# Patient Record
Sex: FEMALE | Race: WHITE | NOT HISPANIC OR LATINO | Employment: OTHER | URBAN - NONMETROPOLITAN AREA
[De-identification: names, ages, dates, MRNs, and addresses within clinical notes are randomized per-mention and may not be internally consistent; named-entity substitution may affect disease eponyms.]

---

## 2023-04-20 PROBLEM — M54.50 CHRONIC LOWER BACK PAIN: Status: ACTIVE | Noted: 2023-04-20

## 2023-04-20 PROBLEM — F51.04 CHRONIC INSOMNIA: Status: ACTIVE | Noted: 2023-04-20

## 2023-04-20 PROBLEM — L70.9 ACNE: Status: ACTIVE | Noted: 2023-04-20

## 2023-04-20 PROBLEM — G43.909 MIGRAINE, UNSPECIFIED, NOT INTRACTABLE, WITHOUT STATUS MIGRAINOSUS: Status: ACTIVE | Noted: 2023-04-20

## 2023-04-20 PROBLEM — H57.12 LEFT EYE PAIN: Status: ACTIVE | Noted: 2023-04-20

## 2023-04-20 PROBLEM — R10.9 ABDOMINAL PAIN: Status: ACTIVE | Noted: 2023-04-20

## 2023-04-20 PROBLEM — E66.01 MORBID OBESITY (MULTI): Status: ACTIVE | Noted: 2023-04-20

## 2023-04-20 PROBLEM — R51.9 CHRONIC DAILY HEADACHE: Status: ACTIVE | Noted: 2023-04-20

## 2023-04-20 PROBLEM — M47.817 FACET DEGENERATION OF LUMBOSACRAL REGION: Status: ACTIVE | Noted: 2023-04-20

## 2023-04-20 PROBLEM — H10.32 ACUTE CONJUNCTIVITIS OF LEFT EYE: Status: ACTIVE | Noted: 2023-04-20

## 2023-04-20 PROBLEM — M51.27 OTHER INTERVERTEBRAL DISC DISPLACEMENT, LUMBOSACRAL REGION: Status: ACTIVE | Noted: 2023-04-20

## 2023-04-20 PROBLEM — E03.9 HYPOTHYROIDISM: Status: ACTIVE | Noted: 2023-04-20

## 2023-04-20 PROBLEM — F41.9 ANXIETY: Status: ACTIVE | Noted: 2023-04-20

## 2023-04-20 PROBLEM — H66.90 ACUTE OTITIS MEDIA: Status: ACTIVE | Noted: 2023-04-20

## 2023-04-20 PROBLEM — M16.11 PRIMARY LOCALIZED OSTEOARTHRITIS OF RIGHT HIP: Status: ACTIVE | Noted: 2023-04-20

## 2023-04-20 PROBLEM — J30.9 ALLERGIC RHINITIS: Status: ACTIVE | Noted: 2023-04-20

## 2023-04-20 PROBLEM — F41.1 GENERALIZED ANXIETY DISORDER WITH PANIC ATTACKS: Status: ACTIVE | Noted: 2023-04-20

## 2023-04-20 PROBLEM — L30.9 DERMATITIS: Status: ACTIVE | Noted: 2023-04-20

## 2023-04-20 PROBLEM — K21.9 GERD (GASTROESOPHAGEAL REFLUX DISEASE): Status: ACTIVE | Noted: 2023-04-20

## 2023-04-20 PROBLEM — M47.816 SPONDYLOSIS WITHOUT MYELOPATHY OR RADICULOPATHY, LUMBAR REGION: Status: ACTIVE | Noted: 2023-04-20

## 2023-04-20 PROBLEM — I10 BENIGN ESSENTIAL HYPERTENSION: Status: ACTIVE | Noted: 2023-04-20

## 2023-04-20 PROBLEM — N93.9 ABNORMAL UTERINE BLEEDING (AUB): Status: ACTIVE | Noted: 2023-04-20

## 2023-04-20 PROBLEM — H69.91 DYSFUNCTION OF RIGHT EUSTACHIAN TUBE: Status: ACTIVE | Noted: 2023-04-20

## 2023-04-20 PROBLEM — E78.00 HYPERCHOLESTEROLEMIA: Status: ACTIVE | Noted: 2023-04-20

## 2023-04-20 PROBLEM — M51.26 HERNIATED LUMBAR INTERVERTEBRAL DISC: Status: ACTIVE | Noted: 2023-04-20

## 2023-04-20 PROBLEM — R00.0 TACHYCARDIA: Status: ACTIVE | Noted: 2023-04-20

## 2023-04-20 PROBLEM — E28.2 POLYCYSTIC OVARY SYNDROME: Status: ACTIVE | Noted: 2023-04-20

## 2023-04-20 PROBLEM — F41.0 GENERALIZED ANXIETY DISORDER WITH PANIC ATTACKS: Status: ACTIVE | Noted: 2023-04-20

## 2023-04-20 PROBLEM — M47.812 CERVICAL SPONDYLOSIS WITHOUT MYELOPATHY: Status: ACTIVE | Noted: 2023-04-20

## 2023-04-20 PROBLEM — E55.9 VITAMIN D DEFICIENCY: Status: ACTIVE | Noted: 2023-04-20

## 2023-04-20 PROBLEM — G43.719 INTRACTABLE CHRONIC MIGRAINE WITHOUT AURA AND WITHOUT STATUS MIGRAINOSUS: Status: ACTIVE | Noted: 2023-04-20

## 2023-04-20 PROBLEM — R53.83 FATIGUE: Status: ACTIVE | Noted: 2023-04-20

## 2023-04-20 PROBLEM — H92.09 CHRONIC EAR PAIN: Status: ACTIVE | Noted: 2023-04-20

## 2023-04-20 PROBLEM — M54.16 RIGHT LUMBAR RADICULOPATHY: Status: ACTIVE | Noted: 2023-04-20

## 2023-04-20 PROBLEM — M54.9 BACK PAIN: Status: ACTIVE | Noted: 2023-04-20

## 2023-04-20 PROBLEM — G43.109 OCULAR MIGRAINE: Status: ACTIVE | Noted: 2023-04-20

## 2023-04-20 PROBLEM — S46.012A TRAUMATIC TEAR OF LEFT ROTATOR CUFF: Status: ACTIVE | Noted: 2023-04-20

## 2023-04-20 PROBLEM — L03.319 CELLULITIS OF TRUNK: Status: ACTIVE | Noted: 2023-04-20

## 2023-04-20 PROBLEM — G89.29 CHRONIC EAR PAIN: Status: ACTIVE | Noted: 2023-04-20

## 2023-04-20 PROBLEM — D72.829 LEUCOCYTOSIS: Status: ACTIVE | Noted: 2023-04-20

## 2023-04-20 PROBLEM — G89.29 CHRONIC LOWER BACK PAIN: Status: ACTIVE | Noted: 2023-04-20

## 2023-04-20 PROBLEM — N92.0 MENORRHAGIA: Status: ACTIVE | Noted: 2023-04-20

## 2023-04-20 RX ORDER — LEVOTHYROXINE SODIUM 100 UG/1
1 TABLET ORAL DAILY
COMMUNITY
Start: 2019-03-29 | End: 2023-04-24 | Stop reason: SDUPTHER

## 2023-04-20 RX ORDER — EPINEPHRINE 0.3 MG/.3ML
0.3 INJECTION SUBCUTANEOUS
COMMUNITY
Start: 2014-03-04 | End: 2023-07-11

## 2023-04-20 RX ORDER — TOPIRAMATE 100 MG/1
CAPSULE, EXTENDED RELEASE ORAL
COMMUNITY
Start: 2018-04-30 | End: 2023-04-24 | Stop reason: SDUPTHER

## 2023-04-20 RX ORDER — ONDANSETRON 4 MG/1
TABLET, FILM COATED ORAL
COMMUNITY
Start: 2017-11-30 | End: 2023-07-11

## 2023-04-20 RX ORDER — LANSOPRAZOLE 30 MG/1
CAPSULE, DELAYED RELEASE ORAL
COMMUNITY
Start: 2018-04-01 | End: 2023-04-24 | Stop reason: SDUPTHER

## 2023-04-20 RX ORDER — SPIRONOLACTONE 100 MG/1
4 TABLET, FILM COATED ORAL DAILY
COMMUNITY
Start: 2016-12-12 | End: 2024-02-06 | Stop reason: ALTCHOICE

## 2023-04-20 RX ORDER — TEMAZEPAM 30 MG/1
CAPSULE ORAL
COMMUNITY
Start: 2019-03-29 | End: 2023-07-11

## 2023-04-20 RX ORDER — CELECOXIB 200 MG/1
1 CAPSULE ORAL 2 TIMES DAILY
COMMUNITY
Start: 2019-11-08 | End: 2023-04-24

## 2023-04-20 RX ORDER — LEVONORGESTREL / ETHINYL ESTRADIOL AND ETHINYL ESTRADIOL 150-30(84)
KIT ORAL
COMMUNITY
Start: 2017-10-20 | End: 2023-04-24 | Stop reason: SDUPTHER

## 2023-04-20 RX ORDER — CYCLOBENZAPRINE HCL 10 MG
1 TABLET ORAL 2 TIMES DAILY PRN
COMMUNITY
Start: 2017-02-02 | End: 2023-07-11

## 2023-04-20 RX ORDER — ALPRAZOLAM 1 MG/1
1 TABLET ORAL 3 TIMES DAILY PRN
COMMUNITY
Start: 2014-03-31 | End: 2024-01-12 | Stop reason: SDUPTHER

## 2023-04-20 RX ORDER — NEBIVOLOL 10 MG/1
TABLET ORAL
COMMUNITY
Start: 2014-06-03 | End: 2023-05-11 | Stop reason: SDUPTHER

## 2023-04-20 RX ORDER — ERGOCALCIFEROL 1.25 MG/1
1 CAPSULE ORAL
COMMUNITY
Start: 2016-12-12 | End: 2023-04-24

## 2023-04-20 RX ORDER — NALOXONE HYDROCHLORIDE 4 MG/.1ML
SPRAY NASAL
COMMUNITY
Start: 2019-05-17 | End: 2023-07-11

## 2023-04-20 RX ORDER — TOBRAMYCIN AND DEXAMETHASONE 3; 1 MG/ML; MG/ML
SUSPENSION/ DROPS OPHTHALMIC
COMMUNITY
Start: 2021-06-06 | End: 2023-07-11

## 2023-04-20 RX ORDER — NEBIVOLOL 20 MG/1
20 TABLET ORAL 2 TIMES DAILY
COMMUNITY
Start: 2014-08-13 | End: 2023-05-10 | Stop reason: SDUPTHER

## 2023-04-24 ENCOUNTER — OFFICE VISIT (OUTPATIENT)
Dept: PRIMARY CARE | Facility: CLINIC | Age: 53
End: 2023-04-24
Payer: COMMERCIAL

## 2023-04-24 ENCOUNTER — APPOINTMENT (OUTPATIENT)
Dept: LAB | Facility: LAB | Age: 53
End: 2023-04-24
Payer: COMMERCIAL

## 2023-04-24 VITALS — SYSTOLIC BLOOD PRESSURE: 120 MMHG | OXYGEN SATURATION: 100 % | DIASTOLIC BLOOD PRESSURE: 70 MMHG | HEART RATE: 107 BPM

## 2023-04-24 DIAGNOSIS — F41.9 ANXIETY: ICD-10-CM

## 2023-04-24 DIAGNOSIS — E11.69 TYPE 2 DIABETES MELLITUS WITH OTHER SPECIFIED COMPLICATION, WITHOUT LONG-TERM CURRENT USE OF INSULIN (MULTI): ICD-10-CM

## 2023-04-24 DIAGNOSIS — L03.115 CELLULITIS OF RIGHT LOWER EXTREMITY: ICD-10-CM

## 2023-04-24 DIAGNOSIS — R53.1 WEAK: Primary | ICD-10-CM

## 2023-04-24 DIAGNOSIS — G43.809 OTHER MIGRAINE WITHOUT STATUS MIGRAINOSUS, NOT INTRACTABLE: ICD-10-CM

## 2023-04-24 DIAGNOSIS — K21.9 GASTROESOPHAGEAL REFLUX DISEASE WITHOUT ESOPHAGITIS: ICD-10-CM

## 2023-04-24 DIAGNOSIS — Z30.9 ENCOUNTER FOR CONTRACEPTIVE MANAGEMENT, UNSPECIFIED TYPE: ICD-10-CM

## 2023-04-24 DIAGNOSIS — E03.9 HYPOTHYROIDISM, UNSPECIFIED TYPE: ICD-10-CM

## 2023-04-24 DIAGNOSIS — R11.2 NAUSEA AND VOMITING, UNSPECIFIED VOMITING TYPE: ICD-10-CM

## 2023-04-24 LAB
ALANINE AMINOTRANSFERASE (SGPT) (U/L) IN SER/PLAS: 10 U/L (ref 7–45)
ALBUMIN (G/DL) IN SER/PLAS: 4 G/DL (ref 3.4–5)
ALKALINE PHOSPHATASE (U/L) IN SER/PLAS: 83 U/L (ref 33–110)
AMYLASE (U/L) IN SER/PLAS: 35 U/L (ref 29–103)
ANION GAP IN SER/PLAS: 18 MMOL/L (ref 10–20)
ASPARTATE AMINOTRANSFERASE (SGOT) (U/L) IN SER/PLAS: 14 U/L (ref 9–39)
BILIRUBIN TOTAL (MG/DL) IN SER/PLAS: 0.4 MG/DL (ref 0–1.2)
CALCIUM (MG/DL) IN SER/PLAS: 9.2 MG/DL (ref 8.6–10.3)
CARBON DIOXIDE, TOTAL (MMOL/L) IN SER/PLAS: 19 MMOL/L (ref 21–32)
CHLORIDE (MMOL/L) IN SER/PLAS: 94 MMOL/L (ref 98–107)
CREATININE (MG/DL) IN SER/PLAS: 1.08 MG/DL (ref 0.5–1.05)
GFR FEMALE: 62 ML/MIN/1.73M2
GLUCOSE (MG/DL) IN SER/PLAS: 622 MG/DL (ref 74–99)
LIPASE (U/L) IN SER/PLAS: 74 U/L (ref 9–82)
POTASSIUM (MMOL/L) IN SER/PLAS: 4.6 MMOL/L (ref 3.5–5.3)
PROTEIN TOTAL: 7.8 G/DL (ref 6.4–8.2)
SODIUM (MMOL/L) IN SER/PLAS: 126 MMOL/L (ref 136–145)
UREA NITROGEN (MG/DL) IN SER/PLAS: 15 MG/DL (ref 6–23)

## 2023-04-24 PROCEDURE — 80053 COMPREHEN METABOLIC PANEL: CPT

## 2023-04-24 PROCEDURE — 3078F DIAST BP <80 MM HG: CPT | Performed by: INTERNAL MEDICINE

## 2023-04-24 PROCEDURE — 99214 OFFICE O/P EST MOD 30 MIN: CPT | Performed by: INTERNAL MEDICINE

## 2023-04-24 PROCEDURE — 36415 COLL VENOUS BLD VENIPUNCTURE: CPT

## 2023-04-24 PROCEDURE — 1036F TOBACCO NON-USER: CPT | Performed by: INTERNAL MEDICINE

## 2023-04-24 PROCEDURE — 83690 ASSAY OF LIPASE: CPT

## 2023-04-24 PROCEDURE — 82150 ASSAY OF AMYLASE: CPT

## 2023-04-24 PROCEDURE — 3074F SYST BP LT 130 MM HG: CPT | Performed by: INTERNAL MEDICINE

## 2023-04-24 RX ORDER — LEVOTHYROXINE SODIUM 100 UG/1
100 TABLET ORAL DAILY
Qty: 30 TABLET | Refills: 0 | Status: SHIPPED | OUTPATIENT
Start: 2023-04-24 | End: 2023-04-24

## 2023-04-24 RX ORDER — TOPIRAMATE 50 MG/1
50 TABLET, FILM COATED ORAL 2 TIMES DAILY
COMMUNITY
End: 2023-04-24 | Stop reason: SDUPTHER

## 2023-04-24 RX ORDER — TOPIRAMATE 50 MG/1
TABLET, FILM COATED ORAL
Qty: 90 TABLET | Refills: 0 | Status: SHIPPED | OUTPATIENT
Start: 2023-04-24 | End: 2024-01-19 | Stop reason: WASHOUT

## 2023-04-24 RX ORDER — LANSOPRAZOLE 30 MG/1
30 CAPSULE, DELAYED RELEASE ORAL 2 TIMES DAILY
Qty: 180 CAPSULE | Refills: 0 | Status: SHIPPED | OUTPATIENT
Start: 2023-04-24 | End: 2023-08-08 | Stop reason: SDUPTHER

## 2023-04-24 RX ORDER — TOPIRAMATE 100 MG/1
100 CAPSULE, EXTENDED RELEASE ORAL 3 TIMES DAILY
Qty: 270 CAPSULE | Refills: 0 | Status: SHIPPED | OUTPATIENT
Start: 2023-04-24 | End: 2023-07-18

## 2023-04-24 RX ORDER — LEVOTHYROXINE SODIUM 100 UG/1
100 TABLET ORAL
Qty: 30 TABLET | Refills: 0 | Status: SHIPPED | OUTPATIENT
Start: 2023-04-24 | End: 2023-07-03

## 2023-04-24 RX ORDER — LEVONORGESTREL / ETHINYL ESTRADIOL AND ETHINYL ESTRADIOL 150-30(84)
1 KIT ORAL DAILY
Qty: 90 TABLET | Refills: 0 | Status: SHIPPED | OUTPATIENT
Start: 2023-04-24 | End: 2023-08-08 | Stop reason: SDUPTHER

## 2023-04-24 NOTE — PROGRESS NOTES
Subjective   Patient ID: Azucena Hagan is a 52 y.o. female who presents for Follow-up (During Easter patient was vomiting, 10 times, vomtiing mucus did not have food in system, patient was too weak to get up, sweating, muscle cramps too tired to do anything)  .  HPI  Patient here for sick visit.    Spouse (Van) present for H&P    Patient states that she had about of vomiting and weakness with muscle cramps occurred 2 weeks ago.  Since then she continues to be tired needing help to ambulate and continues to have the muscle cramps.  Refused to go to ER when sx started. Dangers of dehydration rev'd.    DM not on therapy due to weight loss.  Blood sugars not being checked.  Dangers including DKA, death reviewed.    Hypothyroid patient has noticed significant hair loss even prior to illness has not been taking her medication for at least a month.  Unclear if ran out or why this was the case.    Chronic heel infection healing  slowly podiatry following.    BRENDAN on therapy per psychiatry no side effects.    Oral contraceptive therapy/PCOS.  Patient continues on therapy will need to see GYN for follow-up once improved.    GERD stable  on rx no side effects    Migraines stable on rx no side effects    Diet discussed    Review of Systems   All other systems reviewed and are negative.      Objective   Physical Exam  Vitals reviewed.   Constitutional:       Appearance: Normal appearance. She is normal weight.      Comments: Weak  Need assistance to walk unsteady   HENT:      Head: Normocephalic and atraumatic.      Mouth/Throat:      Pharynx: No posterior oropharyngeal erythema.   Eyes:      General: No scleral icterus.     Conjunctiva/sclera: Conjunctivae normal.      Pupils: Pupils are equal, round, and reactive to light.   Cardiovascular:      Rate and Rhythm: Normal rate and regular rhythm.      Heart sounds: Normal heart sounds.   Pulmonary:      Effort: Pulmonary effort is normal. No respiratory distress.      Breath  sounds: No wheezing.   Abdominal:      General: Abdomen is flat. Bowel sounds are normal. There is no distension.      Palpations: Abdomen is soft. There is no mass.      Tenderness: There is no abdominal tenderness. There is no rebound.   Musculoskeletal:         General: Normal range of motion.      Cervical back: Normal range of motion and neck supple.   Skin:     General: Skin is warm and dry.   Neurological:      General: No focal deficit present.      Mental Status: She is alert and oriented to person, place, and time. Mental status is at baseline.   Psychiatric:         Mood and Affect: Mood normal.         Behavior: Behavior normal.         Thought Content: Thought content normal.         Judgment: Judgment normal.         Assessment/Plan   Problem List Items Addressed This Visit          Digestive    GERD (gastroesophageal reflux disease)    Relevant Medications    lansoprazole (Prevacid) 30 mg DR capsule       Endocrine/Metabolic    Hypothyroidism    Relevant Medications    Synthroid 100 mcg tablet       Other    Anxiety    Migraine, unspecified, not intractable, without status migrainosus    Relevant Medications    topiramate 100 mg capsule,extended release 24hr    topiramate (Topamax) 50 mg tablet     Other Visit Diagnoses       Weak    -  Primary    Relevant Orders    Comprehensive Metabolic Panel (Completed)    Nausea and vomiting, unspecified vomiting type        Relevant Orders    Comprehensive Metabolic Panel (Completed)    Lipase (Completed)    Amylase (Completed)    Type 2 diabetes mellitus with other specified complication, without long-term current use of insulin (CMS/[facility])        Cellulitis of right lower extremity        Encounter for contraceptive management, unspecified type        Relevant Medications    L norgest/e.estradioL-e.estrad (Seasonique) 0.15 mg-30 mcg (84)/10 mcg (7) tablets,dose pack,3 month tablet            Patient states that she had about of vomiting and weakness with muscle  cramps occurred 2 weeks ago.  Since then she continues to be tired needing help to ambulate and continues to have the muscle cramps.  Refused to go to ER when sx started. Dangers of dehydration rev'd.  Check labs now    DM not on therapy due to weight loss.  Blood sugars not being checked.  Dangers including DKA, death reviewed.    Hypothyroid patient has noticed significant hair loss even prior to illness has not been taking her medication for at least a month.  Unclear if ran out or why this was the case. Will resume Synthroid 100 mcg daily and recheck in 3 weeks    Chronic heel infection healing  slowly podiatry following.    BRENDAN on therapy per psychiatry no side effects.    Oral contraceptive therapy/PCOS.  Patient continues on therapy will need to see GYN for follow-up once improved.    GERD stable  on rx no side effects    Migraines stable on rx no side effects    Diet discussed    Follow up pending

## 2023-05-03 DIAGNOSIS — R53.1 WEAKNESS: ICD-10-CM

## 2023-05-03 DIAGNOSIS — R53.83 OTHER FATIGUE: Primary | ICD-10-CM

## 2023-05-10 DIAGNOSIS — U09.9 POST-COVID-19 SYNDROME MANIFESTING AS CHRONIC FATIGUE: ICD-10-CM

## 2023-05-10 DIAGNOSIS — G93.32 POST-COVID-19 SYNDROME MANIFESTING AS CHRONIC FATIGUE: ICD-10-CM

## 2023-05-10 DIAGNOSIS — I10 BENIGN ESSENTIAL HYPERTENSION: Primary | ICD-10-CM

## 2023-05-11 DIAGNOSIS — U09.9 POST-COVID CHRONIC FATIGUE: ICD-10-CM

## 2023-05-11 DIAGNOSIS — G93.32 POST-COVID CHRONIC FATIGUE: ICD-10-CM

## 2023-05-11 DIAGNOSIS — R00.0 TACHYCARDIA: Primary | ICD-10-CM

## 2023-05-11 RX ORDER — NEBIVOLOL 20 MG/1
20 TABLET ORAL 2 TIMES DAILY
Qty: 60 TABLET | Refills: 2 | Status: SHIPPED | OUTPATIENT
Start: 2023-05-11 | End: 2023-07-11 | Stop reason: WASHOUT

## 2023-05-11 RX ORDER — NEBIVOLOL 20 MG/1
20 TABLET ORAL 2 TIMES DAILY
Qty: 60 TABLET | Refills: 2 | Status: SHIPPED | OUTPATIENT
Start: 2023-05-11 | End: 2023-10-24 | Stop reason: WASHOUT

## 2023-06-19 ENCOUNTER — APPOINTMENT (OUTPATIENT)
Dept: PRIMARY CARE | Facility: CLINIC | Age: 53
End: 2023-06-19
Payer: COMMERCIAL

## 2023-06-19 ENCOUNTER — TELEMEDICINE (OUTPATIENT)
Dept: PRIMARY CARE | Facility: CLINIC | Age: 53
End: 2023-06-19
Payer: COMMERCIAL

## 2023-06-19 DIAGNOSIS — N39.0 URINARY TRACT INFECTION WITHOUT HEMATURIA, SITE UNSPECIFIED: ICD-10-CM

## 2023-06-19 DIAGNOSIS — F41.9 ANXIETY: ICD-10-CM

## 2023-06-19 DIAGNOSIS — E03.9 HYPOTHYROIDISM, UNSPECIFIED TYPE: ICD-10-CM

## 2023-06-19 DIAGNOSIS — I10 HYPERTENSION, UNSPECIFIED TYPE: ICD-10-CM

## 2023-06-19 DIAGNOSIS — E11.10 DIABETIC KETOACIDOSIS WITHOUT COMA ASSOCIATED WITH TYPE 2 DIABETES MELLITUS (MULTI): Primary | ICD-10-CM

## 2023-06-19 PROCEDURE — 99215 OFFICE O/P EST HI 40 MIN: CPT | Performed by: INTERNAL MEDICINE

## 2023-06-20 NOTE — PROGRESS NOTES
Subjective   Patient ID: Azucena Hagan is a 52 y.o. female who presents for follow  up ER visit      HPI    Telehealth visit    Patient was in the ER few days ago complaining of generalized weakness fatigue unable to walk about her house without assistance including a walker.  She also noticed that she was having nausea vomiting and dizziness.  She was seen in the [facility] ER where she was unhappy with her care.  She was seen by Endo who recommended she continue IV hydration as well as ceftriaxone.  For DKA as well as UTI.  She left AMA.      Today she states she is still nauseous unable to take any p.o. intake.  She also states she has no insulin or antibiotic prescriptions since she left AMA.    She was seen laying in bed appearing to be weak with significantly dry lips.  She was alert and oriented and otherwise unremarkable.    Spouse / Van present for visit      Review of Systems   All other systems reviewed and are negative.      Objective   There were no vitals taken for this visit.  Lab Results   Component Value Date    WBC 8.0 06/19/2023    HGB 13.1 06/19/2023    HCT 37.4 06/19/2023     06/19/2023    ALT 9 06/19/2023    AST 8 (L) 06/19/2023     (L) 06/19/2023    K 3.3 (L) 06/19/2023    CL 97 (L) 06/19/2023    CREATININE 0.77 06/19/2023    BUN 9 06/19/2023    CO2 22 06/19/2023    TSH 4.07 (H) 06/17/2023    INR 1.1 06/17/2023    HGBA1C 13.2 03/13/2019           Physical Exam  Constitutional:       Appearance: She is ill-appearing.      Comments: Laying in bed   HENT:      Mouth/Throat:      Mouth: Mucous membranes are dry.   Pulmonary:      Effort: Pulmonary effort is normal.   Neurological:      Mental Status: She is alert.   Psychiatric:         Mood and Affect: Mood normal.         Problem List Items Addressed This Visit          Endocrine/Metabolic    Hypothyroidism       Other    Anxiety     Other Visit Diagnoses       Diabetic ketoacidosis without coma associated with type 2 diabetes  mellitus (CMS/HCC)    -  Primary    Urinary tract infection without hematuria, site unspecified        Hypertension, unspecified type              Assessment/Plan     Patient was in the ER few days ago complaining of generalized weakness fatigue unable to walk about her house without assistance including a walker.  She also noticed that she was having nausea vomiting and dizziness.  She was seen in the [facility] ER where she was unhappy with her care.  She was seen by Lluvia who recommended she continue IV hydration as well as ceftriaxone.  For DKA as well as UTI.  She left AMA.      Today she states she is still nauseous unable to take any p.o. intake.  She also states she has no insulin or antibiotic prescriptions since she left AMA.    She was seen laying in bed appearing to be weak with significantly dry lips.  She was alert and oriented and otherwise unremarkable.    Recommended patient go back to [facility] ER  D/W ER MD    UTI    Hypertension    Hypothyroidism    BRENDAN    Follow up pending

## 2023-06-22 ENCOUNTER — PATIENT OUTREACH (OUTPATIENT)
Dept: CARE COORDINATION | Facility: CLINIC | Age: 53
End: 2023-06-22
Payer: COMMERCIAL

## 2023-06-22 ENCOUNTER — TELEPHONE (OUTPATIENT)
Dept: PRIMARY CARE | Facility: CLINIC | Age: 53
End: 2023-06-22
Payer: COMMERCIAL

## 2023-06-22 NOTE — PROGRESS NOTES
Call placed and message left for Azucena to return the CM's call.  Azucena would benefit from our Milestones program as her HgbA1c is >13.  She also would benefit from the  VBID program for $0 co-pays for her diabetic meds and supplies following the Pharm D med review

## 2023-06-22 NOTE — TELEPHONE ENCOUNTER
Transition of Care    [facility]  Discharge diagnosis: DM  Discharged to: Home  Discharge date: 6/21/23  Initial Call date: 6/22/23  Spoke with patient/caregiver: Caregiver                                                                     Do you need assistance  visits prior to your PCP visit: No  Home health care ordered: No  Have you been contacted by home care and have a start of care date: No  Are you taking medications as prescribed at discharge: Yes    Referral to [facility] Pharmacist: No  Patient advised to bring all medications to PCP follow-up appointment.  Patient advised to follow discharge instructions until provider follow-up.  TCM visit date: 6/27/23  TCM provider visit with: Renee Ty MD

## 2023-06-23 ENCOUNTER — TELEMEDICINE (OUTPATIENT)
Dept: PHARMACY | Facility: HOSPITAL | Age: 53
End: 2023-06-23
Payer: COMMERCIAL

## 2023-06-23 DIAGNOSIS — Z79.4 TYPE 2 DIABETES MELLITUS WITHOUT COMPLICATION, WITH LONG-TERM CURRENT USE OF INSULIN (MULTI): Primary | ICD-10-CM

## 2023-06-23 DIAGNOSIS — E11.9 TYPE 2 DIABETES MELLITUS WITHOUT COMPLICATION, WITH LONG-TERM CURRENT USE OF INSULIN (MULTI): ICD-10-CM

## 2023-06-23 DIAGNOSIS — E11.9 TYPE 2 DIABETES MELLITUS WITHOUT COMPLICATION, WITH LONG-TERM CURRENT USE OF INSULIN (MULTI): Primary | ICD-10-CM

## 2023-06-23 DIAGNOSIS — Z79.4 TYPE 2 DIABETES MELLITUS WITHOUT COMPLICATION, WITH LONG-TERM CURRENT USE OF INSULIN (MULTI): ICD-10-CM

## 2023-06-23 RX ORDER — BLOOD-GLUCOSE SENSOR
EACH MISCELLANEOUS
Qty: 2 EACH | Refills: 11 | Status: SHIPPED | OUTPATIENT
Start: 2023-06-23 | End: 2023-06-23

## 2023-06-23 RX ORDER — INSULIN ASPART 100 [IU]/ML
2 INJECTION, SOLUTION INTRAVENOUS; SUBCUTANEOUS
COMMUNITY
Start: 2023-06-21 | End: 2023-07-11

## 2023-06-23 RX ORDER — INSULIN GLARGINE-YFGN 100 [IU]/ML
28 INJECTION, SOLUTION SUBCUTANEOUS NIGHTLY
COMMUNITY
Start: 2023-06-21 | End: 2023-10-24 | Stop reason: WASHOUT

## 2023-06-23 ASSESSMENT — ENCOUNTER SYMPTOMS: DIABETIC ASSOCIATED SYMPTOMS: 0

## 2023-06-23 NOTE — Clinical Note
Patient enrolled in employee diabetes program for $0 co-pays. Pt to establish with Dr. Diaz next month.

## 2023-06-23 NOTE — PROGRESS NOTES
"Pharmacy Clinic Note    Azucena Hagan is a 52 y.o. female was referred to Clinical Pharmacy Team for enrollment into the [facility] Reduced Copay Prgram for Diabetes (VBID).    Referring Provider: Renee Ty MD    Subjective   Allergies   Allergen Reactions    Nut - Unspecified Anaphylaxis    Bee Venom Protein (Honey Bee) Unknown    House Dust Unknown    Hydrocodone-Acetaminophen Hallucinations    Insulin Regular Unknown    Red Dye Unknown       [facility] [address]  [address] [address]  Phone: [phone] Fax: [phone]      Diabetes  She presents for her initial diabetic visit. She has type 2 diabetes mellitus. There are no hypoglycemic associated symptoms. There are no diabetic associated symptoms. There are no hypoglycemic complications. Risk factors for coronary artery disease include diabetes mellitus. Current diabetic treatment includes intensive insulin program. She is compliant with treatment all of the time.       Objective     There were no vitals taken for this visit.     LAB  Lab Results   Component Value Date    BILITOT 0.9 06/19/2023    CALCIUM 6.9 (L) 06/21/2023    CO2 21 06/21/2023     06/21/2023    CREATININE 0.60 06/21/2023    GLUCOSE 183 (H) 06/21/2023    ALKPHOS 56 06/19/2023    K 2.9 (LL) 06/21/2023    PROT 6.4 06/19/2023     06/21/2023    AST 8 (L) 06/19/2023    ALT 9 06/19/2023    BUN 2 (L) 06/21/2023    ANIONGAP 13 06/21/2023    MG 1.85 06/21/2023    PHOS 1.3 (L) 06/17/2023    PHOS CANCELED 06/17/2023    ALBUMIN 3.5 06/19/2023    AMYLASE 40 06/17/2023    LIPASE 39 06/19/2023    GFRF >90 06/21/2023    GFRMALE CANCELED 06/19/2023     No results found for: \"TRIG\", \"CHOL\", \"LDLCALC\", \"HDL\"  Lab Results   Component Value Date    HGBA1C 13.6 (A) 06/21/2023       Current Outpatient Medications on File Prior to Visit   Medication Sig Dispense Refill    ALPRAZolam (Xanax) 1 mg tablet Take 1 tablet (1 mg) by " mouth.      cyclobenzaprine (Flexeril) 10 mg tablet Take 1 tablet (10 mg) by mouth 2 times a day as needed.      EPINEPHrine 0.3 mg/0.3 mL injection syringe 0.3 mL (0.3 mg). As Directed      L norgest/e.estradioL-e.estrad (Seasonique) 0.15 mg-30 mcg (84)/10 mcg (7) tablets,dose pack,3 month tablet Take 1 tablet by mouth once daily. 90 tablet 0    lansoprazole (Prevacid) 30 mg DR capsule Take 1 capsule (30 mg) by mouth in the morning and 1 capsule (30 mg) before bedtime. 180 capsule 0    naloxone (Narcan) 4 mg/0.1 mL nasal spray Administer into affected nostril(s).      nebivolol (Bystolic) 20 mg tablet Take 1 tablet (20 mg) by mouth 2 times a day. GUIDO 60 tablet 2    nebivolol (Bystolic) 20 mg tablet Take 1 tablet (20 mg) by mouth 2 times a day. 60 tablet 2    ondansetron (Zofran) 4 mg tablet Take by mouth.      spironolactone (Aldactone) 100 mg tablet Take by mouth.      Synthroid 100 mcg tablet Take 1 tablet (100 mcg) by mouth once daily in the morning. Take before meals. 30 tablet 0    temazepam (Restoril) 30 mg capsule Take by mouth.      tobramycin-dexamethasone (Tobradex) ophthalmic suspension Administer into affected eye(s).      topiramate (Topamax) 50 mg tablet 1 a day with 300mg 90 tablet 0    topiramate 100 mg capsule,extended release 24hr Take 100 mg by mouth in the morning and 100 mg in the evening and 100 mg before bedtime. 270 capsule 0     No current facility-administered medications on file prior to visit.        DRUG INTERACTIONS  - No significant drug-drug interactions exist that require change in therapy  _______________________________________________________________________  PATIENT EDUCATION/GOALS    1. Discussed disease specific goals:   - Fasting B - 130 mg/dL  - Postprandial BG: less than 180 mg/dL  - A1c: less than 7%    2. Patient enrolled in  VBID program to assist with diabetic medication/supply co-pays. Enrollment takes 1-2 weeks and afterwards covered diabetes  medications/supplies should have no cost.   -_ Prescription for Freestyle Alvarado 3 sent to [facility] pharmacy to allow pt to set-up before appointment with Dr. Diaz  _______________________________________________________________________    Assessment/Plan   Problem List Items Addressed This Visit    None  Visit Diagnoses       Type 2 diabetes mellitus without complication, with long-term current use of insulin (CMS/[facility])                 Continue all meds under the continuation of care with the referring provider and clinical pharmacy team.    Clinical Pharmacist follow-up: 10-11 months    Felton Zhang, PharmD     Verbal consent to manage patient's drug therapy was obtained from [the patient and/or an individual authorized to act on behalf of a patient]. They were informed they may decline to participate or withdraw from participation in pharmacy services at any time.

## 2023-06-27 ENCOUNTER — APPOINTMENT (OUTPATIENT)
Dept: PRIMARY CARE | Facility: CLINIC | Age: 53
End: 2023-06-27
Payer: COMMERCIAL

## 2023-06-28 ENCOUNTER — APPOINTMENT (OUTPATIENT)
Dept: PRIMARY CARE | Facility: CLINIC | Age: 53
End: 2023-06-28
Payer: COMMERCIAL

## 2023-07-02 DIAGNOSIS — E03.9 HYPOTHYROIDISM, UNSPECIFIED TYPE: ICD-10-CM

## 2023-07-03 RX ORDER — LEVOTHYROXINE SODIUM 100 UG/1
TABLET ORAL
Qty: 30 TABLET | Refills: 0 | Status: SHIPPED | OUTPATIENT
Start: 2023-07-03 | End: 2023-07-18

## 2023-07-11 ENCOUNTER — TELEMEDICINE (OUTPATIENT)
Dept: PRIMARY CARE | Facility: CLINIC | Age: 53
End: 2023-07-11
Payer: COMMERCIAL

## 2023-07-11 DIAGNOSIS — E11.69 TYPE 2 DIABETES MELLITUS WITH OTHER SPECIFIED COMPLICATION, WITH LONG-TERM CURRENT USE OF INSULIN (MULTI): Primary | ICD-10-CM

## 2023-07-11 DIAGNOSIS — L97.529 DIABETIC ULCER OF TOE OF LEFT FOOT ASSOCIATED WITH TYPE 2 DIABETES MELLITUS, UNSPECIFIED ULCER STAGE (MULTI): ICD-10-CM

## 2023-07-11 DIAGNOSIS — R91.1 LUNG NODULE SEEN ON IMAGING STUDY: ICD-10-CM

## 2023-07-11 DIAGNOSIS — N39.0 URINARY TRACT INFECTION WITHOUT HEMATURIA, SITE UNSPECIFIED: ICD-10-CM

## 2023-07-11 DIAGNOSIS — E87.6 HYPOKALEMIA: ICD-10-CM

## 2023-07-11 DIAGNOSIS — E11.621 DIABETIC ULCER OF TOE OF LEFT FOOT ASSOCIATED WITH TYPE 2 DIABETES MELLITUS, UNSPECIFIED ULCER STAGE (MULTI): ICD-10-CM

## 2023-07-11 DIAGNOSIS — Z79.4 TYPE 2 DIABETES MELLITUS WITH OTHER SPECIFIED COMPLICATION, WITH LONG-TERM CURRENT USE OF INSULIN (MULTI): Primary | ICD-10-CM

## 2023-07-11 PROCEDURE — 99215 OFFICE O/P EST HI 40 MIN: CPT | Performed by: INTERNAL MEDICINE

## 2023-07-11 RX ORDER — BLOOD-GLUCOSE METER
EACH MISCELLANEOUS
COMMUNITY
Start: 2022-11-16 | End: 2023-07-11

## 2023-07-11 RX ORDER — ONDANSETRON 8 MG/1
TABLET, ORALLY DISINTEGRATING ORAL
COMMUNITY
Start: 2023-06-21 | End: 2023-10-24 | Stop reason: WASHOUT

## 2023-07-11 RX ORDER — CLINDAMYCIN PHOSPHATE 11.9 MG/ML
SOLUTION TOPICAL
COMMUNITY
Start: 2023-01-10 | End: 2024-05-24 | Stop reason: HOSPADM

## 2023-07-11 RX ORDER — PEN NEEDLE, DIABETIC 31 GX5/16"
NEEDLE, DISPOSABLE MISCELLANEOUS 4 TIMES DAILY
COMMUNITY
Start: 2023-06-21

## 2023-07-11 RX ORDER — FAMOTIDINE 10 MG/1
TABLET ORAL 2 TIMES DAILY PRN
COMMUNITY

## 2023-07-11 RX ORDER — LANCETS 30 GAUGE
EACH MISCELLANEOUS
COMMUNITY
Start: 2022-11-16 | End: 2023-07-11

## 2023-07-11 RX ORDER — POTASSIUM CHLORIDE 1.5 G/1.58G
POWDER, FOR SOLUTION ORAL
COMMUNITY
Start: 2023-06-21 | End: 2023-11-28 | Stop reason: WASHOUT

## 2023-07-12 ENCOUNTER — LAB (OUTPATIENT)
Dept: LAB | Facility: LAB | Age: 53
End: 2023-07-12
Payer: COMMERCIAL

## 2023-07-12 ENCOUNTER — OFFICE VISIT (OUTPATIENT)
Dept: PRIMARY CARE | Facility: CLINIC | Age: 53
End: 2023-07-12
Payer: COMMERCIAL

## 2023-07-12 DIAGNOSIS — R39.9 UTI SYMPTOMS: Primary | ICD-10-CM

## 2023-07-12 DIAGNOSIS — N39.0 URINARY TRACT INFECTION WITHOUT HEMATURIA, SITE UNSPECIFIED: ICD-10-CM

## 2023-07-12 LAB
POC APPEARANCE, URINE: CLEAR
POC BILIRUBIN, URINE: ABNORMAL
POC BLOOD, URINE: NEGATIVE
POC COLOR, URINE: YELLOW
POC GLUCOSE, URINE: NEGATIVE MG/DL
POC KETONES, URINE: NEGATIVE MG/DL
POC LEUKOCYTES, URINE: ABNORMAL
POC NITRITE,URINE: NEGATIVE
POC PH, URINE: 6.5 PH
POC PROTEIN, URINE: NEGATIVE MG/DL
POC SPECIFIC GRAVITY, URINE: <=1.005
POC UROBILINOGEN, URINE: 0.2 EU/DL

## 2023-07-12 PROCEDURE — 99212 OFFICE O/P EST SF 10 MIN: CPT | Performed by: INTERNAL MEDICINE

## 2023-07-12 PROCEDURE — 87086 URINE CULTURE/COLONY COUNT: CPT

## 2023-07-12 PROCEDURE — 81002 URINALYSIS NONAUTO W/O SCOPE: CPT | Performed by: INTERNAL MEDICINE

## 2023-07-12 PROCEDURE — 1036F TOBACCO NON-USER: CPT | Performed by: INTERNAL MEDICINE

## 2023-07-12 NOTE — PROGRESS NOTES
Subjective   Patient ID: Azucena Hagan is a 52 y.o. female who presents for TCM     HPI    Tele visit   Spouse / Van present H&P    Dx DKA, UTI, abdominal pain, left middle toe diabetic ulcer    DM 2 uncontrolled better on insulin  Random   Endo following    Hypokalemia on supplementation  follow    UTI slow to start urination, no dysuria    Diabetic ulcer better per pt  Podiatry following    Lung nodule  CT chest ordered      Review of Systems   All other systems reviewed and are negative.      Objective   There were no vitals taken for this visit.  Lab Results   Component Value Date    WBC 6.0 06/21/2023    HGB 10.8 (L) 06/21/2023    HCT 31.4 (L) 06/21/2023     06/21/2023    ALT 9 06/19/2023    AST 8 (L) 06/19/2023     06/21/2023    K 2.9 (LL) 06/21/2023     06/21/2023    CREATININE 0.60 06/21/2023    BUN 2 (L) 06/21/2023    CO2 21 06/21/2023    TSH 4.07 (H) 06/17/2023    INR 1.1 06/17/2023    HGBA1C 13.6 (A) 06/21/2023           Physical Exam  Constitutional:       Appearance: Normal appearance.      Comments: Resting in bed, NAD   Pulmonary:      Effort: Pulmonary effort is normal.   Neurological:      Mental Status: She is alert.   Psychiatric:         Mood and Affect: Mood normal.         Problem List Items Addressed This Visit    None  Visit Diagnoses       Type 2 diabetes mellitus with other specified complication, with long-term current use of insulin (CMS/[facility])    -  Primary    Diabetic ulcer of toe of left foot associated with type 2 diabetes mellitus, unspecified ulcer stage (CMS/[facility])        Urinary tract infection without hematuria, site unspecified        Hypokalemia        Lung nodule seen on imaging study              Assessment/Plan   TCM    Dx DKA, UTI, abdominal pain, left middle toe diabetic ulcer    DM 2 uncontrolled better on insulin  Random   Endo following    Hypokalemia on supplementation  Follow BMP    UTI slow to start urination, no dysuria  Check u/a,  culture    Diabetic ulcer better per pt  Podiatry following    Lung nodule  CT chest ordered    Follow up pending / 1 month

## 2023-07-12 NOTE — PROGRESS NOTES
Subjective   Patient ID: Azucena Hagan is a 52 y.o. female who presents to drop of urine specimen  HPI    Review of Systems    Objective   There were no vitals taken for this visit.  Lab Results   Component Value Date    WBC 6.0 06/21/2023    HGB 10.8 (L) 06/21/2023    HCT 31.4 (L) 06/21/2023     06/21/2023    ALT 9 06/19/2023    AST 8 (L) 06/19/2023     06/21/2023    K 2.9 (LL) 06/21/2023     06/21/2023    CREATININE 0.60 06/21/2023    BUN 2 (L) 06/21/2023    CO2 21 06/21/2023    TSH 4.07 (H) 06/17/2023    INR 1.1 06/17/2023    HGBA1C 13.6 (A) 06/21/2023           Physical Exam    Problem List Items Addressed This Visit    None  Visit Diagnoses       UTI symptoms    -  Primary          Assessment/Plan     Check urinalysis +  Check urine culture  Rest increase fluids

## 2023-07-13 LAB — URINE CULTURE: NORMAL

## 2023-07-14 ENCOUNTER — TELEPHONE (OUTPATIENT)
Dept: PRIMARY CARE | Facility: CLINIC | Age: 53
End: 2023-07-14
Payer: COMMERCIAL

## 2023-07-17 DIAGNOSIS — E03.9 HYPOTHYROIDISM, UNSPECIFIED TYPE: ICD-10-CM

## 2023-07-18 DIAGNOSIS — G43.809 OTHER MIGRAINE WITHOUT STATUS MIGRAINOSUS, NOT INTRACTABLE: ICD-10-CM

## 2023-07-18 DIAGNOSIS — E03.9 HYPOTHYROIDISM, UNSPECIFIED TYPE: Primary | ICD-10-CM

## 2023-07-18 RX ORDER — TOPIRAMATE 100 MG/1
CAPSULE, EXTENDED RELEASE ORAL
Qty: 270 CAPSULE | Refills: 0 | Status: SHIPPED | OUTPATIENT
Start: 2023-07-18 | End: 2023-07-18

## 2023-07-18 RX ORDER — LEVOTHYROXINE SODIUM 100 UG/1
TABLET ORAL
Qty: 90 TABLET | Refills: 1 | Status: SHIPPED | OUTPATIENT
Start: 2023-07-18 | End: 2023-10-24 | Stop reason: SDUPTHER

## 2023-07-19 DIAGNOSIS — G43.809 OTHER MIGRAINE WITHOUT STATUS MIGRAINOSUS, NOT INTRACTABLE: ICD-10-CM

## 2023-07-22 RX ORDER — TOPIRAMATE 50 MG/1
TABLET, FILM COATED ORAL
Qty: 90 TABLET | Refills: 0 | OUTPATIENT
Start: 2023-07-22

## 2023-08-08 DIAGNOSIS — K21.9 GASTROESOPHAGEAL REFLUX DISEASE WITHOUT ESOPHAGITIS: ICD-10-CM

## 2023-08-08 DIAGNOSIS — Z30.9 ENCOUNTER FOR CONTRACEPTIVE MANAGEMENT, UNSPECIFIED TYPE: ICD-10-CM

## 2023-08-09 RX ORDER — LANSOPRAZOLE 30 MG/1
30 CAPSULE, DELAYED RELEASE ORAL 2 TIMES DAILY
Qty: 180 CAPSULE | Refills: 0 | Status: SHIPPED | OUTPATIENT
Start: 2023-08-09 | End: 2023-08-09

## 2023-08-09 RX ORDER — LEVONORGESTREL / ETHINYL ESTRADIOL AND ETHINYL ESTRADIOL 150-30(84)
1 KIT ORAL DAILY
Qty: 30 TABLET | Refills: 0 | Status: SHIPPED | OUTPATIENT
Start: 2023-08-09 | End: 2023-08-09

## 2023-08-10 ENCOUNTER — PATIENT OUTREACH (OUTPATIENT)
Dept: CARE COORDINATION | Facility: CLINIC | Age: 53
End: 2023-08-10
Payer: COMMERCIAL

## 2023-08-10 NOTE — PROGRESS NOTES
Again called Azucena to discuss Milestones.  She did have her Pharm D med review and thus will qualify for the VBID program with $0 co-pays on her diabetes supplies and meds.  This CM has requested that Azucena return this call

## 2023-08-25 PROBLEM — N39.0 UTI (URINARY TRACT INFECTION): Status: ACTIVE | Noted: 2023-08-25

## 2023-08-25 PROBLEM — S91.309A WOUND OF FOOT: Status: ACTIVE | Noted: 2023-08-25

## 2023-08-25 PROBLEM — R07.9 CHEST PAIN: Status: ACTIVE | Noted: 2023-08-25

## 2023-08-25 PROBLEM — Z78.9 USES BIRTH CONTROL: Status: ACTIVE | Noted: 2023-08-25

## 2023-08-25 PROBLEM — R42 DIZZINESS: Status: ACTIVE | Noted: 2023-08-25

## 2023-08-25 PROBLEM — I10 PRIMARY HYPERTENSION: Status: ACTIVE | Noted: 2023-08-25

## 2023-08-25 PROBLEM — R63.4 WEIGHT LOSS: Status: ACTIVE | Noted: 2023-08-25

## 2023-08-25 RX ORDER — EPINEPHRINE 0.3 MG/.3ML
0.3 INJECTION INTRAMUSCULAR
COMMUNITY
Start: 2014-03-04 | End: 2023-11-22 | Stop reason: SDUPTHER

## 2023-08-25 RX ORDER — UBIDECARENONE 75 MG
1 CAPSULE ORAL DAILY
COMMUNITY
Start: 2023-07-19 | End: 2023-11-28 | Stop reason: SINTOL

## 2023-08-25 RX ORDER — LANCETS 33 GAUGE
EACH MISCELLANEOUS
COMMUNITY
Start: 2022-11-16

## 2023-08-25 RX ORDER — INSULIN ASPART 100 [IU]/ML
4-10 INJECTION, SOLUTION INTRAVENOUS; SUBCUTANEOUS
COMMUNITY
Start: 2023-07-19 | End: 2023-10-24

## 2023-08-30 ENCOUNTER — APPOINTMENT (OUTPATIENT)
Dept: PRIMARY CARE | Facility: CLINIC | Age: 53
End: 2023-08-30
Payer: COMMERCIAL

## 2023-08-31 PROBLEM — E11.621 DIABETIC ULCER OF LEFT FOOT (MULTI): Status: ACTIVE | Noted: 2023-08-31

## 2023-08-31 PROBLEM — L97.412: Status: ACTIVE | Noted: 2023-08-31

## 2023-08-31 PROBLEM — L97.416: Status: ACTIVE | Noted: 2023-08-31

## 2023-08-31 PROBLEM — E11.621: Status: ACTIVE | Noted: 2023-08-31

## 2023-08-31 PROBLEM — I96 GANGRENE OF TOE OF LEFT FOOT (MULTI): Status: ACTIVE | Noted: 2023-08-31

## 2023-08-31 PROBLEM — E11.65 POORLY CONTROLLED TYPE 2 DIABETES MELLITUS (MULTI): Status: ACTIVE | Noted: 2023-08-31

## 2023-08-31 PROBLEM — Z86.39 HISTORY OF HYPOKALEMIA: Status: ACTIVE | Noted: 2023-08-31

## 2023-08-31 PROBLEM — E11.10 DKA (DIABETIC KETOACIDOSIS) (MULTI): Status: ACTIVE | Noted: 2023-08-31

## 2023-08-31 PROBLEM — E11.65 UNCONTROLLED DIABETES MELLITUS WITH HYPERGLYCEMIA (MULTI): Status: ACTIVE | Noted: 2023-08-31

## 2023-08-31 PROBLEM — E87.6 HYPOKALEMIA: Status: ACTIVE | Noted: 2023-08-31

## 2023-08-31 PROBLEM — E86.0 DEHYDRATION: Status: ACTIVE | Noted: 2023-08-31

## 2023-08-31 PROBLEM — L97.529 DIABETIC ULCER OF LEFT FOOT (MULTI): Status: ACTIVE | Noted: 2023-08-31

## 2023-08-31 RX ORDER — TEMAZEPAM 30 MG/1
30 CAPSULE ORAL NIGHTLY PRN
COMMUNITY
Start: 2023-08-28 | End: 2024-01-12 | Stop reason: SDUPTHER

## 2023-09-06 ENCOUNTER — APPOINTMENT (OUTPATIENT)
Dept: PRIMARY CARE | Facility: CLINIC | Age: 53
End: 2023-09-06
Payer: COMMERCIAL

## 2023-09-11 ENCOUNTER — PATIENT OUTREACH (OUTPATIENT)
Dept: CARE COORDINATION | Facility: CLINIC | Age: 53
End: 2023-09-11
Payer: COMMERCIAL

## 2023-09-23 PROBLEM — E11.9 DIABETES (MULTI): Status: ACTIVE | Noted: 2023-09-23

## 2023-09-23 PROBLEM — Z79.891 LONG TERM (CURRENT) USE OF OPIATE ANALGESIC: Status: ACTIVE | Noted: 2023-09-23

## 2023-09-23 PROBLEM — M54.9 DORSODYNIA: Status: ACTIVE | Noted: 2023-09-23

## 2023-09-23 RX ORDER — NALOXONE HYDROCHLORIDE 4 MG/.1ML
4 SPRAY NASAL
COMMUNITY

## 2023-09-23 RX ORDER — DEXTROSE 4 G
TABLET,CHEWABLE ORAL 3 TIMES DAILY
COMMUNITY
End: 2023-11-28 | Stop reason: WASHOUT

## 2023-09-23 RX ORDER — CYCLOBENZAPRINE HCL 10 MG
10 TABLET ORAL 2 TIMES DAILY PRN
COMMUNITY
End: 2024-04-18 | Stop reason: ALTCHOICE

## 2023-09-23 RX ORDER — BLOOD-GLUCOSE METER
EACH MISCELLANEOUS 3 TIMES DAILY
COMMUNITY
End: 2023-11-28 | Stop reason: WASHOUT

## 2023-10-12 ENCOUNTER — PATIENT OUTREACH (OUTPATIENT)
Dept: CARE COORDINATION | Facility: CLINIC | Age: 53
End: 2023-10-12
Payer: COMMERCIAL

## 2023-10-12 NOTE — PROGRESS NOTES
Several attempts have been made to contact Azucena to offer her Milestones or a dietitian.  She has had the Pharm D med review for VBID and should be receiving her diabetes meds and supplies at $0 co-pays now.  The [facility] program will now be closed, however Azucena can always call back to re-open.    [phone]

## 2023-10-16 ENCOUNTER — PHARMACY VISIT (OUTPATIENT)
Dept: PHARMACY | Facility: CLINIC | Age: 53
End: 2023-10-16
Payer: COMMERCIAL

## 2023-10-16 DIAGNOSIS — G43.809 OTHER MIGRAINE WITHOUT STATUS MIGRAINOSUS, NOT INTRACTABLE: ICD-10-CM

## 2023-10-17 DIAGNOSIS — G43.809 OTHER MIGRAINE WITHOUT STATUS MIGRAINOSUS, NOT INTRACTABLE: ICD-10-CM

## 2023-10-17 RX ORDER — TOPIRAMATE 100 MG/1
CAPSULE, EXTENDED RELEASE ORAL
Qty: 90 CAPSULE | Refills: 0 | Status: SHIPPED | OUTPATIENT
Start: 2023-10-17 | End: 2024-03-04 | Stop reason: SDUPTHER

## 2023-10-17 RX ORDER — TOPIRAMATE 50 MG/1
CAPSULE, EXTENDED RELEASE ORAL
Qty: 90 CAPSULE | Refills: 0 | OUTPATIENT
Start: 2023-10-17 | End: 2024-10-16

## 2023-10-17 RX ORDER — TOPIRAMATE 100 MG/1
CAPSULE, EXTENDED RELEASE ORAL
Qty: 90 CAPSULE | Refills: 0 | OUTPATIENT
Start: 2023-10-17 | End: 2024-10-16

## 2023-10-18 ENCOUNTER — PHARMACY VISIT (OUTPATIENT)
Dept: PHARMACY | Facility: CLINIC | Age: 53
End: 2023-10-18
Payer: COMMERCIAL

## 2023-10-18 ENCOUNTER — APPOINTMENT (OUTPATIENT)
Dept: PRIMARY CARE | Facility: CLINIC | Age: 53
End: 2023-10-18
Payer: COMMERCIAL

## 2023-10-18 PROCEDURE — RXMED WILLOW AMBULATORY MEDICATION CHARGE

## 2023-10-23 ENCOUNTER — PHARMACY VISIT (OUTPATIENT)
Dept: PHARMACY | Facility: CLINIC | Age: 53
End: 2023-10-23
Payer: COMMERCIAL

## 2023-10-23 RX ORDER — TOPIRAMATE 50 MG/1
CAPSULE, EXTENDED RELEASE ORAL
Qty: 90 CAPSULE | Refills: 0 | OUTPATIENT
Start: 2023-10-23 | End: 2024-10-22

## 2023-10-24 ENCOUNTER — PHARMACY VISIT (OUTPATIENT)
Dept: PHARMACY | Facility: CLINIC | Age: 53
End: 2023-10-24
Payer: COMMERCIAL

## 2023-10-24 ENCOUNTER — OFFICE VISIT (OUTPATIENT)
Dept: ENDOCRINOLOGY | Facility: CLINIC | Age: 53
End: 2023-10-24
Payer: COMMERCIAL

## 2023-10-24 DIAGNOSIS — E11.65 POORLY CONTROLLED TYPE 2 DIABETES MELLITUS (MULTI): Primary | ICD-10-CM

## 2023-10-24 DIAGNOSIS — E03.9 HYPOTHYROIDISM, UNSPECIFIED TYPE: ICD-10-CM

## 2023-10-24 DIAGNOSIS — Z79.4 LONG TERM CURRENT USE OF INSULIN (MULTI): ICD-10-CM

## 2023-10-24 LAB — POC HEMOGLOBIN A1C: 9.5 % (ref 4.2–6.5)

## 2023-10-24 PROCEDURE — 1036F TOBACCO NON-USER: CPT | Performed by: INTERNAL MEDICINE

## 2023-10-24 PROCEDURE — 3046F HEMOGLOBIN A1C LEVEL >9.0%: CPT | Performed by: INTERNAL MEDICINE

## 2023-10-24 PROCEDURE — RXMED WILLOW AMBULATORY MEDICATION CHARGE

## 2023-10-24 PROCEDURE — 3008F BODY MASS INDEX DOCD: CPT | Performed by: INTERNAL MEDICINE

## 2023-10-24 PROCEDURE — 99214 OFFICE O/P EST MOD 30 MIN: CPT | Performed by: INTERNAL MEDICINE

## 2023-10-24 PROCEDURE — 83036 HEMOGLOBIN GLYCOSYLATED A1C: CPT | Performed by: INTERNAL MEDICINE

## 2023-10-24 RX ORDER — LEVOTHYROXINE SODIUM 100 UG/1
100 TABLET ORAL
Qty: 90 TABLET | Refills: 3 | Status: SHIPPED | OUTPATIENT
Start: 2023-10-24 | End: 2024-10-23

## 2023-10-24 RX ORDER — FLASH GLUCOSE SCANNING READER
EACH MISCELLANEOUS
Qty: 1 EACH | Refills: 0 | Status: SHIPPED | OUTPATIENT
Start: 2023-10-24

## 2023-10-24 RX ORDER — FLASH GLUCOSE SENSOR
KIT MISCELLANEOUS
Qty: 6 EACH | Refills: 3 | Status: SHIPPED | OUTPATIENT
Start: 2023-10-24

## 2023-10-24 RX ORDER — INSULIN GLARGINE-YFGN 100 [IU]/ML
35 INJECTION, SOLUTION SUBCUTANEOUS NIGHTLY
Qty: 45 ML | Refills: 3 | Status: ON HOLD | OUTPATIENT
Start: 2023-10-24 | End: 2024-05-22

## 2023-10-24 ASSESSMENT — ENCOUNTER SYMPTOMS
UNEXPECTED WEIGHT CHANGE: 1
WEAKNESS: 1
VOMITING: 1
DIARRHEA: 1
SHORTNESS OF BREATH: 0
NAUSEA: 1
BACK PAIN: 1

## 2023-10-24 NOTE — LETTER
October 24, 2023     Renee Ty MD  [facility], [address] [address]    Patient: Azucena Hagan   YOB: 1970   Date of Visit: 10/24/2023       Dear Dr. Renee Ty MD:    Thank you for referring Azucena Hagan to me for evaluation. Below are my notes for this consultation.  If you have questions, please do not hesitate to call me. I look forward to following your patient along with you.       Sincerely,     Ramesh Diaz MD      CC: No Recipients  ______________________________________________________________________________________    History Of Present Illness  Azucena Hagan is a 53 y.o. female     Duration of type 2 diabetes mellitus:  27 years  Complications:  none    Recent cough, chills, aches  COVID-19 test negative  History of Long COVID     Back pain the past week, using a wheelchair right now.     Insulin Glargine 28 units at bedtime.     Allergic symptoms pruritus on insulin aspart, resolved off aspart  History of fluid retention on Novolog Flexpen    FreeStyle Alvarado 3--sensors failing and takes too much iPhone battery  She wants to change to Alvarado 2  Patient is testing glucose 288 times daily  Records reviewed, on file    Last eye exam:  2021  Missed August 2023 appointment    Hypothyroidism  Synthroid (GUIDO)    Past Medical History  She has a past medical history of Candidiasis, unspecified (12/27/2017), Dorsalgia, unspecified (04/23/2015), Gastro-esophageal reflux disease without esophagitis (04/21/2021), Generalized anxiety disorder (10/05/2022), Hypothyroidism, unspecified (11/17/2022), Ocular pain, left eye (06/06/2021), Other conditions influencing health status (11/16/2022), Otitis media, unspecified, unspecified ear (03/19/2018), Personal history of other diseases of the circulatory system, Personal history of other diseases of the respiratory system (01/29/2018), Personal history of other infectious and parasitic  diseases (08/13/2018), Personal history of other infectious and parasitic diseases (11/17/2015), Personal history of other specified conditions, Personal history of other specified conditions (03/12/2019), Personal history of other specified conditions (07/10/2014), Personal history of other specified conditions (08/08/2018), Pure hypercholesterolemia, unspecified (02/16/2022), Radiculopathy, lumbar region (05/20/2016), and Tachycardia, unspecified (02/16/2022).    Surgical History  She has a past surgical history that includes Tonsillectomy (03/04/2014); Tubal ligation (08/26/2014); and Mouth surgery (08/26/2014).     Social History  She reports that she has never smoked. She has never been exposed to tobacco smoke. She has never used smokeless tobacco. She reports that she does not drink alcohol and does not use drugs.    Family History  Family History   Problem Relation Name Age of Onset   • Anxiety disorder Mother     • Other (back injury) Mother     • Depression Mother          recurrent   • Other (cardiac disorder) Father     • Heart attack Father     • Anxiety disorder Brother     • Hypertension Brother     • Other (king disease) Brother         Allergies  Bee venom protein (honey bee), Nut - unspecified, Pecan nut, House dust, Humulin r regular u-100 insuln [insulin regular human], Hydrocodone-acetaminophen, Insulin regular, Nickel, Red dye, and Tree and shrub pollen    Review of Systems   Constitutional:  Positive for unexpected weight change.   Eyes:  Negative for visual disturbance.   Respiratory:  Negative for shortness of breath.    Cardiovascular:  Negative for chest pain.   Gastrointestinal:  Positive for diarrhea, nausea and vomiting.   Endocrine: Positive for cold intolerance.   Musculoskeletal:  Positive for back pain.   Neurological:  Positive for weakness.         Last Recorded Vitals  There were no vitals taken for this visit.    Physical Exam  Constitutional:       General: She is not in acute  distress.     Comments: Examined in wheelchair   Eyes:      Extraocular Movements: Extraocular movements intact.   Cardiovascular:      Pulses:           Radial pulses are 2+ on the right side and 2+ on the left side.   Musculoskeletal:      Right lower leg: No edema.      Left lower leg: No edema.   Neurological:      Mental Status: She is alert.      Motor: No tremor.          Relevant Results  Glucose   Date Value   06/21/2023 183 mg/dL (H)   06/20/2023 181 mg/dL (H)   06/19/2023 CANCELED     Hemoglobin A1C   Date Value   06/21/2023 13.6 % (A)   06/20/2023 13.8 % (A)   06/19/2023 CANCELED     Bicarbonate   Date Value   06/21/2023 21 mmol/L   06/20/2023 20 mmol/L (L)   06/19/2023 CANCELED     Urea Nitrogen   Date Value   06/21/2023 2 mg/dL (L)   06/20/2023 5 mg/dL (L)   06/19/2023 CANCELED     Creatinine   Date Value   06/21/2023 0.60 mg/dL   06/20/2023 0.50 mg/dL   06/19/2023 CANCELED     Lab Results   Component Value Date    TSH 4.07 (H) 06/17/2023       IMPRESSION  TYPE 2 DIABETES MELLITUS WITH HYPERGLYCEMIA  LONG TERM CURRENT INSULIN USE  Rapid A1c 9.5%  A1c high but improved  Unable to use FreeStyle Alvarado 3  New allergic reaction to insulin aspart  Known side effects to regular insulin      RECOMMENDATIONS  Insulin glargine increase to 35 units at bedtime  If fasting glucose is over 180 for 3 days, increase by 3 units.     Follow up 3 months  A1c at next appointment if not already done.    FreeStyle Alvarado 2  Bring reader to all appointments.

## 2023-10-24 NOTE — PATIENT INSTRUCTIONS
Rapid A1c 9.5%    RECOMMENDATIONS  Insulin glargine increase to 35 units at bedtime  If fasting glucose is over 180 for 3 days, increase by 3 units.     Follow up 3 months  A1c at next appointment if not already done.    FreeStyle Alvarado 2  Bring reader to all appointments.

## 2023-10-24 NOTE — PROGRESS NOTES
History Of Present Illness  Azucena Hagan is a 53 y.o. female     Duration of type 2 diabetes mellitus:  27 years  Complications:  none    Recent cough, chills, aches  COVID-19 test negative  History of Long COVID     Back pain the past week, using a wheelchair right now.     Insulin Glargine 28 units at bedtime.     Allergic symptoms pruritus on insulin aspart, resolved off aspart  History of fluid retention on Novolog Flexpen    FreeStyle Alvarado 3--sensors failing and takes too much iPhone battery  She wants to change to Alvarado 2  Patient is testing glucose 288 times daily  Records reviewed, on file    Last eye exam:  2021  Missed August 2023 appointment    Hypothyroidism  Synthroid (GUIDO)    Past Medical History  She has a past medical history of Candidiasis, unspecified (12/27/2017), Dorsalgia, unspecified (04/23/2015), Gastro-esophageal reflux disease without esophagitis (04/21/2021), Generalized anxiety disorder (10/05/2022), Hypothyroidism, unspecified (11/17/2022), Ocular pain, left eye (06/06/2021), Other conditions influencing health status (11/16/2022), Otitis media, unspecified, unspecified ear (03/19/2018), Personal history of other diseases of the circulatory system, Personal history of other diseases of the respiratory system (01/29/2018), Personal history of other infectious and parasitic diseases (08/13/2018), Personal history of other infectious and parasitic diseases (11/17/2015), Personal history of other specified conditions, Personal history of other specified conditions (03/12/2019), Personal history of other specified conditions (07/10/2014), Personal history of other specified conditions (08/08/2018), Pure hypercholesterolemia, unspecified (02/16/2022), Radiculopathy, lumbar region (05/20/2016), and Tachycardia, unspecified (02/16/2022).    Surgical History  She has a past surgical history that includes Tonsillectomy (03/04/2014); Tubal ligation (08/26/2014); and Mouth surgery  (08/26/2014).     Social History  She reports that she has never smoked. She has never been exposed to tobacco smoke. She has never used smokeless tobacco. She reports that she does not drink alcohol and does not use drugs.    Family History  Family History   Problem Relation Name Age of Onset    Anxiety disorder Mother      Other (back injury) Mother      Depression Mother          recurrent    Other (cardiac disorder) Father      Heart attack Father      Anxiety disorder Brother      Hypertension Brother      Other (king disease) Brother         Allergies  Bee venom protein (honey bee), Nut - unspecified, Pecan nut, House dust, Humulin r regular u-100 insuln [insulin regular human], Hydrocodone-acetaminophen, Insulin regular, Nickel, Red dye, and Tree and shrub pollen    Review of Systems   Constitutional:  Positive for unexpected weight change.   Eyes:  Negative for visual disturbance.   Respiratory:  Negative for shortness of breath.    Cardiovascular:  Negative for chest pain.   Gastrointestinal:  Positive for diarrhea, nausea and vomiting.   Endocrine: Positive for cold intolerance.   Musculoskeletal:  Positive for back pain.   Neurological:  Positive for weakness.         Last Recorded Vitals  There were no vitals taken for this visit.    Physical Exam  Constitutional:       General: She is not in acute distress.     Comments: Examined in wheelchair   Eyes:      Extraocular Movements: Extraocular movements intact.   Cardiovascular:      Pulses:           Radial pulses are 2+ on the right side and 2+ on the left side.   Musculoskeletal:      Right lower leg: No edema.      Left lower leg: No edema.   Neurological:      Mental Status: She is alert.      Motor: No tremor.          Relevant Results  Glucose   Date Value   06/21/2023 183 mg/dL (H)   06/20/2023 181 mg/dL (H)   06/19/2023 CANCELED     Hemoglobin A1C   Date Value   06/21/2023 13.6 % (A)   06/20/2023 13.8 % (A)   06/19/2023 CANCELED     Bicarbonate    Date Value   06/21/2023 21 mmol/L   06/20/2023 20 mmol/L (L)   06/19/2023 CANCELED     Urea Nitrogen   Date Value   06/21/2023 2 mg/dL (L)   06/20/2023 5 mg/dL (L)   06/19/2023 CANCELED     Creatinine   Date Value   06/21/2023 0.60 mg/dL   06/20/2023 0.50 mg/dL   06/19/2023 CANCELED     Lab Results   Component Value Date    TSH 4.07 (H) 06/17/2023       IMPRESSION  TYPE 2 DIABETES MELLITUS WITH HYPERGLYCEMIA  LONG TERM CURRENT INSULIN USE  Rapid A1c 9.5%  A1c high but improved  Unable to use FreeStyle Alvarado 3  New allergic reaction to insulin aspart  Known side effects to regular insulin      RECOMMENDATIONS  Insulin glargine increase to 35 units at bedtime  If fasting glucose is over 180 for 3 days, increase by 3 units.     Follow up 3 months  A1c at next appointment if not already done.    FreeStyle Alvarado 2  Bring reader to all appointments.

## 2023-10-25 ENCOUNTER — PHARMACY VISIT (OUTPATIENT)
Dept: PHARMACY | Facility: CLINIC | Age: 53
End: 2023-10-25
Payer: COMMERCIAL

## 2023-11-16 ENCOUNTER — APPOINTMENT (OUTPATIENT)
Dept: BEHAVIORAL HEALTH | Facility: CLINIC | Age: 53
End: 2023-11-16
Payer: COMMERCIAL

## 2023-11-20 ENCOUNTER — HOSPITAL ENCOUNTER (EMERGENCY)
Facility: HOSPITAL | Age: 53
Discharge: LEFT BEFORE DISCHARGE | End: 2023-11-21
Attending: FAMILY MEDICINE
Payer: COMMERCIAL

## 2023-11-20 ENCOUNTER — HOSPITAL ENCOUNTER (INPATIENT)
Facility: HOSPITAL | Age: 53
End: 2023-11-20
Attending: INTERNAL MEDICINE | Admitting: INTERNAL MEDICINE
Payer: COMMERCIAL

## 2023-11-20 ENCOUNTER — APPOINTMENT (OUTPATIENT)
Dept: RADIOLOGY | Facility: HOSPITAL | Age: 53
End: 2023-11-20
Payer: COMMERCIAL

## 2023-11-20 DIAGNOSIS — Z53.29 LEFT AGAINST MEDICAL ADVICE: ICD-10-CM

## 2023-11-20 DIAGNOSIS — N39.0 URINARY TRACT INFECTION WITHOUT HEMATURIA, SITE UNSPECIFIED: ICD-10-CM

## 2023-11-20 DIAGNOSIS — I21.4 NSTEMI (NON-ST ELEVATED MYOCARDIAL INFARCTION) (MULTI): Primary | ICD-10-CM

## 2023-11-20 LAB
ALBUMIN SERPL BCP-MCNC: 3.9 G/DL (ref 3.4–5)
ALP SERPL-CCNC: 48 U/L (ref 33–110)
ALT SERPL W P-5'-P-CCNC: 11 U/L (ref 7–45)
ANION GAP SERPL CALC-SCNC: 14 MMOL/L (ref 10–20)
APPEARANCE UR: ABNORMAL
AST SERPL W P-5'-P-CCNC: 12 U/L (ref 9–39)
BASOPHILS # BLD AUTO: 0.05 X10*3/UL (ref 0–0.1)
BASOPHILS NFR BLD AUTO: 0.5 %
BILIRUB SERPL-MCNC: 1 MG/DL (ref 0–1.2)
BILIRUB UR STRIP.AUTO-MCNC: NEGATIVE MG/DL
BUN SERPL-MCNC: 24 MG/DL (ref 6–23)
CALCIUM SERPL-MCNC: 9.2 MG/DL (ref 8.6–10.3)
CARDIAC TROPONIN I PNL SERPL HS: 150 NG/L (ref 0–13)
CARDIAC TROPONIN I PNL SERPL HS: 166 NG/L (ref 0–13)
CARDIAC TROPONIN I PNL SERPL HS: 232 NG/L (ref 0–13)
CHLORIDE SERPL-SCNC: 94 MMOL/L (ref 98–107)
CO2 SERPL-SCNC: 25 MMOL/L (ref 21–32)
COLOR UR: YELLOW
CREAT SERPL-MCNC: 1.24 MG/DL (ref 0.5–1.05)
EOSINOPHIL # BLD AUTO: 0.1 X10*3/UL (ref 0–0.7)
EOSINOPHIL NFR BLD AUTO: 1 %
ERYTHROCYTE [DISTWIDTH] IN BLOOD BY AUTOMATED COUNT: 12 % (ref 11.5–14.5)
FLUAV RNA RESP QL NAA+PROBE: NOT DETECTED
FLUBV RNA RESP QL NAA+PROBE: NOT DETECTED
GFR SERPL CREATININE-BSD FRML MDRD: 53 ML/MIN/1.73M*2
GLUCOSE SERPL-MCNC: 236 MG/DL (ref 74–99)
GLUCOSE UR STRIP.AUTO-MCNC: ABNORMAL MG/DL
HCT VFR BLD AUTO: 41.3 % (ref 36–46)
HGB BLD-MCNC: 14.2 G/DL (ref 12–16)
HYALINE CASTS #/AREA URNS AUTO: ABNORMAL /LPF
IMM GRANULOCYTES # BLD AUTO: 0.08 X10*3/UL (ref 0–0.7)
IMM GRANULOCYTES NFR BLD AUTO: 0.8 % (ref 0–0.9)
INR PPP: 1.1 (ref 0.9–1.1)
KETONES UR STRIP.AUTO-MCNC: ABNORMAL MG/DL
LACTATE SERPL-SCNC: 1.2 MMOL/L (ref 0.4–2)
LEUKOCYTE ESTERASE UR QL STRIP.AUTO: ABNORMAL
LYMPHOCYTES # BLD AUTO: 3.74 X10*3/UL (ref 1.2–4.8)
LYMPHOCYTES NFR BLD AUTO: 39.2 %
MAGNESIUM SERPL-MCNC: 1.9 MG/DL (ref 1.6–2.4)
MCH RBC QN AUTO: 29.7 PG (ref 26–34)
MCHC RBC AUTO-ENTMCNC: 34.4 G/DL (ref 32–36)
MCV RBC AUTO: 86 FL (ref 80–100)
MONOCYTES # BLD AUTO: 0.56 X10*3/UL (ref 0.1–1)
MONOCYTES NFR BLD AUTO: 5.9 %
NEUTROPHILS # BLD AUTO: 5.01 X10*3/UL (ref 1.2–7.7)
NEUTROPHILS NFR BLD AUTO: 52.6 %
NITRITE UR QL STRIP.AUTO: NEGATIVE
NRBC BLD-RTO: 0 /100 WBCS (ref 0–0)
PH UR STRIP.AUTO: 5 [PH]
PLATELET # BLD AUTO: 425 X10*3/UL (ref 150–450)
POTASSIUM SERPL-SCNC: 3 MMOL/L (ref 3.5–5.3)
PROT SERPL-MCNC: 7.1 G/DL (ref 6.4–8.2)
PROT UR STRIP.AUTO-MCNC: ABNORMAL MG/DL
PROTHROMBIN TIME: 12.8 SECONDS (ref 9.8–12.8)
RBC # BLD AUTO: 4.78 X10*6/UL (ref 4–5.2)
RBC # UR STRIP.AUTO: ABNORMAL /UL
RBC #/AREA URNS AUTO: ABNORMAL /HPF
SARS-COV-2 RNA RESP QL NAA+PROBE: NOT DETECTED
SODIUM SERPL-SCNC: 130 MMOL/L (ref 136–145)
SP GR UR STRIP.AUTO: 1.05
SQUAMOUS #/AREA URNS AUTO: ABNORMAL /HPF
UROBILINOGEN UR STRIP.AUTO-MCNC: <2 MG/DL
WBC # BLD AUTO: 9.5 X10*3/UL (ref 4.4–11.3)
WBC #/AREA URNS AUTO: ABNORMAL /HPF

## 2023-11-20 PROCEDURE — 85025 COMPLETE CBC W/AUTO DIFF WBC: CPT | Performed by: PHYSICIAN ASSISTANT

## 2023-11-20 PROCEDURE — 2500000004 HC RX 250 GENERAL PHARMACY W/ HCPCS (ALT 636 FOR OP/ED): Performed by: PHYSICIAN ASSISTANT

## 2023-11-20 PROCEDURE — 96361 HYDRATE IV INFUSION ADD-ON: CPT

## 2023-11-20 PROCEDURE — 36415 COLL VENOUS BLD VENIPUNCTURE: CPT | Performed by: PHYSICIAN ASSISTANT

## 2023-11-20 PROCEDURE — 85025 COMPLETE CBC W/AUTO DIFF WBC: CPT | Performed by: FAMILY MEDICINE

## 2023-11-20 PROCEDURE — 81001 URINALYSIS AUTO W/SCOPE: CPT | Performed by: PHYSICIAN ASSISTANT

## 2023-11-20 PROCEDURE — 2500000001 HC RX 250 WO HCPCS SELF ADMINISTERED DRUGS (ALT 637 FOR MEDICARE OP): Performed by: PHYSICIAN ASSISTANT

## 2023-11-20 PROCEDURE — 71046 X-RAY EXAM CHEST 2 VIEWS: CPT

## 2023-11-20 PROCEDURE — 87636 SARSCOV2 & INF A&B AMP PRB: CPT | Performed by: PHYSICIAN ASSISTANT

## 2023-11-20 PROCEDURE — 2550000001 HC RX 255 CONTRASTS: Performed by: PHYSICIAN ASSISTANT

## 2023-11-20 PROCEDURE — 96365 THER/PROPH/DIAG IV INF INIT: CPT | Mod: 59

## 2023-11-20 PROCEDURE — 74177 CT ABD & PELVIS W/CONTRAST: CPT

## 2023-11-20 PROCEDURE — 80053 COMPREHEN METABOLIC PANEL: CPT | Performed by: PHYSICIAN ASSISTANT

## 2023-11-20 PROCEDURE — 74177 CT ABD & PELVIS W/CONTRAST: CPT | Performed by: RADIOLOGY

## 2023-11-20 PROCEDURE — 83605 ASSAY OF LACTIC ACID: CPT | Performed by: PHYSICIAN ASSISTANT

## 2023-11-20 PROCEDURE — 83605 ASSAY OF LACTIC ACID: CPT | Performed by: FAMILY MEDICINE

## 2023-11-20 PROCEDURE — 80053 COMPREHEN METABOLIC PANEL: CPT | Performed by: FAMILY MEDICINE

## 2023-11-20 PROCEDURE — 84484 ASSAY OF TROPONIN QUANT: CPT | Performed by: PHYSICIAN ASSISTANT

## 2023-11-20 PROCEDURE — 85610 PROTHROMBIN TIME: CPT | Performed by: FAMILY MEDICINE

## 2023-11-20 PROCEDURE — 85610 PROTHROMBIN TIME: CPT | Performed by: PHYSICIAN ASSISTANT

## 2023-11-20 PROCEDURE — 96366 THER/PROPH/DIAG IV INF ADDON: CPT

## 2023-11-20 PROCEDURE — 81001 URINALYSIS AUTO W/SCOPE: CPT | Performed by: FAMILY MEDICINE

## 2023-11-20 PROCEDURE — 84484 ASSAY OF TROPONIN QUANT: CPT | Performed by: FAMILY MEDICINE

## 2023-11-20 PROCEDURE — 99285 EMERGENCY DEPT VISIT HI MDM: CPT | Mod: 25 | Performed by: FAMILY MEDICINE

## 2023-11-20 PROCEDURE — 71046 X-RAY EXAM CHEST 2 VIEWS: CPT | Performed by: RADIOLOGY

## 2023-11-20 PROCEDURE — 83735 ASSAY OF MAGNESIUM: CPT | Performed by: PHYSICIAN ASSISTANT

## 2023-11-20 PROCEDURE — 94761 N-INVAS EAR/PLS OXIMETRY MLT: CPT

## 2023-11-20 RX ORDER — NAPROXEN SODIUM 220 MG/1
324 TABLET, FILM COATED ORAL ONCE
Status: COMPLETED | OUTPATIENT
Start: 2023-11-20 | End: 2023-11-20

## 2023-11-20 RX ORDER — ALPRAZOLAM 0.25 MG/1
1 TABLET ORAL ONCE
Status: COMPLETED | OUTPATIENT
Start: 2023-11-20 | End: 2023-11-20

## 2023-11-20 RX ORDER — CEFTRIAXONE 2 G/50ML
2 INJECTION, SOLUTION INTRAVENOUS ONCE
Status: COMPLETED | OUTPATIENT
Start: 2023-11-20 | End: 2023-11-20

## 2023-11-20 RX ADMIN — SODIUM CHLORIDE 1000 ML: 9 INJECTION, SOLUTION INTRAVENOUS at 15:35

## 2023-11-20 RX ADMIN — CEFTRIAXONE 2 G: 2 INJECTION, SOLUTION INTRAVENOUS at 21:55

## 2023-11-20 RX ADMIN — IOHEXOL 75 ML: 350 INJECTION, SOLUTION INTRAVENOUS at 16:58

## 2023-11-20 RX ADMIN — SODIUM CHLORIDE 1000 ML: 9 INJECTION, SOLUTION INTRAVENOUS at 17:30

## 2023-11-20 RX ADMIN — ASPIRIN 81 MG CHEWABLE TABLET 324 MG: 81 TABLET CHEWABLE at 21:55

## 2023-11-20 RX ADMIN — ALPRAZOLAM 1 MG: 0.25 TABLET ORAL at 19:24

## 2023-11-20 ASSESSMENT — COLUMBIA-SUICIDE SEVERITY RATING SCALE - C-SSRS
6. HAVE YOU EVER DONE ANYTHING, STARTED TO DO ANYTHING, OR PREPARED TO DO ANYTHING TO END YOUR LIFE?: NO
2. HAVE YOU ACTUALLY HAD ANY THOUGHTS OF KILLING YOURSELF?: NO
1. IN THE PAST MONTH, HAVE YOU WISHED YOU WERE DEAD OR WISHED YOU COULD GO TO SLEEP AND NOT WAKE UP?: NO

## 2023-11-20 ASSESSMENT — PAIN DESCRIPTION - LOCATION: LOCATION: BACK

## 2023-11-20 ASSESSMENT — PAIN - FUNCTIONAL ASSESSMENT: PAIN_FUNCTIONAL_ASSESSMENT: 0-10

## 2023-11-20 ASSESSMENT — PAIN SCALES - GENERAL: PAINLEVEL_OUTOF10: 6

## 2023-11-20 NOTE — Clinical Note
You are leaving against medical was advised your blood test is suspicious for drug you have a UTI and there is a also risk of sepsis cardiopulmonary arrest are even death.  Please please if you change your mind let us know before you leave the premis es or if you change your mind and report to her living facility report there but you are leaving AGAINST MEDICAL ADVICE because of very high risk of heart attack cardiopulmonary arrest, permanent disability or even death and or sepsis.

## 2023-11-20 NOTE — ED TRIAGE NOTES
Pt came to ED today with complaints of syncope and vomiting and back pain pt stated she has been sick for a long time and was recently in [facility] pt stated she started vomiting 2 days ago and hasn't been able to keep anything down pt stated she passed out 2 times today pt denied hitting her head pt  stated she was lowered to the ground pt stated she has chronic back pain but It has been worse lately

## 2023-11-21 ENCOUNTER — PHARMACY VISIT (OUTPATIENT)
Dept: PHARMACY | Facility: CLINIC | Age: 53
End: 2023-11-21
Payer: COMMERCIAL

## 2023-11-21 VITALS
HEART RATE: 113 BPM | OXYGEN SATURATION: 100 % | BODY MASS INDEX: 20.98 KG/M2 | HEIGHT: 62 IN | WEIGHT: 114 LBS | SYSTOLIC BLOOD PRESSURE: 110 MMHG | RESPIRATION RATE: 14 BRPM | DIASTOLIC BLOOD PRESSURE: 75 MMHG

## 2023-11-21 PROBLEM — Z53.29 LEFT AGAINST MEDICAL ADVICE: Status: ACTIVE | Noted: 2023-11-21

## 2023-11-21 PROBLEM — I21.4 NSTEMI (NON-ST ELEVATED MYOCARDIAL INFARCTION) (MULTI): Status: ACTIVE | Noted: 2023-11-21

## 2023-11-21 PROCEDURE — RXMED WILLOW AMBULATORY MEDICATION CHARGE

## 2023-11-21 RX ORDER — SULFAMETHOXAZOLE AND TRIMETHOPRIM 800; 160 MG/1; MG/1
1 TABLET ORAL 2 TIMES DAILY
Qty: 14 TABLET | Refills: 0 | Status: SHIPPED | OUTPATIENT
Start: 2023-11-21 | End: 2023-11-28

## 2023-11-21 NOTE — ED PROVIDER NOTES
HPI   Chief Complaint   Patient presents with    Syncope    Vomiting    Back Pain       HPI                    [place] Coma Scale Score: 15                  Patient History   Past Medical History:   Diagnosis Date    Candidiasis, unspecified 12/27/2017    Yeast infection    Dorsalgia, unspecified 04/23/2015    Dorsodynia    Gastro-esophageal reflux disease without esophagitis 04/21/2021    GERD (gastroesophageal reflux disease)    Generalized anxiety disorder 10/05/2022    Generalized anxiety disorder with panic attacks    Hypothyroidism, unspecified 11/17/2022    Hypothyroidism    Ocular pain, left eye 06/06/2021    Left eye pain    Other conditions influencing health status 11/16/2022    Diabetes mellitus type 2, uncontrolled    Otitis media, unspecified, unspecified ear 03/19/2018    Recurrent otitis media    Personal history of other diseases of the circulatory system     History of hypertension    Personal history of other diseases of the respiratory system 01/29/2018    History of acute sinusitis    Personal history of other infectious and parasitic diseases 08/13/2018    History of candidiasis    Personal history of other infectious and parasitic diseases 11/17/2015    History of viral infection    Personal history of other specified conditions     History of nausea    Personal history of other specified conditions 03/12/2019    History of vomiting    Personal history of other specified conditions 07/10/2014    History of diarrhea    Personal history of other specified conditions 08/08/2018    History of headache    Pure hypercholesterolemia, unspecified 02/16/2022    Hypercholesterolemia    Radiculopathy, lumbar region 05/20/2016    Right lumbar radiculopathy    Tachycardia, unspecified 02/16/2022    Tachycardia     Past Surgical History:   Procedure Laterality Date    MOUTH SURGERY  08/26/2014    Oral Surgery Tooth Extraction    TONSILLECTOMY  03/04/2014    Tonsillectomy With Adenoidectomy    TUBAL LIGATION   08/26/2014    Tubal Ligation     Family History   Problem Relation Name Age of Onset    Anxiety disorder Mother      Other (back injury) Mother      Depression Mother          recurrent    Other (cardiac disorder) Father      Heart attack Father      Anxiety disorder Brother      Hypertension Brother      Other (king disease) Brother       Social History     Tobacco Use    Smoking status: Never     Passive exposure: Never    Smokeless tobacco: Never   Vaping Use    Vaping Use: Never used   Substance Use Topics    Alcohol use: Never    Drug use: Never       Physical Exam   ED Triage Vitals   Temp Heart Rate Resp BP   -- 11/20/23 1526 11/20/23 1526 11/20/23 1515    (!) 124 20 90/78      SpO2 Temp src Heart Rate Source Patient Position   11/20/23 1515 -- 11/20/23 2202 11/20/23 1526   (!) 87 %  Monitor Sitting      BP Location FiO2 (%)     11/20/23 1526 --     Right arm        Physical Exam    ED Course & MDM   ED Course as of 11/21/23 0153   Mon Nov 20, 2023   2252 EKG taken at 1857 on 11/20/2023 shows sinus tachycardia 119 prolonged QT at 590, no ST elevation depression present no T wave inversion no STEMI [JF]   2253 Normal sinus rhythm at 85 left axis deviation, incomplete right bundle branch block EKG taken at 1306 on November 20, 2023 no STEMI [JF]   2259 EKG taken at 1522 November 20, 2023 shows sinus tachycardia 122 left axis deviation no ST elevation depression present no T wave inversion [JF]   2259 EKG taken at 1700 and November 20, 2023 shows sinus tach rate 123 no ST elevation depression present no T wave inversion [JF]      ED Course User Index  [JF] Ian Baxter PA-C         Diagnoses as of 11/21/23 0153   NSTEMI (non-ST elevated myocardial infarction) (CMS/HCC)   Urinary tract infection without hematuria, site unspecified   Left against medical advice       Medical Decision Making  This patient seen evaluated and treated by midlevel hours before my arrival patient has elevated troponin and also  UTI.  She is waiting to be transferred to [facility] however patient decided to leave AGAINST MEDICAL ADVICE I spent quite a bit time in talking to her risks and benefit explained the patient her  works for over [employer] system also at bedside they both understood risk and benefit well and she does not want to be transferred to [facility] where she previously waited for 36 hours and did not get a bed she want to be transferred to any other facility.  She understood risk and benefit well and signing AGAINST MEDICAL ADVICE.  She is fully aware that she is high risk for cardiopulmonary arrest pulmonary disability or death and/or sepsis.  Patient be discharged home with antibiotic however she is leaving AGAINST MEDICAL ADVICE.  Please refer to midlevel complete note history and physical examination I only got involved in patient leaving AGAINST MEDICAL ADVICE and of her care in the ER.    Ivis Mckeon M.D  You are leaving against medical was advised your blood test is suspicious for drug you have a UTI and there is a also risk of sepsis cardiopulmonary arrest are even death.  Please please if you change your mind let us know before you leave the premises or if you change your mind and report to her living facility report there but you are leaving AGAINST MEDICAL ADVICE because of very high risk of heart attack cardiopulmonary arrest, permanent disability or even death and or sepsis.  Follow-up with your cardiologist and primary care physician but the best option is to return to ER for appropriate evaluation and transfer to appropriate facility.      Procedure  Procedures     Ivis Torres MD  11/21/23 0155       Ivis Torres MD  11/21/23 0156

## 2023-11-21 NOTE — DISCHARGE INSTRUCTIONS
You are leaving against medical was advised your blood test is suspicious for drug you have a UTI and there is a also risk of sepsis cardiopulmonary arrest are even death.  Please please if you change your mind let us know before you leave the premises or if you change your mind and report to her living facility report there but you are leaving AGAINST MEDICAL ADVICE because of very high risk of heart attack cardiopulmonary arrest, permanent disability or even death and or sepsis.  Follow-up with your cardiologist and primary care physician but the best option is to return to ER for appropriate evaluation and transfer to appropriate facility.

## 2023-11-22 ENCOUNTER — OFFICE VISIT (OUTPATIENT)
Dept: PRIMARY CARE | Facility: CLINIC | Age: 53
End: 2023-11-22
Payer: COMMERCIAL

## 2023-11-22 ENCOUNTER — PHARMACY VISIT (OUTPATIENT)
Dept: PHARMACY | Facility: CLINIC | Age: 53
End: 2023-11-22
Payer: COMMERCIAL

## 2023-11-22 ENCOUNTER — LAB (OUTPATIENT)
Dept: LAB | Facility: LAB | Age: 53
End: 2023-11-22
Payer: COMMERCIAL

## 2023-11-22 VITALS
HEART RATE: 99 BPM | RESPIRATION RATE: 18 BRPM | SYSTOLIC BLOOD PRESSURE: 114 MMHG | OXYGEN SATURATION: 100 % | DIASTOLIC BLOOD PRESSURE: 76 MMHG

## 2023-11-22 DIAGNOSIS — R11.2 NAUSEA AND VOMITING, UNSPECIFIED VOMITING TYPE: ICD-10-CM

## 2023-11-22 DIAGNOSIS — Z91.030 BEE ALLERGY STATUS: ICD-10-CM

## 2023-11-22 DIAGNOSIS — Z91.018 NUT ALLERGY: ICD-10-CM

## 2023-11-22 DIAGNOSIS — I10 BENIGN ESSENTIAL HYPERTENSION: Primary | ICD-10-CM

## 2023-11-22 DIAGNOSIS — M54.16 RIGHT LUMBAR RADICULOPATHY: ICD-10-CM

## 2023-11-22 DIAGNOSIS — I21.4 NSTEMI (NON-ST ELEVATED MYOCARDIAL INFARCTION) (MULTI): ICD-10-CM

## 2023-11-22 DIAGNOSIS — E87.8 ELECTROLYTE ABNORMALITY: ICD-10-CM

## 2023-11-22 DIAGNOSIS — R20.0 LEG NUMBNESS: ICD-10-CM

## 2023-11-22 DIAGNOSIS — N30.00 ACUTE CYSTITIS WITHOUT HEMATURIA: ICD-10-CM

## 2023-11-22 DIAGNOSIS — E28.2 POLYCYSTIC OVARY SYNDROME: ICD-10-CM

## 2023-11-22 PROBLEM — H10.32 ACUTE CONJUNCTIVITIS OF LEFT EYE: Status: RESOLVED | Noted: 2023-04-20 | Resolved: 2023-11-22

## 2023-11-22 PROBLEM — H57.12 LEFT EYE PAIN: Status: RESOLVED | Noted: 2023-04-20 | Resolved: 2023-11-22

## 2023-11-22 PROBLEM — H66.90 ACUTE OTITIS MEDIA: Status: RESOLVED | Noted: 2023-04-20 | Resolved: 2023-11-22

## 2023-11-22 PROBLEM — Z78.9 USES BIRTH CONTROL: Status: RESOLVED | Noted: 2023-08-25 | Resolved: 2023-11-22

## 2023-11-22 PROBLEM — E11.10 DKA (DIABETIC KETOACIDOSIS) (MULTI): Status: RESOLVED | Noted: 2023-08-31 | Resolved: 2023-11-22

## 2023-11-22 LAB
ALBUMIN SERPL BCP-MCNC: 3.7 G/DL (ref 3.4–5)
ALP SERPL-CCNC: 41 U/L (ref 33–110)
ALT SERPL W P-5'-P-CCNC: 17 U/L (ref 7–45)
ANION GAP SERPL CALC-SCNC: 15 MMOL/L (ref 10–20)
AST SERPL W P-5'-P-CCNC: 19 U/L (ref 9–39)
BASOPHILS # BLD AUTO: 0.03 X10*3/UL (ref 0–0.1)
BASOPHILS NFR BLD AUTO: 0.4 %
BILIRUB SERPL-MCNC: 0.4 MG/DL (ref 0–1.2)
BUN SERPL-MCNC: 9 MG/DL (ref 6–23)
CALCIUM SERPL-MCNC: 8.7 MG/DL (ref 8.6–10.3)
CHLORIDE SERPL-SCNC: 104 MMOL/L (ref 98–107)
CO2 SERPL-SCNC: 22 MMOL/L (ref 21–32)
CREAT SERPL-MCNC: 1.1 MG/DL (ref 0.5–1.05)
CRP SERPL-MCNC: 0.3 MG/DL
EOSINOPHIL # BLD AUTO: 0.11 X10*3/UL (ref 0–0.7)
EOSINOPHIL NFR BLD AUTO: 1.4 %
ERYTHROCYTE [DISTWIDTH] IN BLOOD BY AUTOMATED COUNT: 12.9 % (ref 11.5–14.5)
GFR SERPL CREATININE-BSD FRML MDRD: 61 ML/MIN/1.73M*2
GLUCOSE SERPL-MCNC: 136 MG/DL (ref 74–99)
HCT VFR BLD AUTO: 40.8 % (ref 36–46)
HGB BLD-MCNC: 13.1 G/DL (ref 12–16)
IMM GRANULOCYTES # BLD AUTO: 0.03 X10*3/UL (ref 0–0.7)
IMM GRANULOCYTES NFR BLD AUTO: 0.4 % (ref 0–0.9)
LYMPHOCYTES # BLD AUTO: 4.8 X10*3/UL (ref 1.2–4.8)
LYMPHOCYTES NFR BLD AUTO: 59.5 %
MCH RBC QN AUTO: 29.8 PG (ref 26–34)
MCHC RBC AUTO-ENTMCNC: 32.1 G/DL (ref 32–36)
MCV RBC AUTO: 93 FL (ref 80–100)
MONOCYTES # BLD AUTO: 0.46 X10*3/UL (ref 0.1–1)
MONOCYTES NFR BLD AUTO: 5.7 %
NEUTROPHILS # BLD AUTO: 2.64 X10*3/UL (ref 1.2–7.7)
NEUTROPHILS NFR BLD AUTO: 32.6 %
NRBC BLD-RTO: 0 /100 WBCS (ref 0–0)
PLATELET # BLD AUTO: 433 X10*3/UL (ref 150–450)
POTASSIUM SERPL-SCNC: 2.7 MMOL/L (ref 3.5–5.3)
PROT SERPL-MCNC: 6.5 G/DL (ref 6.4–8.2)
RBC # BLD AUTO: 4.39 X10*6/UL (ref 4–5.2)
SODIUM SERPL-SCNC: 138 MMOL/L (ref 136–145)
WBC # BLD AUTO: 8.1 X10*3/UL (ref 4.4–11.3)

## 2023-11-22 PROCEDURE — 85025 COMPLETE CBC W/AUTO DIFF WBC: CPT

## 2023-11-22 PROCEDURE — 80053 COMPREHEN METABOLIC PANEL: CPT

## 2023-11-22 PROCEDURE — 3074F SYST BP LT 130 MM HG: CPT | Performed by: STUDENT IN AN ORGANIZED HEALTH CARE EDUCATION/TRAINING PROGRAM

## 2023-11-22 PROCEDURE — 1036F TOBACCO NON-USER: CPT | Performed by: STUDENT IN AN ORGANIZED HEALTH CARE EDUCATION/TRAINING PROGRAM

## 2023-11-22 PROCEDURE — 36415 COLL VENOUS BLD VENIPUNCTURE: CPT

## 2023-11-22 PROCEDURE — 3046F HEMOGLOBIN A1C LEVEL >9.0%: CPT | Performed by: STUDENT IN AN ORGANIZED HEALTH CARE EDUCATION/TRAINING PROGRAM

## 2023-11-22 PROCEDURE — 3078F DIAST BP <80 MM HG: CPT | Performed by: STUDENT IN AN ORGANIZED HEALTH CARE EDUCATION/TRAINING PROGRAM

## 2023-11-22 PROCEDURE — 3008F BODY MASS INDEX DOCD: CPT | Performed by: STUDENT IN AN ORGANIZED HEALTH CARE EDUCATION/TRAINING PROGRAM

## 2023-11-22 PROCEDURE — 86431 RHEUMATOID FACTOR QUANT: CPT

## 2023-11-22 PROCEDURE — 86038 ANTINUCLEAR ANTIBODIES: CPT

## 2023-11-22 PROCEDURE — 99214 OFFICE O/P EST MOD 30 MIN: CPT | Performed by: STUDENT IN AN ORGANIZED HEALTH CARE EDUCATION/TRAINING PROGRAM

## 2023-11-22 PROCEDURE — 86140 C-REACTIVE PROTEIN: CPT

## 2023-11-22 PROCEDURE — RXMED WILLOW AMBULATORY MEDICATION CHARGE

## 2023-11-22 RX ORDER — EPINEPHRINE 0.3 MG/.3ML
0.3 INJECTION INTRAMUSCULAR ONCE AS NEEDED
Qty: 2 EACH | Refills: 1 | Status: SHIPPED | OUTPATIENT
Start: 2023-11-22

## 2023-11-22 RX ORDER — ONDANSETRON 8 MG/1
8 TABLET, ORALLY DISINTEGRATING ORAL EVERY 8 HOURS PRN
Qty: 42 TABLET | Refills: 0 | Status: SHIPPED | OUTPATIENT
Start: 2023-11-22 | End: 2024-11-21

## 2023-11-22 RX ORDER — LEVONORGESTREL / ETHINYL ESTRADIOL AND ETHINYL ESTRADIOL 150-30(84)
1 KIT ORAL DAILY
Qty: 91 TABLET | Refills: 3 | Status: SHIPPED | OUTPATIENT
Start: 2023-11-22 | End: 2024-05-24 | Stop reason: HOSPADM

## 2023-11-22 ASSESSMENT — PATIENT HEALTH QUESTIONNAIRE - PHQ9
1. LITTLE INTEREST OR PLEASURE IN DOING THINGS: NOT AT ALL
SUM OF ALL RESPONSES TO PHQ9 QUESTIONS 1 AND 2: 0
2. FEELING DOWN, DEPRESSED OR HOPELESS: NOT AT ALL

## 2023-11-22 ASSESSMENT — ENCOUNTER SYMPTOMS
DEPRESSION: 1
OCCASIONAL FEELINGS OF UNSTEADINESS: 1
LOSS OF SENSATION IN FEET: 1

## 2023-11-22 NOTE — PROGRESS NOTES
History Of Present Illness  Azucena Hagan is a 53 y.o. female presents to the office for establishment of care.      11/20/23-ED visit due to NSTEMI, UTI   COVID-19, FLU A/B negative, CXR neg for acute process  Troponin extremely elevated 150 downtrended from 232  CMP: multiple electrolyte abnormality: CR 1.24   CT ABD and Pelvis: Apparent circumferential wall thickening of the entire stomach,   likely due to incomplete distention. Acute gastritis can not be excluded.  Wall thickening of multiple colonic loops and rectum. Differential diagnosis includes incomplete distention and acute proctocolitis.     Has some GI concerns for about a week  Diarrhea, vomiting and nausea     Has been having illnesses since [place], ate out: e.coli dx, severe dehydration     PMHx: HTN, DSL (statin causes cramping), DJD (numbness of the right leg, was following with Dr. Stokes last visit 10/22, previous steroid injections), DM (A1C 9.5%), Lost significant about of weight using keto (>100) and recent illness, Hypothyroid ( TSH 4.7), Long COVID- SOB (improving over time), PCOS (managing with OCPs),     Past Medical History  She has a past medical history of Anxiety, Candidiasis, unspecified (12/27/2017), Disease of thyroid gland, Dorsalgia, unspecified (04/23/2015), Gastro-esophageal reflux disease without esophagitis (04/21/2021), Generalized anxiety disorder (10/05/2022), Hypothyroidism, unspecified (11/17/2022), Ocular pain, left eye (06/06/2021), Other conditions influencing health status (11/16/2022), Otitis media, unspecified, unspecified ear (03/19/2018), Personal history of other diseases of the circulatory system, Personal history of other diseases of the respiratory system (01/29/2018), Personal history of other infectious and parasitic diseases (08/13/2018), Personal history of other infectious and parasitic diseases (11/17/2015), Personal history of other specified conditions, Personal history of other  specified conditions (03/12/2019), Personal history of other specified conditions (07/10/2014), Personal history of other specified conditions (08/08/2018), Pure hypercholesterolemia, unspecified (02/16/2022), Radiculopathy, lumbar region (05/20/2016), and Tachycardia, unspecified (02/16/2022).    Surgical History  She has a past surgical history that includes Tonsillectomy (03/04/2014); Tubal ligation (08/26/2014); and Mouth surgery (08/26/2014).     Social History  She reports that she has never smoked. She has never been exposed to tobacco smoke. She has never used smokeless tobacco. She reports that she does not drink alcohol and does not use drugs.    Family History  Family History   Problem Relation Name Age of Onset    Anxiety disorder Mother Karolyn Terrell     Other (back injury) Mother Karolyn Terrell     Depression Mother Karolyn Terrell         recurrent    Other (cardiac disorder) Father      Heart attack Father      Anxiety disorder Brother Wild     Hypertension Brother Wild     Other (king disease) Brother Wild         Allergies  Bee venom protein (honey bee), Nut - unspecified, Pecan nut, Cephalexin, House dust, Hydrocodone-acetaminophen, Insulin aspart, Insulin regular, Nickel, Red dye, and Tree and shrub pollen    Review of Systems     Physical Exam  Vitals reviewed.   Constitutional:       Comments: Wheelchair present   Cardiovascular:      Rate and Rhythm: Normal rate and regular rhythm.   Pulmonary:      Effort: Pulmonary effort is normal. No respiratory distress.      Breath sounds: Normal breath sounds. No wheezing.   Abdominal:      General: Abdomen is flat. Bowel sounds are normal.      Palpations: Abdomen is soft.   Neurological:      Mental Status: She is alert.          Last Recorded Vitals  /76   Pulse 99   Resp 18   SpO2 100%     Relevant Results    Current Outpatient Medications:     ALPRAZolam (Xanax) 1 mg tablet, Take 1 tablet (1 mg) by mouth 3 times a day as needed for  "anxiety., Disp: , Rfl:     BD Ultra-Fine Patience Pen Needle 32 gauge x 5/32\" needle, 4 times a day., Disp: , Rfl:     blood-glucose sensor device, REPLACE SENSOR EVERY 14 DAYS IN ORDER TO TEST BLOOD SUGARS, Disp: 2 each, Rfl: 11    clindamycin (Cleocin T) 1 % external solution, Apply topically. 1-2 times daily, Disp: , Rfl:     cyclobenzaprine (Flexeril) 10 mg tablet, Take 1 tablet (10 mg) by mouth 2 times a day as needed., Disp: , Rfl:     famotidine (Pepcid) 10 mg tablet, Take by mouth 2 times a day as needed., Disp: , Rfl:     FreeStyle Alvarado reader (FreeStyle Alvarado 2 [place]) misc, Use as instructed. Scan sensor for glucose readings as directed, Disp: 1 each, Rfl: 0    FreeStyle Alvarado sensor system (FreeStyle Alvarado 2 Sensor) kit, For continuous glucose monitoring, change sensor every 14 days, Disp: 6 each, Rfl: 3    insulin glargine-yfgn 100 unit/mL (3 mL) Pen, Inject 35 Units under the skin once daily at bedtime., Disp: 45 mL, Rfl: 3    L norgest/e.estradioL-e.estrad (Seasonique) 0.15 mg-30 mcg (84)/10 mcg (7) tablets,dose pack,3 month tablet, TAKE 1 TABLET BY MOUTH ONCE DAILY. *NEED AUGUST APPT*, Disp: 91 tablet, Rfl: 0    lancets (ONETOUCH DELICA SAFETY LANCET MISC), 3 times a day., Disp: , Rfl:     lansoprazole (Prevacid) 30 mg DR capsule, TAKE 1 CAPSULE (30 MG) BY MOUTH 2 TIMES A DAY., Disp: 180 capsule, Rfl: 0    naloxone (Narcan) 4 mg/0.1 mL nasal spray, Administer 1 spray (4 mg) into affected nostril(s). While pt lay on there back May repeat every 2-3 minutes if needed, alternating nostrils, until medical assistance becomes available., Disp: , Rfl:     OneTouch Delica Plus Lancet 33 gauge misc, , Disp: , Rfl:     pen needle, diabetic 32 gauge x 5/32\" needle, USE FOR INSULIN INJECTIONS FOUR TIMES DAILY AS DIRECTED, Disp: 400 each, Rfl: 3    sulfamethoxazole-trimethoprim (Bactrim DS) 800-160 mg tablet, Take 1 tablet by mouth 2 times a day for 7 days., Disp: 14 tablet, Rfl: 0    Synthroid 100 mcg tablet, Take 1 " tablet (100 mcg) by mouth once daily in the morning. Take before meals., Disp: 90 tablet, Rfl: 3    temazepam (Restoril) 30 mg capsule, Take 1 capsule (30 mg) by mouth as needed at bedtime (insomnia)., Disp: , Rfl:     topiramate (Topamax) 50 mg tablet, 1 a day with 300mg (Patient taking differently: Take 1 tablet (50 mg) by mouth once daily at bedtime. With  3(100mg) caps), Disp: 90 tablet, Rfl: 0    Trokendi  mg capsule,extended release 24hr, TAKE 1 CAPSULE (100 MG) BY MOUTH IN THE MORNING  MG IN THE EVENING  MG BEFORE BEDTIME., Disp: 90 capsule, Rfl: 0    Trokendi XR 50 mg capsule,extended release 24hr, TAKE 1 CAPSULE(50MG) BY MOUTH ONCE DAILY WITH 300MG TROKENDI FOR TOTAL DAILY DOSE OF 350MG, Disp: 90 capsule, Rfl: 0    EPINEPHrine (EpiPen 2-Td) 0.3 mg/0.3 mL injection syringe, Inject 0.3 mL (0.3 mg) into the muscle 1 time if needed for anaphylaxis for up to 2 doses., Disp: 2 each, Rfl: 1    L norgest/e.estradioL-e.estrad (Camrese) 0.15 mg-30 mcg (84)/10 mcg (7) tablets,dose pack,3 month tablet, Take 1 tablet by mouth once daily., Disp: 91 tablet, Rfl: 3    L-norgest/e.estradiol-e.estrad (CAMRESE ORAL), Take 1 tablet by mouth once daily., Disp: , Rfl:     ondansetron ODT (Zofran-ODT) 8 mg disintegrating tablet, DISSOLVE 1 TABLET IN MOUTH EVERY 8 HOURS AS NEEDED, Disp: 42 tablet, Rfl: 0    potassium chloride (Klor-Con) 20 mEq packet, Take 40 mEq by mouth 2 times a day for 7 days., Disp: 28 packet, Rfl: 0    spironolactone (Aldactone) 100 mg tablet, Take 4 tablets (400 mg) by mouth once daily., Disp: , Rfl:           Assessment/Plan   Problem List Items Addressed This Visit             ICD-10-CM    Benign essential hypertension - Primary I10      Discussed maintaining diets such as DASH to help maintain normal Blood pressure. Encouraged regular exercise for a total of 120 min weekly. We will fu labs in 1-3 days. For now there will be no change in medical management. All questions and concerns  were addressed. Pt will follow up in 4-6 months or sooner if needed.            NSTEMI (non-ST elevated myocardial infarction) (CMS/HCC) I21.4     Likely type II MI secondary to illness and dehydration  Patient declined admission and transfer on 11/20/23  We will have her follow-up with cardiology  EKG reviewed         Relevant Medications    EPINEPHrine (EpiPen 2-Td) 0.3 mg/0.3 mL injection syringe    Polycystic ovary syndrome E28.2     BTL  Managing with OCPs           Relevant Medications    L norgest/e.estradioL-e.estrad (Camrese) 0.15 mg-30 mcg (84)/10 mcg (7) tablets,dose pack,3 month tablet    Right lumbar radiculopathy M54.16    Relevant Orders    MR lumbar spine wo IV contrast    Rheumatoid factor (Completed)    JOE with Reflex to JONATHAN (Completed)    C-reactive protein (Completed)    UTI (urinary tract infection) N39.0     Other Visit Diagnoses         Codes    Nausea and vomiting, unspecified vomiting type     R11.2    Likely 2/2 to proctocolitis seen in the ED's CT abdomen and pelvis   continued nausea   refilling zofran  continue prevacid     Relevant Medications    ondansetron ODT (Zofran-ODT) 8 mg disintegrating tablet    Other Relevant Orders    CBC and Auto Differential (Completed)    Comprehensive metabolic panel (Completed)    Electrolyte abnormality     E87.8    noted to have AGATHA with several electrolyte abnormalities   will repeat labs today     Relevant Orders    CBC and Auto Differential (Completed)    Comprehensive metabolic panel (Completed)    Nut allergy     Z91.018    Relevant Medications    EPINEPHrine (EpiPen 2-Td) 0.3 mg/0.3 mL injection syringe    Bee allergy status     Z91.030    Relevant Medications    EPINEPHrine (EpiPen 2-Td) 0.3 mg/0.3 mL injection syringe    Leg numbness     R20.0                   Kaci Saha, DO

## 2023-11-23 DIAGNOSIS — E87.6 HYPOKALEMIA: Primary | ICD-10-CM

## 2023-11-23 LAB — RHEUMATOID FACT SER NEPH-ACNC: <10 IU/ML (ref 0–15)

## 2023-11-23 RX ORDER — POTASSIUM CHLORIDE 1.5 G/1.58G
40 POWDER, FOR SOLUTION ORAL 2 TIMES DAILY
Qty: 28 PACKET | Refills: 0 | Status: SHIPPED | OUTPATIENT
Start: 2023-11-23 | End: 2023-11-30

## 2023-11-23 NOTE — PROGRESS NOTES
Took call from lab due to critical low potassium, left voice mail for patient that I am sending in supplementation and that she needs to have repeat labs done to ensure this level is being re-pleated.

## 2023-11-24 ENCOUNTER — PHARMACY VISIT (OUTPATIENT)
Dept: PHARMACY | Facility: CLINIC | Age: 53
End: 2023-11-24
Payer: COMMERCIAL

## 2023-11-24 LAB — ANA SER QL HEP2 SUBST: NEGATIVE

## 2023-11-24 PROCEDURE — RXMED WILLOW AMBULATORY MEDICATION CHARGE

## 2023-11-28 PROBLEM — L97.529 DIABETIC ULCER OF LEFT FOOT (MULTI): Status: RESOLVED | Noted: 2023-08-31 | Resolved: 2023-11-28

## 2023-11-28 PROBLEM — L97.412: Status: RESOLVED | Noted: 2023-08-31 | Resolved: 2023-11-28

## 2023-11-28 PROBLEM — E11.621 DIABETIC ULCER OF LEFT FOOT (MULTI): Status: RESOLVED | Noted: 2023-08-31 | Resolved: 2023-11-28

## 2023-11-28 PROBLEM — E11.621: Status: RESOLVED | Noted: 2023-08-31 | Resolved: 2023-11-28

## 2023-11-28 PROBLEM — I96 GANGRENE OF TOE OF LEFT FOOT (MULTI): Status: RESOLVED | Noted: 2023-08-31 | Resolved: 2023-11-28

## 2023-11-28 NOTE — ASSESSMENT & PLAN NOTE
Discussed maintaining diets such as DASH to help maintain normal Blood pressure. Encouraged regular exercise for a total of 120 min weekly. We will fu labs in 1-3 days. For now there will be no change in medical management. All questions and concerns were addressed. Pt will follow up in 4-6 months or sooner if needed.

## 2023-11-28 NOTE — ASSESSMENT & PLAN NOTE
Likely type II MI secondary to illness and dehydration  Patient declined admission and transfer on 11/20/23  We will have her follow-up with cardiology  EKG reviewed

## 2023-12-04 ENCOUNTER — HOSPITAL ENCOUNTER (OUTPATIENT)
Dept: CARDIOLOGY | Facility: HOSPITAL | Age: 53
Discharge: HOME | End: 2023-12-04
Payer: COMMERCIAL

## 2023-12-04 PROCEDURE — 93005 ELECTROCARDIOGRAM TRACING: CPT

## 2023-12-05 ENCOUNTER — HOSPITAL ENCOUNTER (OUTPATIENT)
Dept: CARDIOLOGY | Facility: HOSPITAL | Age: 53
Discharge: HOME | End: 2023-12-05
Payer: COMMERCIAL

## 2023-12-05 LAB
ATRIAL RATE: 122 BPM
P AXIS: 78 DEGREES
P OFFSET: 218 MS
P ONSET: 162 MS
PR INTERVAL: 130 MS
Q ONSET: 227 MS
QRS COUNT: 20 BEATS
QRS DURATION: 74 MS
QT INTERVAL: 338 MS
QTC CALCULATION(BAZETT): 481 MS
QTC FREDERICIA: 428 MS
R AXIS: -43 DEGREES
T AXIS: 79 DEGREES
T OFFSET: 396 MS
VENTRICULAR RATE: 122 BPM

## 2023-12-05 PROCEDURE — 93005 ELECTROCARDIOGRAM TRACING: CPT

## 2023-12-06 ENCOUNTER — APPOINTMENT (OUTPATIENT)
Dept: RADIOLOGY | Facility: CLINIC | Age: 53
End: 2023-12-06
Payer: COMMERCIAL

## 2023-12-06 LAB
ATRIAL RATE: 119 BPM
ATRIAL RATE: 123 BPM
P AXIS: 52 DEGREES
P AXIS: 66 DEGREES
P OFFSET: 209 MS
P ONSET: 143 MS
PR INTERVAL: 120 MS
PR INTERVAL: 166 MS
Q ONSET: 226 MS
Q ONSET: 230 MS
QRS COUNT: 20 BEATS
QRS COUNT: 21 BEATS
QRS DURATION: 66 MS
QRS DURATION: 74 MS
QT INTERVAL: 260 MS
QT INTERVAL: 420 MS
QTC CALCULATION(BAZETT): 372 MS
QTC CALCULATION(BAZETT): 590 MS
QTC FREDERICIA: 330 MS
QTC FREDERICIA: 527 MS
R AXIS: 15 DEGREES
R AXIS: 39 DEGREES
T AXIS: 44 DEGREES
T AXIS: 82 DEGREES
T OFFSET: 356 MS
T OFFSET: 440 MS
VENTRICULAR RATE: 119 BPM
VENTRICULAR RATE: 123 BPM

## 2023-12-12 ENCOUNTER — ANCILLARY PROCEDURE (OUTPATIENT)
Dept: RADIOLOGY | Facility: CLINIC | Age: 53
End: 2023-12-12
Payer: COMMERCIAL

## 2023-12-12 DIAGNOSIS — M54.16 RIGHT LUMBAR RADICULOPATHY: ICD-10-CM

## 2023-12-12 PROCEDURE — 72148 MRI LUMBAR SPINE W/O DYE: CPT

## 2023-12-12 PROCEDURE — 72148 MRI LUMBAR SPINE W/O DYE: CPT | Performed by: STUDENT IN AN ORGANIZED HEALTH CARE EDUCATION/TRAINING PROGRAM

## 2023-12-13 DIAGNOSIS — M51.36 BULGING LUMBAR DISC: ICD-10-CM

## 2023-12-13 DIAGNOSIS — M54.16 RIGHT LUMBAR RADICULOPATHY: Primary | ICD-10-CM

## 2023-12-18 ENCOUNTER — APPOINTMENT (OUTPATIENT)
Dept: PRIMARY CARE | Facility: CLINIC | Age: 53
End: 2023-12-18
Payer: COMMERCIAL

## 2023-12-19 ENCOUNTER — APPOINTMENT (OUTPATIENT)
Dept: CARDIOLOGY | Facility: CLINIC | Age: 53
End: 2023-12-19
Payer: COMMERCIAL

## 2023-12-26 PROCEDURE — RXMED WILLOW AMBULATORY MEDICATION CHARGE

## 2024-01-04 ENCOUNTER — TELEMEDICINE (OUTPATIENT)
Dept: PRIMARY CARE | Facility: CLINIC | Age: 54
End: 2024-01-04
Payer: COMMERCIAL

## 2024-01-04 DIAGNOSIS — M51.36 BULGING LUMBAR DISC: ICD-10-CM

## 2024-01-04 DIAGNOSIS — R20.0 LEG NUMBNESS: Primary | ICD-10-CM

## 2024-01-04 DIAGNOSIS — E87.6 HYPOKALEMIA: ICD-10-CM

## 2024-01-04 DIAGNOSIS — M15.9 PRIMARY OSTEOARTHRITIS INVOLVING MULTIPLE JOINTS: ICD-10-CM

## 2024-01-04 DIAGNOSIS — K21.9 GASTROESOPHAGEAL REFLUX DISEASE WITHOUT ESOPHAGITIS: ICD-10-CM

## 2024-01-04 PROCEDURE — 99214 OFFICE O/P EST MOD 30 MIN: CPT | Performed by: STUDENT IN AN ORGANIZED HEALTH CARE EDUCATION/TRAINING PROGRAM

## 2024-01-04 PROCEDURE — RXMED WILLOW AMBULATORY MEDICATION CHARGE

## 2024-01-04 RX ORDER — LANSOPRAZOLE 30 MG/1
30 CAPSULE, DELAYED RELEASE ORAL 2 TIMES DAILY
Qty: 180 CAPSULE | Refills: 1 | Status: SHIPPED | OUTPATIENT
Start: 2024-01-04 | End: 2025-01-03

## 2024-01-04 NOTE — PROGRESS NOTES
Subjective   Patient ID: Azucena Hagan is a 53 y.o. female who presents for No chief complaint on file..  Patient presents to the office today virtually for a 1 month follow-up.  Patient obtained an MRI of the lumbar spine which showed severe degenerative changes especially in the L5-S1 region.  Patient states that since her MRI she has been very limited with motion.  She states that she cannot feel her right leg since the manipulation to be placed on the table.  She continues to have the inability to go up and down steps and is currently ambulating with a wheelchair.  Patient has completed physical therapy previously and did not have a good reaction to opioid therapy.  Patient would like to be evaluated for surgical options at this time.    Nausea/vomiting  Continued symptoms of intermittent nausea which normally occurs when she is in severe pain.  She is able to manage this with peppermint and ti    Diabetes  Blood sugar levels have been elevated ranging from 135-100 in the 80s  Patient states she is eating small meals with small amounts  She is using the jerry 2 at this time due to complications of the jerry 3 being an accurate  She is following with endocrinology in February    NSTEMI  Was likely secondary to dehydration  She will have her follow-up with cardiology on January 19    PMHx: HTN, DSL (statin causes cramping), DJD (numbness of the right leg, was following with Dr. Stokes last visit 10/22, previous steroid injections), DM (A1C 9.5%), Lost significant about of weight using keto (>100) and recent illness, Hypothyroid ( TSH 4.7), Long COVID- SOB (improving over time), PCOS (managing with OCPs),         Assessment/Plan   Lumbar radiculopathy  MRI-disc bulging L4/S1 with moderate to severe diffuse arthritis  Patient has completed physical therapy and has follow-up with pain management  Referral to orthopedic spine due to nature of loss of sensation to the right leg    Electrolyte  abnormality  Hypokalemia to be followed up with a BMP order was placed    Patient to return to care in 1 to 2 months or sooner if needed           Kaci Saha DO 01/04/24 2:08 PM

## 2024-01-08 ENCOUNTER — PHARMACY VISIT (OUTPATIENT)
Dept: PHARMACY | Facility: CLINIC | Age: 54
End: 2024-01-08
Payer: COMMERCIAL

## 2024-01-12 ENCOUNTER — TELEPHONE (OUTPATIENT)
Dept: BEHAVIORAL HEALTH | Facility: CLINIC | Age: 54
End: 2024-01-12
Payer: COMMERCIAL

## 2024-01-12 DIAGNOSIS — F41.9 ANXIETY: ICD-10-CM

## 2024-01-12 RX ORDER — TEMAZEPAM 30 MG/1
30 CAPSULE ORAL NIGHTLY PRN
Qty: 30 CAPSULE | Refills: 0 | Status: SHIPPED | OUTPATIENT
Start: 2024-01-12 | End: 2024-02-06 | Stop reason: SDUPTHER

## 2024-01-12 RX ORDER — ALPRAZOLAM 1 MG/1
1 TABLET ORAL 3 TIMES DAILY PRN
Qty: 90 TABLET | Refills: 0 | Status: SHIPPED | OUTPATIENT
Start: 2024-01-12 | End: 2024-02-06 | Stop reason: SDUPTHER

## 2024-01-17 ENCOUNTER — TELEMEDICINE (OUTPATIENT)
Dept: PAIN MEDICINE | Facility: CLINIC | Age: 54
End: 2024-01-17
Payer: COMMERCIAL

## 2024-01-17 DIAGNOSIS — M54.16 RIGHT LUMBAR RADICULOPATHY: ICD-10-CM

## 2024-01-17 DIAGNOSIS — M51.27 OTHER INTERVERTEBRAL DISC DISPLACEMENT, LUMBOSACRAL REGION: ICD-10-CM

## 2024-01-17 DIAGNOSIS — M51.26 HERNIATED LUMBAR INTERVERTEBRAL DISC: Primary | ICD-10-CM

## 2024-01-17 PROCEDURE — 99214 OFFICE O/P EST MOD 30 MIN: CPT | Performed by: PHYSICAL MEDICINE & REHABILITATION

## 2024-01-17 NOTE — PROGRESS NOTES
Chief complaint  Increasing pain in the back and down the R leg    History  Ms Hagan is back for visit  She is having agg of the pain int eh back and right leg. .adrian   She had MRI and went over that with her  The pain in the back is deep achy worse in the mid back area.  This is associated with tight muscle bands.  This limits the range of motion of the lumbar spine mainly in forward flexion.  The pain in the back is radiating around the side on the lateral aspect of the right thigh going down toward the lateral aspect of the right leg into the lateral malleolus toward the lateral aspect of the foot towards the big toe.    This pain down to the right lower limb is more of a burning tingling sensation.  The pain in the right lower limb worsens with bending forward or with any lifting, it improves with laying on the side and resting.  This is similar to the associated pain in the middle to lower back.  Denied any bowel or bladder incontinence.  With the worst pain there is tendency to catch the toe especially on carpeted area.  This occurs mostly when tired and toward the end of the day.    The skin is intact with no breakdown.  No vesicles.      Pain level without medication is 8/10 , with the medication pain level 7/10. OTC     The pain meds are helping control the pain and improving Activities of Daily living and quality of life and quality of sleep.       Denied any fever or chills. No weight loss and no night sweats. No cough or sputum production. No diarrhea   The constipation has been responding to fibers and over the counter medications.     No bladder and bowel incontinence and no other changes in bladder and bowel. No skin changes.  Reports tiredness and fatigability only if the pain is not controlled.   Denied opioids diversion and abuse and denies alcoholism. Denies overuse of the pain medications.           Physical examination  Awake, alert and oriented for time place and persons   This is a AV  visit    Diagnosis  Problem List Items Addressed This Visit       Herniated lumbar intervertebral disc - Primary    Relevant Orders    Transforaminal    Other intervertebral disc displacement, lumbosacral region    Relevant Orders    Transforaminal    Right lumbar radiculopathy    Relevant Orders    Transforaminal        Plan  Reviewed the pain generators.  Went over the types of pain with neuropathic and nociceptive and different pathologies and therapeutic modalities. Discussed the mechanism of action of interventions from acupuncture, physical therapy , regular exercises, injections, botox, spinal cord stimulation, and role of surgery     Went over pathology of the intervertebral disc displacement and the anatomical relation to the Nerve roots and relation to the radicular symptoms. Went over treatment modalities with conservative treatment including acupuncture   and epidural steroid injection with fluoroscopy guidance and last resort of surgery      DW her about findings on Mri and her clinical presentation. She is in pain and using a wheelchair  PA for R L5 Transforaminal epidural steroid injection      Risks not limited to infection, sepsis, abscess, bleeding , nerve injury, paralysis, and death...   In the past she did well after the ALY and that is why we are repeating     Discussed about NSAIDS and I explained about the opioids sparing effect to allow keeping the opioids dose at minimal effective dose.   I went over the potential side effects of the NSAIDS on the gastrointestinal, renal and cardiovascular systems.      I detailed the side effects from the acetaminophen in the medication and made aware of those. I also explained about the cumulative effects on the organs and mainly the liver.      Follow-up after above or earlier if needed     The level of clinical decision making in this office visit,  is high, given the high risks of complications with the morbidity and mortality due to the fact that  acute and chronic pain may pose a threat to life and bodily function, if under treated, poorly treated, or with failure to maintain adequate treatment and timely medical follow up. Additionally over treatment has its own set of complications including overdosing on the pain medications and also the habit forming potentials with the use of the medications used to treat chronic painful conditions including therapeutic classes classified as dangerous medications. Given the serious and fluctuating nature of pain level and instensity with extensive consideration for whenever pain changes, there is always the risk of prolonged functional impairment requiring close patient monitoring with regular assessments and reassessments and high level medical decision making at every office visit. The amount and complexity of data reviewed is high given the patient clinical presentation, labs,  data, radiology reports, and other tests as discussed during office visits. Pertinent data whether positive or negative were taken in consideration in the process of making this high level medical decision.

## 2024-01-18 PROBLEM — R91.1 LUNG NODULE: Status: ACTIVE | Noted: 2023-06-21

## 2024-01-18 PROBLEM — R63.4 WEIGHT LOSS: Status: RESOLVED | Noted: 2023-08-25 | Resolved: 2024-01-18

## 2024-01-18 PROBLEM — L97.416: Status: RESOLVED | Noted: 2023-08-31 | Resolved: 2024-01-18

## 2024-01-18 PROBLEM — L97.412: Status: RESOLVED | Noted: 2023-08-31 | Resolved: 2024-01-18

## 2024-01-18 PROBLEM — N93.9 ABNORMAL UTERINE BLEEDING (AUB): Status: RESOLVED | Noted: 2023-04-20 | Resolved: 2024-01-18

## 2024-01-19 ENCOUNTER — TELEMEDICINE (OUTPATIENT)
Dept: CARDIOLOGY | Facility: CLINIC | Age: 54
End: 2024-01-19
Payer: COMMERCIAL

## 2024-01-19 DIAGNOSIS — R79.89 TROPONIN LEVEL ELEVATED: ICD-10-CM

## 2024-01-19 DIAGNOSIS — E78.00 HYPERCHOLESTEROLEMIA: ICD-10-CM

## 2024-01-19 DIAGNOSIS — R55 SYNCOPE AND COLLAPSE: Primary | ICD-10-CM

## 2024-01-19 DIAGNOSIS — I10 BENIGN ESSENTIAL HYPERTENSION: ICD-10-CM

## 2024-01-19 DIAGNOSIS — R00.0 TACHYCARDIA: ICD-10-CM

## 2024-01-19 DIAGNOSIS — R06.09 DOE (DYSPNEA ON EXERTION): ICD-10-CM

## 2024-01-19 PROBLEM — I21.4 NSTEMI (NON-ST ELEVATED MYOCARDIAL INFARCTION) (MULTI): Status: RESOLVED | Noted: 2023-11-21 | Resolved: 2024-01-19

## 2024-01-19 PROBLEM — R07.9 CHEST PAIN: Status: RESOLVED | Noted: 2023-08-25 | Resolved: 2024-01-19

## 2024-01-19 PROCEDURE — 99204 OFFICE O/P NEW MOD 45 MIN: CPT | Performed by: INTERNAL MEDICINE

## 2024-01-19 RX ORDER — METOPROLOL SUCCINATE 25 MG/1
25 TABLET, EXTENDED RELEASE ORAL DAILY
Qty: 90 TABLET | Refills: 3 | Status: SHIPPED | OUTPATIENT
Start: 2024-01-19 | End: 2024-02-06 | Stop reason: SINTOL

## 2024-01-19 ASSESSMENT — PAIN SCALES - GENERAL: PAINLEVEL: 7

## 2024-01-19 ASSESSMENT — ENCOUNTER SYMPTOMS
MEMORY LOSS: 0
ALTERED MENTAL STATUS: 0
CHILLS: 0
BLOATING: 0
MYALGIAS: 0
HEMATURIA: 0
HEADACHES: 0
FEVER: 0
HEMOPTYSIS: 0
NAUSEA: 0
VOMITING: 0
WHEEZING: 0
FALLS: 0
DIARRHEA: 0
DYSURIA: 0
DEPRESSION: 0
COUGH: 0
CONSTIPATION: 0
ABDOMINAL PAIN: 0

## 2024-01-19 NOTE — PROGRESS NOTES
Chief complaint:  Syncope/TPN elevation  (Consult requested by C Ray)     Virtual or Telephone Consent    An interactive audio and video telecommunication system which permits real time communications between the patient (at the originating site) and provider (at the distant site) was utilized to provide this telehealth service.   Verbal consent was requested and obtained from Azucena LEXUS Bentley on this date, 01/19/24 for a telehealth visit.      HPI  52 yo WF w/ h/o DM, hypothyroid, long COVID, anxiety now on video for cardiology consult. 11/23 hosp for N/V followed by syncope felt to be due to dehydration (K 2.7,, HSTPN 232). No further episodes.  No chest pain. No dyspnea at rest. +occ ANDERSON. No orthopnea/PND. +occ palps x few minutes (prev improved on Bystolic). No edema. No claudication. No cough.   BP at home: ~110/75, HR ~110  ECG 6/23: ST (122)  ECG 11/23: ST (123), nonsp ST_T changes  ECG 11/23: ST (119), low volt  CXR 11/23: no acute abnl  CT ab 6/23: nl heart size, no peric eff  CT ab 11/23: no AAA    Review of Systems   Constitutional: Negative for chills, fever and malaise/fatigue.   HENT:  Negative for hearing loss.    Eyes:  Negative for visual disturbance.   Respiratory:  Negative for cough, hemoptysis and wheezing.    Skin:  Negative for rash.   Musculoskeletal:  Negative for falls and myalgias.   Gastrointestinal:  Negative for bloating, abdominal pain, constipation, diarrhea, dysphagia, nausea and vomiting.   Genitourinary:  Negative for dysuria and hematuria.   Neurological:  Negative for headaches.   Psychiatric/Behavioral:  Negative for altered mental status, depression and memory loss.         Social History     Tobacco Use    Smoking status: Never     Passive exposure: Never    Smokeless tobacco: Never   Substance Use Topics    Alcohol use: Never        Family History   Problem Relation Name Age of Onset    Anxiety disorder Mother Karolyn Terrell     Other (back injury) Mother Karolyn Terrell      Depression Mother Karolyn Terrell         recurrent    Other (cardiac disorder) Father      Heart attack Father      Anxiety disorder Brother Wild     Hypertension Brother Wild     Other (king disease) Brother Wild         Allergies   Allergen Reactions    Nut - Unspecified Anaphylaxis     Pecans only    Pecan Nut Shortness of breath    Cephalexin Other    House Dust Unknown    Hydrocodone-Acetaminophen Hallucinations and Other     Hallucinations    Insulin Aspart Itching    Insulin Regular Unknown    Nickel Other     Nickel-skin irritation    Red Dye Unknown    Tree And Shrub Pollen Swelling    Venom-Wasp Other      Physical Exam  Constitutional:       Appearance: Normal appearance.   Musculoskeletal:      Right lower leg: No edema.      Left lower leg: No edema.   Neurological:      Mental Status: She is alert.        Lab Results   Component Value Date    CR 1.10      HGB 13.1      K 2.7      MG 1.9     HSTPN 232           LDL      TSH       Assessment/Plan   52 yo WF w/ h/o DM, hypothyroid, long COVID, anxiety now s/p syncope likely due to dehydration from N/V (w/ likely Type 2 demand mild elevation in TPN). Check echo and CT cardiac score (also for ANDERSON and long COVID). Try adding Metoprolol Succinate 25 every day (prev BB helped with tach/palps/anxiety); if intolerant, can try Verapamil. Try to keep euthyroid. JULIUS Griffin MD

## 2024-01-26 ENCOUNTER — PHARMACY VISIT (OUTPATIENT)
Dept: PHARMACY | Facility: CLINIC | Age: 54
End: 2024-01-26
Payer: COMMERCIAL

## 2024-01-26 PROCEDURE — RXMED WILLOW AMBULATORY MEDICATION CHARGE

## 2024-01-31 ENCOUNTER — OFFICE VISIT (OUTPATIENT)
Dept: ORTHOPEDIC SURGERY | Facility: CLINIC | Age: 54
End: 2024-01-31
Payer: COMMERCIAL

## 2024-01-31 ENCOUNTER — HOSPITAL ENCOUNTER (OUTPATIENT)
Dept: RADIOLOGY | Facility: CLINIC | Age: 54
Discharge: HOME | End: 2024-01-31
Payer: COMMERCIAL

## 2024-01-31 VITALS — WEIGHT: 112 LBS | HEIGHT: 62 IN | BODY MASS INDEX: 20.61 KG/M2

## 2024-01-31 DIAGNOSIS — M43.10 ISTHMIC SPONDYLOLISTHESIS: ICD-10-CM

## 2024-01-31 DIAGNOSIS — R20.0 LEG NUMBNESS: ICD-10-CM

## 2024-01-31 DIAGNOSIS — M54.50 LUMBAR PAIN: ICD-10-CM

## 2024-01-31 DIAGNOSIS — M54.16 LUMBAR RADICULOPATHY: Primary | ICD-10-CM

## 2024-01-31 PROCEDURE — 1036F TOBACCO NON-USER: CPT | Performed by: ORTHOPAEDIC SURGERY

## 2024-01-31 PROCEDURE — 99214 OFFICE O/P EST MOD 30 MIN: CPT | Performed by: ORTHOPAEDIC SURGERY

## 2024-01-31 PROCEDURE — 3008F BODY MASS INDEX DOCD: CPT | Performed by: ORTHOPAEDIC SURGERY

## 2024-01-31 PROCEDURE — 72120 X-RAY BEND ONLY L-S SPINE: CPT

## 2024-01-31 ASSESSMENT — PAIN - FUNCTIONAL ASSESSMENT: PAIN_FUNCTIONAL_ASSESSMENT: 0-10

## 2024-01-31 ASSESSMENT — PAIN SCALES - GENERAL: PAINLEVEL_OUTOF10: 7

## 2024-01-31 NOTE — PROGRESS NOTES
Chief Complaint: Low back pain and right leg pain    HPI: Azucena Hagan is a 53 y.o. year old female patient with PMH significant for poorly controlled DM2 (Last HgbA1c 13.8 on 6/20/23) as well as long-term COVID symptoms who was seen in clinic today for chronic low back pain and right radicular leg pain.  She is a patient of Dr. Stokes from pain management.  She has had history of epidural steroid injection which had previously provided some relief.  She is here with her  today.  She states that she has a longstanding history of chronic low back pain for more than 10 years.  She has a strong family history of degenerative changes in the lumbar spine requiring surgery.  She states that her back pain has progressively gotten worse with worsening right leg pain complains of pain that radiates into the right buttock and thigh calf foot.  Otherwise asymptomatic on the contralateral left side.  Denies any bowel or bladder disturbances or saddle anesthesia.  No history of spinal surgery.  She has been on ibuprofen as well as Tylenol as needed for pain.    Patient states that it started to worsen after she was hospitalized for E. coli infection back around December 2023.  She was hospitalized for back and ever since then she has not really ambulated.  She is been sitting in a wheelchair for transportation) and otherwise when she is at home she is either lying in bed or sitting in bed on Sunday she is able to ambulate short distances to the bathroom.  She has not had any outpatient intense physical therapy.      No anticoagulations  No  Tobacco use    Review of Systems    All other systems have been reviewed and are negative for complaint. All pertinent positive and negative as listed in history of present illness.    Past Medical History:   Diagnosis Date    Anxiety     Candidiasis, unspecified 12/27/2017    Yeast infection    Disease of thyroid gland     Dorsalgia, unspecified 04/23/2015    Dorsodynia     Gastro-esophageal reflux disease without esophagitis 04/21/2021    GERD (gastroesophageal reflux disease)    Generalized anxiety disorder 10/05/2022    Generalized anxiety disorder with panic attacks    Hypothyroidism, unspecified 11/17/2022    Hypothyroidism    Ocular pain, left eye 06/06/2021    Left eye pain    Other conditions influencing health status 11/16/2022    Diabetes mellitus type 2, uncontrolled    Otitis media, unspecified, unspecified ear 03/19/2018    Recurrent otitis media    Personal history of other diseases of the circulatory system     History of hypertension    Personal history of other diseases of the respiratory system 01/29/2018    History of acute sinusitis    Personal history of other infectious and parasitic diseases 08/13/2018    History of candidiasis    Personal history of other infectious and parasitic diseases 11/17/2015    History of viral infection    Personal history of other specified conditions     History of nausea    Personal history of other specified conditions 03/12/2019    History of vomiting    Personal history of other specified conditions 07/10/2014    History of diarrhea    Personal history of other specified conditions 08/08/2018    History of headache    Pure hypercholesterolemia, unspecified 02/16/2022    Hypercholesterolemia    Radiculopathy, lumbar region 05/20/2016    Right lumbar radiculopathy    Tachycardia, unspecified 02/16/2022    Tachycardia    Troponin level elevated 01/19/2024        Past Surgical History:   Procedure Laterality Date    MOUTH SURGERY  08/26/2014    Oral Surgery Tooth Extraction    TONSILLECTOMY  03/04/2014    Tonsillectomy With Adenoidectomy    TUBAL LIGATION  08/26/2014    Tubal Ligation        Allergies   Allergen Reactions    Nut - Unspecified Anaphylaxis     Pecans only    Pecan Nut Shortness of breath    Cephalexin Other    House Dust Unknown    Hydrocodone-Acetaminophen Hallucinations and Other     Hallucinations    Insulin Aspart  "Itching    Insulin Regular Unknown    Nickel Other     Nickel-skin irritation    Red Dye Unknown    Tree And Shrub Pollen Swelling    Venom-Wasp Other        Current Outpatient Medications on File Prior to Visit   Medication Sig Dispense Refill    ALPRAZolam (Xanax) 1 mg tablet Take 1 tablet (1 mg) by mouth 3 times a day as needed for anxiety. 90 tablet 0    BD Ultra-Fine Patience Pen Needle 32 gauge x 5/32\" needle 4 times a day.      blood-glucose sensor device REPLACE SENSOR EVERY 14 DAYS IN ORDER TO TEST BLOOD SUGARS 2 each 11    clindamycin (Cleocin T) 1 % external solution Apply topically. 1-2 times daily      cyclobenzaprine (Flexeril) 10 mg tablet Take 1 tablet (10 mg) by mouth 2 times a day as needed.      EPINEPHrine (EpiPen 2-Td) 0.3 mg/0.3 mL injection syringe Inject 0.3 mL (0.3 mg) into the muscle 1 time if needed for anaphylaxis for up to 2 doses. 2 each 1    famotidine (Pepcid) 10 mg tablet Take by mouth 2 times a day as needed.      FreeStyle Alvarado reader (FreeStyle Alvarado 2 [place]) misc Use as instructed. Scan sensor for glucose readings as directed 1 each 0    FreeStyle Alvarado sensor system (FreeStyle Alvarado 2 Sensor) kit For continuous glucose monitoring, change sensor every 14 days 6 each 3    insulin glargine-yfgn 100 unit/mL (3 mL) Pen Inject 35 Units under the skin once daily at bedtime. 45 mL 3    L norgest/e.estradioL-e.estrad (Camrese) 0.15 mg-30 mcg (84)/10 mcg (7) tablets,dose pack,3 month tablet Take 1 tablet by mouth once daily. 91 tablet 3    lancets (ONETOUCH DELICA SAFETY LANCET MISC) 3 times a day.      lansoprazole (Prevacid) 30 mg DR capsule TAKE 1 CAPSULE (30 MG) BY MOUTH 2 TIMES A DAY. 180 capsule 1    metoprolol succinate XL (Toprol-XL) 25 mg 24 hr tablet Take 1 tablet (25 mg) by mouth once daily. Do not crush or chew. 90 tablet 3    naloxone (Narcan) 4 mg/0.1 mL nasal spray Administer 1 spray (4 mg) into affected nostril(s). While pt lay on there back May repeat every 2-3 minutes if " "needed, alternating nostrils, until medical assistance becomes available.      ondansetron ODT (Zofran-ODT) 8 mg disintegrating tablet DISSOLVE 1 TABLET IN MOUTH EVERY 8 HOURS AS NEEDED 42 tablet 0    OneTouch Delica Plus Lancet 33 gauge misc       pen needle, diabetic 32 gauge x 5/32\" needle USE FOR INSULIN INJECTIONS FOUR TIMES DAILY AS DIRECTED 400 each 3    spironolactone (Aldactone) 100 mg tablet Take 4 tablets (400 mg) by mouth once daily.      Synthroid 100 mcg tablet Take 1 tablet (100 mcg) by mouth once daily in the morning. Take before meals. 90 tablet 3    temazepam (Restoril) 30 mg capsule Take 1 capsule (30 mg) by mouth as needed at bedtime (insomnia). 30 capsule 0    Trokendi  mg capsule,extended release 24hr TAKE 1 CAPSULE (100 MG) BY MOUTH IN THE MORNING  MG IN THE EVENING  MG BEFORE BEDTIME. 90 capsule 0    Trokendi XR 50 mg capsule,extended release 24hr TAKE 1 CAPSULE(50MG) BY MOUTH ONCE DAILY WITH 300MG TROKENDI FOR TOTAL DAILY DOSE OF 350MG 90 capsule 0    L norgest/e.estradioL-e.estrad (Seasonique) 0.15 mg-30 mcg (84)/10 mcg (7) tablets,dose pack,3 month tablet TAKE 1 TABLET BY MOUTH ONCE DAILY. *NEED AUGUST APPT* (Patient not taking: Reported on 1/31/2024) 91 tablet 0    L-norgest/e.estradiol-e.estrad (CAMRESE ORAL) Take 1 tablet by mouth once daily.       No current facility-administered medications on file prior to visit.          PE:   General: Patient appears well-nourished and well-developed in no acute distress, Alert and Oriented x3,   Psych: Pleasant mood and affect  HEENT: Extraocular muscles intact, pupils equal and round. Sclerae anicteric   Cardio: extremities warm and well perfused  Resp: unlabored symmetric breathing  Skin: no open wounds or rash  Musculoskeletal/Neuro Exam: Sitting in a wheelchair.  Diffuse tenderness to palpation along the midline lumbar and paraspinal more so on the right side.  Unable to get out of the wheelchair and onto the examining " table.  She does have significant tightness particularly on the right lower extremity with her calf and hamstring.    Lower extremity    Motor: Right leg with 5 out of 5 motor strength with hip flexion, knee extension, 4 out of 5 ankle dorsiflexion secondary to tightness, 5 out of 5 plantarflexion and EHL against resistance.  Left leg with 5 out of 5 motor strength with hip flexion, knee extension, ankle dorsiflexion plantarflexion EHL against resistance  Sensation to light touch intact along L2 to S1 distribution bilaterally          Imaging:    I reviewed the MRI of the lumbar spine from December 12, 2023 which showed degenerative disc disease at L4-L5 L5-S1 with mild disc protrusion without any significant central stenosis.  He has a little bit of mild to moderate foraminal stenosis at L4-L5 L5 and S1 respectively.    I personally reviewed xray of the lumbar spine obtained in clinic today. AP, Lateral, flexion and extension xrays were reviewed.  She does have evidence of chronic L5 bilateral pars fracture.  Mild L5-S1 isthmic spondylolisthesis.  This is chronic because she has x-rays back from 2009 which shows the L5 pars fracture is the spondylolisthesis      A/P: Azucena Hagan is a 53 y.o. year old female patient with complaints of chronic low back pain with right radicular leg pain secondary to L5-S1 isthmic spondylolisthesis without any significant central or foraminal stenosis and also she has been pretty deconditioned after her E. coli infection back in December 2023 and has been pretty sedentary either using a wheelchair or lying in bed or sitting in bed most of the time.  I think this is contributing to her back pain as well as her leg symptoms.  On exam she has a lot of rigidity and tightness in her right lower extremity.  I strongly encouraged her to participate in outpatient physical therapy to work on stretching strengthening of her legs and core and lower back.  I advised her that it will be rough  for the first few sessions however when she is able to get her legs moving and stronger it would feel better.  MRI shows some mild narrowing at L5-S1 foramen nothing significant that would require surgery.  I think that is important now to work on therapy and stretching and strengthening to get her mobilizing again.  She is scheduled to see Dr. Stokes for epidural injection which I think is appropriate.  Patient can follow-up as needed.            Mariya Mcfarlane MD    Department of Orthopaedic Surgery  [facility]  [email or web address]    Initial (On Arrival)

## 2024-02-05 ENCOUNTER — APPOINTMENT (OUTPATIENT)
Dept: ENDOCRINOLOGY | Facility: CLINIC | Age: 54
End: 2024-02-05
Payer: COMMERCIAL

## 2024-02-06 ENCOUNTER — TELEMEDICINE (OUTPATIENT)
Dept: BEHAVIORAL HEALTH | Facility: CLINIC | Age: 54
End: 2024-02-06
Payer: COMMERCIAL

## 2024-02-06 DIAGNOSIS — F41.9 ANXIETY: ICD-10-CM

## 2024-02-06 DIAGNOSIS — F41.0 GENERALIZED ANXIETY DISORDER WITH PANIC ATTACKS: ICD-10-CM

## 2024-02-06 DIAGNOSIS — F41.1 GENERALIZED ANXIETY DISORDER WITH PANIC ATTACKS: ICD-10-CM

## 2024-02-06 PROCEDURE — 99214 OFFICE O/P EST MOD 30 MIN: CPT | Performed by: PSYCHIATRY & NEUROLOGY

## 2024-02-06 PROCEDURE — 3008F BODY MASS INDEX DOCD: CPT | Performed by: PSYCHIATRY & NEUROLOGY

## 2024-02-06 PROCEDURE — 1036F TOBACCO NON-USER: CPT | Performed by: PSYCHIATRY & NEUROLOGY

## 2024-02-06 RX ORDER — TEMAZEPAM 30 MG/1
30 CAPSULE ORAL NIGHTLY PRN
Qty: 30 CAPSULE | Refills: 3 | Status: SHIPPED | OUTPATIENT
Start: 2024-02-06 | End: 2024-06-13

## 2024-02-06 RX ORDER — ALPRAZOLAM 1 MG/1
1 TABLET ORAL 3 TIMES DAILY PRN
Qty: 90 TABLET | Refills: 3 | Status: SHIPPED | OUTPATIENT
Start: 2024-02-06 | End: 2024-06-13

## 2024-02-06 ASSESSMENT — ENCOUNTER SYMPTOMS: PSYCHIATRIC NEGATIVE: 1

## 2024-02-06 NOTE — ASSESSMENT & PLAN NOTE
Continue temazepam 30 mg at night for chronic insomnia.  Continue Xanax 1 mg 3 times daily as needed panic.  Follow-up 3 months

## 2024-02-06 NOTE — PROGRESS NOTES
Subjective   Patient ID: Azucena Hagan is a 53 y.o. female who presents for medication management..  HPI patient seen interval psych history reviewed.  Patient described a litany of medical issues including vomiting, pain, inability to walk recently due to an MRI.  Unable to get the brand of temazepam that works for her therefore wants it called into the [facility] pharmacy.  Not taking Xanax 3 times every day but rather more like 1 or 2 times a day for panic anxiety.    Review of Systems   Psychiatric/Behavioral: Negative.          Life seems defined by frequent medical symptoms and issues.       Objective   Physical Exam  Psychiatric:         Attention and Perception: Attention and perception normal.         Mood and Affect: Mood and affect normal.         Speech: Speech normal.         Behavior: Behavior normal. Behavior is cooperative.         Thought Content: Thought content normal.         Cognition and Memory: Cognition and memory normal.         Judgment: Judgment normal.      Comments: Lying in bed says she cannot walk         Assessment/Plan   Problem List Items Addressed This Visit             ICD-10-CM    Anxiety F41.9    Relevant Medications    ALPRAZolam (Xanax) 1 mg tablet    temazepam (Restoril) 30 mg capsule    Generalized anxiety disorder with panic attacks F41.1, F41.0     Continue temazepam 30 mg at night for chronic insomnia.  Continue Xanax 1 mg 3 times daily as needed panic.  Follow-up 3 months                 James Tavarez MD 02/06/24 6:28 PM

## 2024-02-08 ENCOUNTER — TELEPHONE (OUTPATIENT)
Dept: CARDIOLOGY | Facility: CLINIC | Age: 54
End: 2024-02-08
Payer: COMMERCIAL

## 2024-02-08 DIAGNOSIS — R06.09 DOE (DYSPNEA ON EXERTION): Primary | ICD-10-CM

## 2024-02-08 PROCEDURE — RXMED WILLOW AMBULATORY MEDICATION CHARGE

## 2024-02-09 ENCOUNTER — PHARMACY VISIT (OUTPATIENT)
Dept: PHARMACY | Facility: CLINIC | Age: 54
End: 2024-02-09
Payer: COMMERCIAL

## 2024-02-09 PROCEDURE — RXMED WILLOW AMBULATORY MEDICATION CHARGE

## 2024-02-12 ENCOUNTER — PHARMACY VISIT (OUTPATIENT)
Dept: PHARMACY | Facility: CLINIC | Age: 54
End: 2024-02-12
Payer: COMMERCIAL

## 2024-02-13 ENCOUNTER — TELEPHONE (OUTPATIENT)
Dept: PREOP | Facility: CLINIC | Age: 54
End: 2024-02-13

## 2024-02-14 ENCOUNTER — HOSPITAL ENCOUNTER (OUTPATIENT)
Dept: OPERATING ROOM | Facility: CLINIC | Age: 54
Setting detail: OUTPATIENT SURGERY
Discharge: HOME | End: 2024-02-14
Payer: COMMERCIAL

## 2024-02-14 ENCOUNTER — HOSPITAL ENCOUNTER (OUTPATIENT)
Dept: RADIOLOGY | Facility: CLINIC | Age: 54
Setting detail: OUTPATIENT SURGERY
Discharge: HOME | End: 2024-02-14
Payer: COMMERCIAL

## 2024-02-14 VITALS
OXYGEN SATURATION: 97 % | HEIGHT: 62 IN | WEIGHT: 114 LBS | HEART RATE: 119 BPM | BODY MASS INDEX: 20.98 KG/M2 | TEMPERATURE: 97.9 F | DIASTOLIC BLOOD PRESSURE: 58 MMHG | RESPIRATION RATE: 16 BRPM | SYSTOLIC BLOOD PRESSURE: 118 MMHG

## 2024-02-14 DIAGNOSIS — M54.16 RIGHT LUMBAR RADICULOPATHY: ICD-10-CM

## 2024-02-14 DIAGNOSIS — G89.29 CHRONIC BILATERAL LOW BACK PAIN WITH RIGHT-SIDED SCIATICA: Primary | ICD-10-CM

## 2024-02-14 DIAGNOSIS — M51.27 OTHER INTERVERTEBRAL DISC DISPLACEMENT, LUMBOSACRAL REGION: ICD-10-CM

## 2024-02-14 DIAGNOSIS — M51.26 HERNIATED LUMBAR INTERVERTEBRAL DISC: ICD-10-CM

## 2024-02-14 DIAGNOSIS — M54.41 CHRONIC BILATERAL LOW BACK PAIN WITH RIGHT-SIDED SCIATICA: Primary | ICD-10-CM

## 2024-02-14 PROCEDURE — 64483 NJX AA&/STRD TFRM EPI L/S 1: CPT | Performed by: PHYSICAL MEDICINE & REHABILITATION

## 2024-02-14 PROCEDURE — 2550000001 HC RX 255 CONTRASTS: Performed by: PHYSICAL MEDICINE & REHABILITATION

## 2024-02-14 PROCEDURE — 64483 NJX AA&/STRD TFRM EPI L/S 1: CPT

## 2024-02-14 PROCEDURE — RXMED WILLOW AMBULATORY MEDICATION CHARGE

## 2024-02-14 PROCEDURE — 2500000004 HC RX 250 GENERAL PHARMACY W/ HCPCS (ALT 636 FOR OP/ED): Performed by: PHYSICAL MEDICINE & REHABILITATION

## 2024-02-14 PROCEDURE — 2500000005 HC RX 250 GENERAL PHARMACY W/O HCPCS: Performed by: PHYSICAL MEDICINE & REHABILITATION

## 2024-02-14 RX ORDER — CYCLOBENZAPRINE HCL 10 MG
10 TABLET ORAL 3 TIMES DAILY PRN
Qty: 30 TABLET | Refills: 0 | Status: SHIPPED | OUTPATIENT
Start: 2024-02-14 | End: 2024-04-18 | Stop reason: SDUPTHER

## 2024-02-14 RX ORDER — TRIAMCINOLONE ACETONIDE 40 MG/ML
INJECTION, SUSPENSION INTRA-ARTICULAR; INTRAMUSCULAR AS NEEDED
Status: COMPLETED | OUTPATIENT
Start: 2024-02-14 | End: 2024-02-14

## 2024-02-14 RX ORDER — LIDOCAINE HYDROCHLORIDE 5 MG/ML
INJECTION, SOLUTION INFILTRATION; PERINEURAL AS NEEDED
Status: COMPLETED | OUTPATIENT
Start: 2024-02-14 | End: 2024-02-14

## 2024-02-14 RX ADMIN — LIDOCAINE HYDROCHLORIDE 10 ML: 5 INJECTION, SOLUTION INFILTRATION; PERINEURAL at 08:39

## 2024-02-14 RX ADMIN — TRIAMCINOLONE ACETONIDE 40 MG: 40 INJECTION, SUSPENSION INTRA-ARTICULAR; INTRAMUSCULAR at 08:39

## 2024-02-14 RX ADMIN — IOHEXOL 1 ML: 240 INJECTION, SOLUTION INTRATHECAL; INTRAVASCULAR; INTRAVENOUS; ORAL at 08:39

## 2024-02-14 ASSESSMENT — PAIN - FUNCTIONAL ASSESSMENT
PAIN_FUNCTIONAL_ASSESSMENT: 0-10
PAIN_FUNCTIONAL_ASSESSMENT: 0-10

## 2024-02-14 ASSESSMENT — PAIN SCALES - GENERAL
PAINLEVEL_OUTOF10: 0 - NO PAIN
PAINLEVEL_OUTOF10: 6

## 2024-02-14 ASSESSMENT — COLUMBIA-SUICIDE SEVERITY RATING SCALE - C-SSRS
1. IN THE PAST MONTH, HAVE YOU WISHED YOU WERE DEAD OR WISHED YOU COULD GO TO SLEEP AND NOT WAKE UP?: NO
6. HAVE YOU EVER DONE ANYTHING, STARTED TO DO ANYTHING, OR PREPARED TO DO ANYTHING TO END YOUR LIFE?: NO
2. HAVE YOU ACTUALLY HAD ANY THOUGHTS OF KILLING YOURSELF?: NO

## 2024-02-14 NOTE — INTERVAL H&P NOTE
H&P reviewed. The patient was examined and there are no changes to the H&P.    Right L5 tfe   Denied any fever or chills. No weight loss and no night sweats. No cough or sputum production. No diarrhea   The constipation has been responding to fibers and over the counter medications.     No bladder and bowel incontinence and no other changes in bladder and bowel. No skin changes.  Reports tiredness and fatigability only if the pain is not controlled.   Denied opioids diversion and abuse and denies alcoholism. Denies overuse of the pain medications.

## 2024-02-14 NOTE — OP NOTE
* No procedures listed * Operative Note     Date: 2024  OR Location: [facility] [place] [id number] ENDOSC1 OR    Name: Azucena Hagan, : 1970, Age: 53 y.o., MRN: [id number], Sex: female    Diagnosis  Right L5 radiculitis  Right L5 radiculitis      Procedures  Right L5 Transforaminal epidural steroid injection      Surgeons   Martir Stokes MD     Resident/Fellow/Other Assistant:  * No surgeons found in log *    Procedure Summary  Anesthesia: * No anesthesia type entered *  ASA: ASA status not filed in the log.  Anesthesia Staff: No anesthesia staff entered.  Estimated Blood Loss: 0 mL  Intra-op Medications: * Intraprocedure medication information is unavailable because the case start and end events have not been set *      Intraprocedure I/O Totals       None           Specimen: No specimens collected     Staff:   Circulator: Orin Wright RN; Lacie Conley RN         Drains and/or Catheters: * None in log *    Tourniquet Times:         Implants:     Findings: End plates changes     Indications: Azucena Hagan is an 53 y.o. female who is having Right L5 Transforaminal epidural steroid injection        The patient was seen in the preoperative area. The risks, benefits, complications, treatment options, non-operative alternatives, expected recovery and outcomes were discussed with the patient. The possibilities of reaction to medication, pulmonary aspiration, injury to surrounding structures, bleeding, recurrent infection, the need for additional procedures, failure to diagnose a condition, and creating a complication requiring transfusion or operation were discussed with the patient. The patient concurred with the proposed plan, giving informed consent.  The site of surgery was properly noted/marked if necessary per policy. The patient has been actively warmed in preoperative area. Preoperative antibiotics are not indicated. Venous thrombosis prophylaxis are not indicated.    Procedure Details:   Time-in:  825 am   Time-out:  843 am   Sedation:  none   Indications: Failure to respond to conservative treatment with therapy and medications.    PROCEDURE:    The risks, benefits and alternatives of the procedure were discussed with the patient and agreed to proceed. The risks included but not limited to: infection, bleeding, paralysis, nerve injury sepsis and remotely death were discussed with the patient during the office and again in the pre procedure area.  The patient signed informed consent in the pre procedure area.     The patient was brought to procedure room and time out for the procedure was performed with the procedure room staff present. Patient placed in prone position on the procedure table and draped and covered appropriately.      Fluoroscopy machine was used to identify the right  neuroforamen    Skin prepped and draped in sterile fashion over the lower lumbar spine area.  Skin was infiltrated with local anesthetic with 0.5% lidocaine.  Spinal needle was introduced to the neuroforamen, verified with fluoroscopy.  Adequate flow of contrast dye around the nerve root was verified with fluoroscopy.  At that point, 40 mg Kenalog mixed with 5 mL of normal saline were injected.      felt the tingling down the lower limb during the injection     Procedure tolerated very well.  No complications encountered.  Post procedure care discussed with the patient, agreed to proceed.   Patient instructed on keeping track of the pain level post discharge.   Patient discharged home, from the recovery room, in stable condition.   Complications:  None; patient tolerated the procedure well.    Disposition: PACU - hemodynamically stable.  Condition: stable         Additional Details: End plates changes     Attending Attestation: I performed the procedure.    Martir Stokes MD

## 2024-02-15 ENCOUNTER — PHARMACY VISIT (OUTPATIENT)
Dept: PHARMACY | Facility: CLINIC | Age: 54
End: 2024-02-15
Payer: COMMERCIAL

## 2024-02-29 DIAGNOSIS — G43.909 MIGRAINE WITHOUT STATUS MIGRAINOSUS, NOT INTRACTABLE, UNSPECIFIED MIGRAINE TYPE: ICD-10-CM

## 2024-02-29 DIAGNOSIS — Z30.9 ENCOUNTER FOR CONTRACEPTIVE MANAGEMENT, UNSPECIFIED TYPE: ICD-10-CM

## 2024-02-29 RX ORDER — LEVONORGESTREL / ETHINYL ESTRADIOL AND ETHINYL ESTRADIOL 150-30(84)
KIT ORAL
Qty: 91 TABLET | Refills: 0 | Status: CANCELLED | OUTPATIENT
Start: 2024-02-29 | End: 2025-02-28

## 2024-03-01 DIAGNOSIS — G43.809 OTHER MIGRAINE WITHOUT STATUS MIGRAINOSUS, NOT INTRACTABLE: Primary | ICD-10-CM

## 2024-03-01 DIAGNOSIS — G43.809 OTHER MIGRAINE WITHOUT STATUS MIGRAINOSUS, NOT INTRACTABLE: ICD-10-CM

## 2024-03-01 PROCEDURE — RXMED WILLOW AMBULATORY MEDICATION CHARGE

## 2024-03-01 RX ORDER — TOPIRAMATE 50 MG/1
CAPSULE, EXTENDED RELEASE ORAL
Qty: 90 CAPSULE | Refills: 0 | Status: CANCELLED | OUTPATIENT
Start: 2024-03-01 | End: 2025-03-01

## 2024-03-01 RX ORDER — TOPIRAMATE 100 MG/1
CAPSULE, EXTENDED RELEASE ORAL
Qty: 90 CAPSULE | Refills: 0 | Status: CANCELLED | OUTPATIENT
Start: 2024-03-01 | End: 2025-03-01

## 2024-03-04 DIAGNOSIS — G43.809 OTHER MIGRAINE WITHOUT STATUS MIGRAINOSUS, NOT INTRACTABLE: ICD-10-CM

## 2024-03-04 PROCEDURE — RXMED WILLOW AMBULATORY MEDICATION CHARGE

## 2024-03-04 RX ORDER — TOPIRAMATE 100 MG/1
CAPSULE, EXTENDED RELEASE ORAL
Qty: 90 CAPSULE | Refills: 0 | OUTPATIENT
Start: 2024-03-04 | End: 2025-03-04

## 2024-03-04 RX ORDER — TOPIRAMATE 50 MG/1
CAPSULE, EXTENDED RELEASE ORAL
Qty: 90 CAPSULE | Refills: 0 | Status: SHIPPED | OUTPATIENT
Start: 2024-03-04 | End: 2024-03-11 | Stop reason: SDUPTHER

## 2024-03-04 RX ORDER — TOPIRAMATE 100 MG/1
CAPSULE, EXTENDED RELEASE ORAL
Qty: 90 CAPSULE | Refills: 0 | Status: SHIPPED | OUTPATIENT
Start: 2024-03-04 | End: 2024-03-11 | Stop reason: SDUPTHER

## 2024-03-05 DIAGNOSIS — Z30.9 ENCOUNTER FOR CONTRACEPTIVE MANAGEMENT, UNSPECIFIED TYPE: ICD-10-CM

## 2024-03-05 RX ORDER — LEVONORGESTREL / ETHINYL ESTRADIOL AND ETHINYL ESTRADIOL 150-30(84)
KIT ORAL
Qty: 91 TABLET | Refills: 0 | Status: CANCELLED | OUTPATIENT
Start: 2024-03-05 | End: 2025-03-05

## 2024-03-06 ENCOUNTER — APPOINTMENT (OUTPATIENT)
Dept: CARDIOLOGY | Facility: CLINIC | Age: 54
End: 2024-03-06
Payer: COMMERCIAL

## 2024-03-07 ENCOUNTER — PHARMACY VISIT (OUTPATIENT)
Dept: PHARMACY | Facility: CLINIC | Age: 54
End: 2024-03-07
Payer: COMMERCIAL

## 2024-03-07 PROCEDURE — RXMED WILLOW AMBULATORY MEDICATION CHARGE

## 2024-03-11 ENCOUNTER — PHARMACY VISIT (OUTPATIENT)
Dept: PHARMACY | Facility: CLINIC | Age: 54
End: 2024-03-11
Payer: COMMERCIAL

## 2024-03-11 ENCOUNTER — TELEMEDICINE (OUTPATIENT)
Dept: PRIMARY CARE | Facility: CLINIC | Age: 54
End: 2024-03-11
Payer: COMMERCIAL

## 2024-03-11 DIAGNOSIS — M47.817 FACET DEGENERATION OF LUMBOSACRAL REGION: ICD-10-CM

## 2024-03-11 DIAGNOSIS — G43.809 OTHER MIGRAINE WITHOUT STATUS MIGRAINOSUS, NOT INTRACTABLE: ICD-10-CM

## 2024-03-11 DIAGNOSIS — R20.0 LEG NUMBNESS: Primary | ICD-10-CM

## 2024-03-11 DIAGNOSIS — M51.27 OTHER INTERVERTEBRAL DISC DISPLACEMENT, LUMBOSACRAL REGION: ICD-10-CM

## 2024-03-11 PROCEDURE — 3008F BODY MASS INDEX DOCD: CPT | Performed by: STUDENT IN AN ORGANIZED HEALTH CARE EDUCATION/TRAINING PROGRAM

## 2024-03-11 PROCEDURE — RXMED WILLOW AMBULATORY MEDICATION CHARGE

## 2024-03-11 PROCEDURE — 99214 OFFICE O/P EST MOD 30 MIN: CPT | Performed by: STUDENT IN AN ORGANIZED HEALTH CARE EDUCATION/TRAINING PROGRAM

## 2024-03-11 RX ORDER — TOPIRAMATE 100 MG/1
300 CAPSULE, EXTENDED RELEASE ORAL DAILY
Qty: 270 CAPSULE | Refills: 1 | Status: SHIPPED | OUTPATIENT
Start: 2024-03-11 | End: 2025-03-11

## 2024-03-11 RX ORDER — TOPIRAMATE 50 MG/1
CAPSULE, EXTENDED RELEASE ORAL
Qty: 90 CAPSULE | Refills: 1 | Status: SHIPPED | OUTPATIENT
Start: 2024-03-11 | End: 2025-03-11

## 2024-03-11 NOTE — LETTER
March 13, 2024         Patient: Azucena Hagan   YOB: 1970   Date of Visit: 3/11/2024       To Whom it may Concern,     This letter is to certify that the above patient is under my medical care.  Patient is currently taking Trokendi  mg capsule and Trokendi XR 50 mg capsules for total of 350 mg each day.    Azucena has been diagnosed with chronic daily headaches, intractable chronic migraines, ocular migraines and cervical spondylosis and myelopathy for many years.  She was previously prescribed this current regimen to manage and alleviate her symptoms.  I am pleased to inform you that the prescribed medication has proven to be highly effective in reducing the frequency and severity of migraines since she was started in February 2021.    Over the years, she has tried and failed (ineffective) multiple medications including: OTC-Advil migraine, Excedrin Migraine, midodrine, Motrin, aspirin, acetaminophen and Aleve.  Prescriptions: Naratriptan, Almotriptan, Zecuity, Eletriptan, Frovatriptan, Rizatriptan, Imitrex, Mobic, Zomig-ZMT, Effexor, Elavil, Toprol, Inderal as well as Gabapentin, Cyclobenzaprine, OxyContin, Codeine, Cardizem and Covera.    This is a continuation of therapy and the only medication that finds her relief from these migraines.  To stop or change his medication will be extremely detrimental to her condition.  Please take this into consideration and improving her prior authorization request as soon as possible so she can continue to receive her benefits.    Please call my office with any questions.  I look forward to receiving an approval.      Sincerely,         Kaci Saha, DO

## 2024-03-11 NOTE — PROGRESS NOTES
Subjective   Patient ID: Azucena Hagan is a 53 y.o. female who presents for No chief complaint on file..    Patient presents to the office today virtually for continued lumbar radiculopathy pain.  Patient states that she has had a rough couple of weeks where she was unable to make it into the office today.  Patient was able to follow-up with pain management.    Note patient insurance has denied her 450 mg of Trokendi.  Patient has tried multiple different medications for her migraines and this has shown to be the most useful.  Patient's previous PCP did an appeal for this medication.  We will obtain letter and notes on previous medications dosing and efficacy.  We will resend an appropriate dosing.    Objective   Physical Exam  Vitals reviewed.   Constitutional:       Appearance: Normal appearance.   Cardiovascular:      Rate and Rhythm: Normal rate and regular rhythm.      Heart sounds: No murmur heard.  Pulmonary:      Effort: Pulmonary effort is normal. No respiratory distress.      Breath sounds: Normal breath sounds. No wheezing.   Musculoskeletal:      Cervical back: Neck supple.      Left lower leg: No edema.   Neurological:      Mental Status: She is alert.         Assessment/Plan   Lumbar radiculopathy  MRI-disc bulging L4/S1 with moderate to severe diffuse arthritis  Following with pain management  Referral to Occupational Therapy and physical therapy     Migraines  Refill Trokendi   Will obtain records for previous medications and efficacy for prior auth         Patient to return to care in 1 to 2 months or sooner if needed           Kaci Saha DO 03/31/24 9:03 AM

## 2024-03-19 ENCOUNTER — APPOINTMENT (OUTPATIENT)
Dept: CARDIOLOGY | Facility: CLINIC | Age: 54
End: 2024-03-19
Payer: COMMERCIAL

## 2024-03-20 ENCOUNTER — APPOINTMENT (OUTPATIENT)
Dept: PAIN MEDICINE | Facility: CLINIC | Age: 54
End: 2024-03-20
Payer: COMMERCIAL

## 2024-03-23 ENCOUNTER — APPOINTMENT (OUTPATIENT)
Dept: RADIOLOGY | Facility: HOSPITAL | Age: 54
End: 2024-03-23
Payer: COMMERCIAL

## 2024-03-25 PROCEDURE — RXMED WILLOW AMBULATORY MEDICATION CHARGE

## 2024-03-28 ENCOUNTER — PHARMACY VISIT (OUTPATIENT)
Dept: PHARMACY | Facility: CLINIC | Age: 54
End: 2024-03-28
Payer: COMMERCIAL

## 2024-04-05 ENCOUNTER — APPOINTMENT (OUTPATIENT)
Dept: BEHAVIORAL HEALTH | Facility: CLINIC | Age: 54
End: 2024-04-05
Payer: COMMERCIAL

## 2024-04-06 ENCOUNTER — APPOINTMENT (OUTPATIENT)
Dept: RADIOLOGY | Facility: HOSPITAL | Age: 54
End: 2024-04-06
Payer: COMMERCIAL

## 2024-04-08 PROCEDURE — RXMED WILLOW AMBULATORY MEDICATION CHARGE

## 2024-04-09 ENCOUNTER — PHARMACY VISIT (OUTPATIENT)
Dept: PHARMACY | Facility: CLINIC | Age: 54
End: 2024-04-09
Payer: COMMERCIAL

## 2024-04-18 ENCOUNTER — PHARMACY VISIT (OUTPATIENT)
Dept: PHARMACY | Facility: CLINIC | Age: 54
End: 2024-04-18
Payer: COMMERCIAL

## 2024-04-18 DIAGNOSIS — M54.41 CHRONIC BILATERAL LOW BACK PAIN WITH RIGHT-SIDED SCIATICA: ICD-10-CM

## 2024-04-18 DIAGNOSIS — G89.29 CHRONIC BILATERAL LOW BACK PAIN WITH RIGHT-SIDED SCIATICA: ICD-10-CM

## 2024-04-18 PROCEDURE — RXMED WILLOW AMBULATORY MEDICATION CHARGE

## 2024-04-18 RX ORDER — CYCLOBENZAPRINE HCL 10 MG
10 TABLET ORAL 3 TIMES DAILY PRN
Qty: 30 TABLET | Refills: 2 | Status: SHIPPED | OUTPATIENT
Start: 2024-04-18 | End: 2024-05-24 | Stop reason: HOSPADM

## 2024-04-20 ENCOUNTER — APPOINTMENT (OUTPATIENT)
Dept: RADIOLOGY | Facility: HOSPITAL | Age: 54
End: 2024-04-20
Payer: COMMERCIAL

## 2024-04-23 ENCOUNTER — APPOINTMENT (OUTPATIENT)
Dept: PHARMACY | Facility: HOSPITAL | Age: 54
End: 2024-04-23
Payer: COMMERCIAL

## 2024-05-01 ENCOUNTER — APPOINTMENT (OUTPATIENT)
Dept: PAIN MEDICINE | Facility: CLINIC | Age: 54
End: 2024-05-01
Payer: COMMERCIAL

## 2024-05-04 ENCOUNTER — APPOINTMENT (OUTPATIENT)
Dept: RADIOLOGY | Facility: HOSPITAL | Age: 54
End: 2024-05-04
Payer: COMMERCIAL

## 2024-05-13 PROCEDURE — RXMED WILLOW AMBULATORY MEDICATION CHARGE

## 2024-05-14 ENCOUNTER — PHARMACY VISIT (OUTPATIENT)
Dept: PHARMACY | Facility: CLINIC | Age: 54
End: 2024-05-14
Payer: COMMERCIAL

## 2024-05-18 ENCOUNTER — APPOINTMENT (OUTPATIENT)
Dept: RADIOLOGY | Facility: HOSPITAL | Age: 54
End: 2024-05-18
Payer: COMMERCIAL

## 2024-05-18 ENCOUNTER — HOSPITAL ENCOUNTER (INPATIENT)
Facility: HOSPITAL | Age: 54
LOS: 6 days | Discharge: HOME | DRG: 286 | End: 2024-05-24
Attending: STUDENT IN AN ORGANIZED HEALTH CARE EDUCATION/TRAINING PROGRAM | Admitting: STUDENT IN AN ORGANIZED HEALTH CARE EDUCATION/TRAINING PROGRAM
Payer: COMMERCIAL

## 2024-05-18 ENCOUNTER — HOSPITAL ENCOUNTER (EMERGENCY)
Facility: HOSPITAL | Age: 54
Discharge: OTHER INSTITUTION | End: 2024-05-18
Attending: EMERGENCY MEDICINE
Payer: COMMERCIAL

## 2024-05-18 ENCOUNTER — APPOINTMENT (OUTPATIENT)
Dept: CARDIOLOGY | Facility: HOSPITAL | Age: 54
End: 2024-05-18
Payer: COMMERCIAL

## 2024-05-18 ENCOUNTER — HOSPITAL ENCOUNTER (OUTPATIENT)
Dept: RADIOLOGY | Facility: HOSPITAL | Age: 54
End: 2024-05-18
Payer: COMMERCIAL

## 2024-05-18 ENCOUNTER — APPOINTMENT (OUTPATIENT)
Dept: CARDIOLOGY | Facility: HOSPITAL | Age: 54
DRG: 286 | End: 2024-05-18
Payer: COMMERCIAL

## 2024-05-18 VITALS
RESPIRATION RATE: 19 BRPM | HEIGHT: 62 IN | DIASTOLIC BLOOD PRESSURE: 111 MMHG | WEIGHT: 114 LBS | HEART RATE: 106 BPM | TEMPERATURE: 98.6 F | BODY MASS INDEX: 20.98 KG/M2 | SYSTOLIC BLOOD PRESSURE: 138 MMHG | OXYGEN SATURATION: 96 %

## 2024-05-18 DIAGNOSIS — I50.41 ACUTE COMBINED SYSTOLIC AND DIASTOLIC CONGESTIVE HEART FAILURE (MULTI): ICD-10-CM

## 2024-05-18 DIAGNOSIS — R11.2 NAUSEA AND VOMITING, UNSPECIFIED VOMITING TYPE: ICD-10-CM

## 2024-05-18 DIAGNOSIS — I21.4 NSTEMI (NON-ST ELEVATED MYOCARDIAL INFARCTION) (MULTI): ICD-10-CM

## 2024-05-18 DIAGNOSIS — R10.9 ABDOMINAL PAIN, UNSPECIFIED ABDOMINAL LOCATION: ICD-10-CM

## 2024-05-18 DIAGNOSIS — I51.4 MYOCARDITIS, UNSPECIFIED CHRONICITY, UNSPECIFIED MYOCARDITIS TYPE (MULTI): ICD-10-CM

## 2024-05-18 DIAGNOSIS — J18.9 PNEUMONIA DUE TO INFECTIOUS ORGANISM, UNSPECIFIED LATERALITY, UNSPECIFIED PART OF LUNG: ICD-10-CM

## 2024-05-18 DIAGNOSIS — R06.09 DOE (DYSPNEA ON EXERTION): ICD-10-CM

## 2024-05-18 DIAGNOSIS — Z79.4 TYPE 2 DIABETES MELLITUS WITH HYPERGLYCEMIA, WITH LONG-TERM CURRENT USE OF INSULIN (MULTI): ICD-10-CM

## 2024-05-18 DIAGNOSIS — I21.4 NSTEMI (NON-ST ELEVATED MYOCARDIAL INFARCTION) (MULTI): Primary | ICD-10-CM

## 2024-05-18 DIAGNOSIS — E11.65 POORLY CONTROLLED TYPE 2 DIABETES MELLITUS (MULTI): ICD-10-CM

## 2024-05-18 DIAGNOSIS — R07.9 CHEST PAIN, UNSPECIFIED TYPE: Primary | ICD-10-CM

## 2024-05-18 DIAGNOSIS — E11.65 TYPE 2 DIABETES MELLITUS WITH HYPERGLYCEMIA, WITH LONG-TERM CURRENT USE OF INSULIN (MULTI): ICD-10-CM

## 2024-05-18 DIAGNOSIS — J69.0 ASPIRATION PNEUMONIA DUE TO VOMIT, UNSPECIFIED LATERALITY, UNSPECIFIED PART OF LUNG (MULTI): ICD-10-CM

## 2024-05-18 DIAGNOSIS — B44.9 ASPERGILLUS PNEUMONIA (MULTI): ICD-10-CM

## 2024-05-18 LAB
ALBUMIN SERPL BCP-MCNC: 3.8 G/DL (ref 3.4–5)
ALBUMIN SERPL BCP-MCNC: 4.4 G/DL (ref 3.4–5)
ALP SERPL-CCNC: 77 U/L (ref 33–110)
ALP SERPL-CCNC: 95 U/L (ref 33–110)
ALT SERPL W P-5'-P-CCNC: 66 U/L (ref 7–45)
ALT SERPL W P-5'-P-CCNC: 80 U/L (ref 7–45)
ANION GAP BLDV CALCULATED.4IONS-SCNC: 18 MMOL/L (ref 10–25)
ANION GAP SERPL CALC-SCNC: 23 MMOL/L (ref 10–20)
ANION GAP SERPL CALC-SCNC: 26 MMOL/L (ref 10–20)
APPEARANCE UR: CLEAR
APTT PPP: 20 SECONDS (ref 27–38)
AST SERPL W P-5'-P-CCNC: 103 U/L (ref 9–39)
AST SERPL W P-5'-P-CCNC: 53 U/L (ref 9–39)
BASE EXCESS BLDV CALC-SCNC: 0.7 MMOL/L (ref -2–3)
BASOPHILS # BLD AUTO: 0.02 X10*3/UL (ref 0–0.1)
BASOPHILS NFR BLD AUTO: 0.2 %
BILIRUB SERPL-MCNC: 0.5 MG/DL (ref 0–1.2)
BILIRUB SERPL-MCNC: 0.7 MG/DL (ref 0–1.2)
BILIRUB UR STRIP.AUTO-MCNC: NEGATIVE MG/DL
BODY TEMPERATURE: ABNORMAL
BUN SERPL-MCNC: 24 MG/DL (ref 6–23)
BUN SERPL-MCNC: 27 MG/DL (ref 6–23)
CA-I BLDV-SCNC: 1.18 MMOL/L (ref 1.1–1.33)
CALCIUM SERPL-MCNC: 9 MG/DL (ref 8.6–10.3)
CALCIUM SERPL-MCNC: 9.6 MG/DL (ref 8.6–10.3)
CARDIAC TROPONIN I PNL SERPL HS: 1049 NG/L (ref 0–13)
CARDIAC TROPONIN I PNL SERPL HS: 330 NG/L (ref 0–13)
CARDIAC TROPONIN I PNL SERPL HS: 655 NG/L (ref 0–13)
CHLORIDE BLDV-SCNC: 93 MMOL/L (ref 98–107)
CHLORIDE SERPL-SCNC: 93 MMOL/L (ref 98–107)
CHLORIDE SERPL-SCNC: 96 MMOL/L (ref 98–107)
CO2 SERPL-SCNC: 19 MMOL/L (ref 21–32)
CO2 SERPL-SCNC: 22 MMOL/L (ref 21–32)
COLOR UR: ABNORMAL
CREAT SERPL-MCNC: 0.79 MG/DL (ref 0.5–1.05)
CREAT SERPL-MCNC: 0.83 MG/DL (ref 0.5–1.05)
EGFRCR SERPLBLD CKD-EPI 2021: 85 ML/MIN/1.73M*2
EGFRCR SERPLBLD CKD-EPI 2021: >90 ML/MIN/1.73M*2
EOSINOPHIL # BLD AUTO: 0 X10*3/UL (ref 0–0.7)
EOSINOPHIL NFR BLD AUTO: 0 %
ERYTHROCYTE [DISTWIDTH] IN BLOOD BY AUTOMATED COUNT: 12.4 % (ref 11.5–14.5)
ERYTHROCYTE [DISTWIDTH] IN BLOOD BY AUTOMATED COUNT: 12.6 % (ref 11.5–14.5)
GLUCOSE BLD MANUAL STRIP-MCNC: 261 MG/DL (ref 74–99)
GLUCOSE BLD MANUAL STRIP-MCNC: 267 MG/DL (ref 74–99)
GLUCOSE BLD MANUAL STRIP-MCNC: 301 MG/DL (ref 74–99)
GLUCOSE BLDV-MCNC: 340 MG/DL (ref 74–99)
GLUCOSE SERPL-MCNC: 304 MG/DL (ref 74–99)
GLUCOSE SERPL-MCNC: 317 MG/DL (ref 74–99)
GLUCOSE UR STRIP.AUTO-MCNC: ABNORMAL MG/DL
HCO3 BLDV-SCNC: 25.4 MMOL/L (ref 22–26)
HCT VFR BLD AUTO: 33.3 % (ref 36–46)
HCT VFR BLD AUTO: 37.1 % (ref 36–46)
HCT VFR BLD EST: 36 % (ref 36–46)
HGB BLD-MCNC: 10.7 G/DL (ref 12–16)
HGB BLD-MCNC: 12.4 G/DL (ref 12–16)
HGB BLDV-MCNC: 12.1 G/DL (ref 12–16)
HOLD SPECIMEN: NORMAL
IMM GRANULOCYTES # BLD AUTO: 0.09 X10*3/UL (ref 0–0.7)
IMM GRANULOCYTES NFR BLD AUTO: 0.8 % (ref 0–0.9)
INHALED O2 CONCENTRATION: 21 %
INR PPP: 1.1 (ref 0.9–1.1)
KETONES UR STRIP.AUTO-MCNC: ABNORMAL MG/DL
LACTATE BLDV-SCNC: 2.3 MMOL/L (ref 0.4–2)
LEUKOCYTE ESTERASE UR QL STRIP.AUTO: NEGATIVE
LIPASE SERPL-CCNC: 4 U/L (ref 9–82)
LYMPHOCYTES # BLD AUTO: 1.05 X10*3/UL (ref 1.2–4.8)
LYMPHOCYTES NFR BLD AUTO: 9.7 %
MAGNESIUM SERPL-MCNC: 1.9 MG/DL (ref 1.6–2.4)
MCH RBC QN AUTO: 29.8 PG (ref 26–34)
MCH RBC QN AUTO: 30.1 PG (ref 26–34)
MCHC RBC AUTO-ENTMCNC: 32.1 G/DL (ref 32–36)
MCHC RBC AUTO-ENTMCNC: 33.4 G/DL (ref 32–36)
MCV RBC AUTO: 90 FL (ref 80–100)
MCV RBC AUTO: 93 FL (ref 80–100)
MONOCYTES # BLD AUTO: 0.36 X10*3/UL (ref 0.1–1)
MONOCYTES NFR BLD AUTO: 3.3 %
MUCOUS THREADS #/AREA URNS AUTO: NORMAL /LPF
NEUTROPHILS # BLD AUTO: 9.32 X10*3/UL (ref 1.2–7.7)
NEUTROPHILS NFR BLD AUTO: 86 %
NITRITE UR QL STRIP.AUTO: NEGATIVE
NRBC BLD-RTO: 0 /100 WBCS (ref 0–0)
NRBC BLD-RTO: 0 /100 WBCS (ref 0–0)
OXYHGB MFR BLDV: 53.6 % (ref 45–75)
PCO2 BLDV: 40 MM HG (ref 41–51)
PH BLDV: 7.41 PH (ref 7.33–7.43)
PH UR STRIP.AUTO: 6 [PH]
PLATELET # BLD AUTO: 292 X10*3/UL (ref 150–450)
PLATELET # BLD AUTO: 326 X10*3/UL (ref 150–450)
PO2 BLDV: 35 MM HG (ref 35–45)
POTASSIUM BLDV-SCNC: 3.7 MMOL/L (ref 3.5–5.3)
POTASSIUM SERPL-SCNC: 3.6 MMOL/L (ref 3.5–5.3)
POTASSIUM SERPL-SCNC: 4.2 MMOL/L (ref 3.5–5.3)
PROT SERPL-MCNC: 7.1 G/DL (ref 6.4–8.2)
PROT SERPL-MCNC: 7.9 G/DL (ref 6.4–8.2)
PROT UR STRIP.AUTO-MCNC: ABNORMAL MG/DL
PROTHROMBIN TIME: 12.5 SECONDS (ref 9.8–12.8)
RBC # BLD AUTO: 3.59 X10*6/UL (ref 4–5.2)
RBC # BLD AUTO: 4.12 X10*6/UL (ref 4–5.2)
RBC # UR STRIP.AUTO: ABNORMAL /UL
RBC #/AREA URNS AUTO: NORMAL /HPF
SAO2 % BLDV: 54 % (ref 45–75)
SODIUM BLDV-SCNC: 133 MMOL/L (ref 136–145)
SODIUM SERPL-SCNC: 134 MMOL/L (ref 136–145)
SODIUM SERPL-SCNC: 137 MMOL/L (ref 136–145)
SP GR UR STRIP.AUTO: 1.03
SQUAMOUS #/AREA URNS AUTO: NORMAL /HPF
TRANS CELLS #/AREA UR COMP ASSIST: NORMAL /HPF
UFH PPP CHRO-ACNC: 0.5 IU/ML
UFH PPP CHRO-ACNC: 0.6 IU/ML
UROBILINOGEN UR STRIP.AUTO-MCNC: NORMAL MG/DL
WBC # BLD AUTO: 10.8 X10*3/UL (ref 4.4–11.3)
WBC # BLD AUTO: 9.7 X10*3/UL (ref 4.4–11.3)
WBC #/AREA URNS AUTO: NORMAL /HPF

## 2024-05-18 PROCEDURE — 85027 COMPLETE CBC AUTOMATED: CPT | Performed by: STUDENT IN AN ORGANIZED HEALTH CARE EDUCATION/TRAINING PROGRAM

## 2024-05-18 PROCEDURE — 2500000005 HC RX 250 GENERAL PHARMACY W/O HCPCS: Performed by: STUDENT IN AN ORGANIZED HEALTH CARE EDUCATION/TRAINING PROGRAM

## 2024-05-18 PROCEDURE — 93005 ELECTROCARDIOGRAM TRACING: CPT

## 2024-05-18 PROCEDURE — 85520 HEPARIN ASSAY: CPT | Performed by: INTERNAL MEDICINE

## 2024-05-18 PROCEDURE — 71045 X-RAY EXAM CHEST 1 VIEW: CPT

## 2024-05-18 PROCEDURE — 84145 PROCALCITONIN (PCT): CPT | Mod: GEALAB | Performed by: STUDENT IN AN ORGANIZED HEALTH CARE EDUCATION/TRAINING PROGRAM

## 2024-05-18 PROCEDURE — 99223 1ST HOSP IP/OBS HIGH 75: CPT | Performed by: STUDENT IN AN ORGANIZED HEALTH CARE EDUCATION/TRAINING PROGRAM

## 2024-05-18 PROCEDURE — 84484 ASSAY OF TROPONIN QUANT: CPT | Performed by: STUDENT IN AN ORGANIZED HEALTH CARE EDUCATION/TRAINING PROGRAM

## 2024-05-18 PROCEDURE — 84484 ASSAY OF TROPONIN QUANT: CPT | Performed by: EMERGENCY MEDICINE

## 2024-05-18 PROCEDURE — 82010 KETONE BODYS QUAN: CPT | Mod: GEALAB | Performed by: STUDENT IN AN ORGANIZED HEALTH CARE EDUCATION/TRAINING PROGRAM

## 2024-05-18 PROCEDURE — 74177 CT ABD & PELVIS W/CONTRAST: CPT | Performed by: RADIOLOGY

## 2024-05-18 PROCEDURE — 2500000004 HC RX 250 GENERAL PHARMACY W/ HCPCS (ALT 636 FOR OP/ED)

## 2024-05-18 PROCEDURE — 96361 HYDRATE IV INFUSION ADD-ON: CPT

## 2024-05-18 PROCEDURE — 2060000001 HC INTERMEDIATE ICU ROOM DAILY

## 2024-05-18 PROCEDURE — 82947 ASSAY GLUCOSE BLOOD QUANT: CPT

## 2024-05-18 PROCEDURE — 85610 PROTHROMBIN TIME: CPT | Performed by: STUDENT IN AN ORGANIZED HEALTH CARE EDUCATION/TRAINING PROGRAM

## 2024-05-18 PROCEDURE — 2500000002 HC RX 250 W HCPCS SELF ADMINISTERED DRUGS (ALT 637 FOR MEDICARE OP, ALT 636 FOR OP/ED): Performed by: INTERNAL MEDICINE

## 2024-05-18 PROCEDURE — 36415 COLL VENOUS BLD VENIPUNCTURE: CPT | Performed by: EMERGENCY MEDICINE

## 2024-05-18 PROCEDURE — 96366 THER/PROPH/DIAG IV INF ADDON: CPT

## 2024-05-18 PROCEDURE — 85730 THROMBOPLASTIN TIME PARTIAL: CPT | Performed by: STUDENT IN AN ORGANIZED HEALTH CARE EDUCATION/TRAINING PROGRAM

## 2024-05-18 PROCEDURE — 71045 X-RAY EXAM CHEST 1 VIEW: CPT | Performed by: STUDENT IN AN ORGANIZED HEALTH CARE EDUCATION/TRAINING PROGRAM

## 2024-05-18 PROCEDURE — 2500000001 HC RX 250 WO HCPCS SELF ADMINISTERED DRUGS (ALT 637 FOR MEDICARE OP): Performed by: STUDENT IN AN ORGANIZED HEALTH CARE EDUCATION/TRAINING PROGRAM

## 2024-05-18 PROCEDURE — 82435 ASSAY OF BLOOD CHLORIDE: CPT | Performed by: STUDENT IN AN ORGANIZED HEALTH CARE EDUCATION/TRAINING PROGRAM

## 2024-05-18 PROCEDURE — 2500000002 HC RX 250 W HCPCS SELF ADMINISTERED DRUGS (ALT 637 FOR MEDICARE OP, ALT 636 FOR OP/ED): Performed by: STUDENT IN AN ORGANIZED HEALTH CARE EDUCATION/TRAINING PROGRAM

## 2024-05-18 PROCEDURE — 84132 ASSAY OF SERUM POTASSIUM: CPT | Performed by: STUDENT IN AN ORGANIZED HEALTH CARE EDUCATION/TRAINING PROGRAM

## 2024-05-18 PROCEDURE — 96365 THER/PROPH/DIAG IV INF INIT: CPT

## 2024-05-18 PROCEDURE — 83735 ASSAY OF MAGNESIUM: CPT | Performed by: EMERGENCY MEDICINE

## 2024-05-18 PROCEDURE — 70450 CT HEAD/BRAIN W/O DYE: CPT | Performed by: RADIOLOGY

## 2024-05-18 PROCEDURE — 74177 CT ABD & PELVIS W/CONTRAST: CPT

## 2024-05-18 PROCEDURE — 71275 CT ANGIOGRAPHY CHEST: CPT

## 2024-05-18 PROCEDURE — 96374 THER/PROPH/DIAG INJ IV PUSH: CPT

## 2024-05-18 PROCEDURE — 2500000004 HC RX 250 GENERAL PHARMACY W/ HCPCS (ALT 636 FOR OP/ED): Performed by: STUDENT IN AN ORGANIZED HEALTH CARE EDUCATION/TRAINING PROGRAM

## 2024-05-18 PROCEDURE — 2500000004 HC RX 250 GENERAL PHARMACY W/ HCPCS (ALT 636 FOR OP/ED): Performed by: EMERGENCY MEDICINE

## 2024-05-18 PROCEDURE — 81001 URINALYSIS AUTO W/SCOPE: CPT | Performed by: EMERGENCY MEDICINE

## 2024-05-18 PROCEDURE — 80053 COMPREHEN METABOLIC PANEL: CPT | Performed by: EMERGENCY MEDICINE

## 2024-05-18 PROCEDURE — 85520 HEPARIN ASSAY: CPT | Performed by: STUDENT IN AN ORGANIZED HEALTH CARE EDUCATION/TRAINING PROGRAM

## 2024-05-18 PROCEDURE — 85025 COMPLETE CBC W/AUTO DIFF WBC: CPT | Performed by: EMERGENCY MEDICINE

## 2024-05-18 PROCEDURE — 83690 ASSAY OF LIPASE: CPT | Performed by: EMERGENCY MEDICINE

## 2024-05-18 PROCEDURE — 70450 CT HEAD/BRAIN W/O DYE: CPT

## 2024-05-18 PROCEDURE — 99285 EMERGENCY DEPT VISIT HI MDM: CPT | Mod: 25

## 2024-05-18 PROCEDURE — 2500000001 HC RX 250 WO HCPCS SELF ADMINISTERED DRUGS (ALT 637 FOR MEDICARE OP): Performed by: EMERGENCY MEDICINE

## 2024-05-18 PROCEDURE — 71275 CT ANGIOGRAPHY CHEST: CPT | Performed by: RADIOLOGY

## 2024-05-18 PROCEDURE — 2550000001 HC RX 255 CONTRASTS: Performed by: EMERGENCY MEDICINE

## 2024-05-18 RX ORDER — TOPIRAMATE 25 MG/1
200 TABLET ORAL DAILY
Status: DISCONTINUED | OUTPATIENT
Start: 2024-05-18 | End: 2024-05-21

## 2024-05-18 RX ORDER — HEPARIN SODIUM 10000 [USP'U]/100ML
0-4000 INJECTION, SOLUTION INTRAVENOUS CONTINUOUS
Status: DISCONTINUED | OUTPATIENT
Start: 2024-05-18 | End: 2024-05-18 | Stop reason: HOSPADM

## 2024-05-18 RX ORDER — TEMAZEPAM 15 MG/1
30 CAPSULE ORAL NIGHTLY PRN
Status: DISCONTINUED | OUTPATIENT
Start: 2024-05-18 | End: 2024-05-24 | Stop reason: HOSPADM

## 2024-05-18 RX ORDER — INSULIN LISPRO 100 [IU]/ML
0-15 INJECTION, SOLUTION INTRAVENOUS; SUBCUTANEOUS EVERY 4 HOURS
Status: DISCONTINUED | OUTPATIENT
Start: 2024-05-18 | End: 2024-05-19

## 2024-05-18 RX ORDER — HEPARIN SODIUM 10000 [USP'U]/100ML
INJECTION, SOLUTION INTRAVENOUS
Status: COMPLETED
Start: 2024-05-18 | End: 2024-05-18

## 2024-05-18 RX ORDER — DEXTROSE 50 % IN WATER (D50W) INTRAVENOUS SYRINGE
25
Status: DISCONTINUED | OUTPATIENT
Start: 2024-05-18 | End: 2024-05-19

## 2024-05-18 RX ORDER — NAPROXEN SODIUM 220 MG/1
81 TABLET, FILM COATED ORAL DAILY
Status: DISCONTINUED | OUTPATIENT
Start: 2024-05-18 | End: 2024-05-21

## 2024-05-18 RX ORDER — INSULIN LISPRO 100 [IU]/ML
0-15 INJECTION, SOLUTION INTRAVENOUS; SUBCUTANEOUS
Status: DISCONTINUED | OUTPATIENT
Start: 2024-05-18 | End: 2024-05-18

## 2024-05-18 RX ORDER — LEVOTHYROXINE SODIUM 100 UG/1
100 TABLET ORAL
Status: DISCONTINUED | OUTPATIENT
Start: 2024-05-19 | End: 2024-05-24 | Stop reason: HOSPADM

## 2024-05-18 RX ORDER — FAMOTIDINE 20 MG/1
20 TABLET, FILM COATED ORAL ONCE
Status: COMPLETED | OUTPATIENT
Start: 2024-05-18 | End: 2024-05-18

## 2024-05-18 RX ORDER — METOPROLOL TARTRATE 25 MG/1
25 TABLET, FILM COATED ORAL 2 TIMES DAILY
Status: DISCONTINUED | OUTPATIENT
Start: 2024-05-18 | End: 2024-05-21

## 2024-05-18 RX ORDER — DEXTROSE 50 % IN WATER (D50W) INTRAVENOUS SYRINGE
12.5
Status: DISCONTINUED | OUTPATIENT
Start: 2024-05-18 | End: 2024-05-24 | Stop reason: HOSPADM

## 2024-05-18 RX ORDER — ALPRAZOLAM 0.25 MG/1
1 TABLET ORAL ONCE
Status: COMPLETED | OUTPATIENT
Start: 2024-05-18 | End: 2024-05-18

## 2024-05-18 RX ORDER — HEPARIN SODIUM 10000 [USP'U]/100ML
0-4000 INJECTION, SOLUTION INTRAVENOUS CONTINUOUS
Status: DISCONTINUED | OUTPATIENT
Start: 2024-05-18 | End: 2024-05-21

## 2024-05-18 RX ORDER — SODIUM CHLORIDE 9 MG/ML
100 INJECTION, SOLUTION INTRAVENOUS CONTINUOUS
Status: DISCONTINUED | OUTPATIENT
Start: 2024-05-18 | End: 2024-05-21

## 2024-05-18 RX ORDER — ALPRAZOLAM 0.5 MG/1
1 TABLET ORAL 3 TIMES DAILY PRN
Status: DISCONTINUED | OUTPATIENT
Start: 2024-05-18 | End: 2024-05-24 | Stop reason: HOSPADM

## 2024-05-18 RX ORDER — INSULIN GLARGINE 100 [IU]/ML
35 INJECTION, SOLUTION SUBCUTANEOUS EVERY EVENING
Status: DISCONTINUED | OUTPATIENT
Start: 2024-05-18 | End: 2024-05-20

## 2024-05-18 RX ORDER — CEFTRIAXONE 2 G/50ML
2 INJECTION, SOLUTION INTRAVENOUS EVERY 24 HOURS
Status: DISCONTINUED | OUTPATIENT
Start: 2024-05-18 | End: 2024-05-18 | Stop reason: HOSPADM

## 2024-05-18 RX ORDER — METOPROLOL TARTRATE 1 MG/ML
5 INJECTION, SOLUTION INTRAVENOUS ONCE
Status: COMPLETED | OUTPATIENT
Start: 2024-05-18 | End: 2024-05-18

## 2024-05-18 RX ORDER — ATORVASTATIN CALCIUM 40 MG/1
40 TABLET, FILM COATED ORAL NIGHTLY
Status: DISCONTINUED | OUTPATIENT
Start: 2024-05-18 | End: 2024-05-21

## 2024-05-18 RX ORDER — CYCLOBENZAPRINE HCL 10 MG
10 TABLET ORAL 3 TIMES DAILY PRN
Status: DISCONTINUED | OUTPATIENT
Start: 2024-05-18 | End: 2024-05-24 | Stop reason: HOSPADM

## 2024-05-18 RX ORDER — METOCLOPRAMIDE HYDROCHLORIDE 5 MG/ML
10 INJECTION INTRAMUSCULAR; INTRAVENOUS EVERY 6 HOURS PRN
Status: DISCONTINUED | OUTPATIENT
Start: 2024-05-18 | End: 2024-05-24 | Stop reason: HOSPADM

## 2024-05-18 RX ORDER — TOPIRAMATE 100 MG/1
150 TABLET, FILM COATED ORAL NIGHTLY
Status: DISCONTINUED | OUTPATIENT
Start: 2024-05-18 | End: 2024-05-21

## 2024-05-18 RX ORDER — NAPROXEN SODIUM 220 MG/1
324 TABLET, FILM COATED ORAL ONCE
Status: COMPLETED | OUTPATIENT
Start: 2024-05-18 | End: 2024-05-18

## 2024-05-18 RX ORDER — INSULIN GLARGINE 100 [IU]/ML
35 INJECTION, SOLUTION SUBCUTANEOUS EVERY EVENING
Status: DISCONTINUED | OUTPATIENT
Start: 2024-05-18 | End: 2024-05-18

## 2024-05-18 RX ADMIN — METOCLOPRAMIDE 10 MG: 5 INJECTION, SOLUTION INTRAMUSCULAR; INTRAVENOUS at 15:39

## 2024-05-18 RX ADMIN — INSULIN LISPRO 12 UNITS: 100 INJECTION, SOLUTION INTRAVENOUS; SUBCUTANEOUS at 17:36

## 2024-05-18 RX ADMIN — HEPARIN SODIUM 12 UNITS/HR: 10000 INJECTION, SOLUTION INTRAVENOUS at 10:20

## 2024-05-18 RX ADMIN — PIPERACILLIN SODIUM AND TAZOBACTAM SODIUM 3.38 G: 3; .375 INJECTION, SOLUTION INTRAVENOUS at 16:47

## 2024-05-18 RX ADMIN — INSULIN GLARGINE 35 UNITS: 100 INJECTION, SOLUTION SUBCUTANEOUS at 21:08

## 2024-05-18 RX ADMIN — PROMETHAZINE HYDROCHLORIDE 25 MG: 25 INJECTION INTRAMUSCULAR; INTRAVENOUS at 06:46

## 2024-05-18 RX ADMIN — SODIUM CHLORIDE 75 ML/HR: 9 INJECTION, SOLUTION INTRAVENOUS at 15:41

## 2024-05-18 RX ADMIN — INSULIN LISPRO 8 UNITS: 100 INJECTION, SOLUTION INTRAVENOUS; SUBCUTANEOUS at 21:08

## 2024-05-18 RX ADMIN — FAMOTIDINE 20 MG: 20 TABLET, FILM COATED ORAL at 06:45

## 2024-05-18 RX ADMIN — IOHEXOL 75 ML: 350 INJECTION, SOLUTION INTRAVENOUS at 07:33

## 2024-05-18 RX ADMIN — ALPRAZOLAM 1 MG: 0.25 TABLET ORAL at 08:55

## 2024-05-18 RX ADMIN — METOPROLOL TARTRATE 5 MG: 5 INJECTION INTRAVENOUS at 15:40

## 2024-05-18 RX ADMIN — ASPIRIN 81 MG CHEWABLE TABLET 324 MG: 81 TABLET CHEWABLE at 08:10

## 2024-05-18 RX ADMIN — METOPROLOL TARTRATE 25 MG: 25 TABLET, FILM COATED ORAL at 21:07

## 2024-05-18 RX ADMIN — SODIUM CHLORIDE 1000 ML: 9 INJECTION, SOLUTION INTRAVENOUS at 06:38

## 2024-05-18 RX ADMIN — PIPERACILLIN SODIUM AND TAZOBACTAM SODIUM 3.38 G: 3; .375 INJECTION, SOLUTION INTRAVENOUS at 22:09

## 2024-05-18 SDOH — HEALTH STABILITY: PHYSICAL HEALTH: ON AVERAGE, HOW MANY MINUTES DO YOU ENGAGE IN EXERCISE AT THIS LEVEL?: PATIENT UNABLE TO ANSWER

## 2024-05-18 SDOH — ECONOMIC STABILITY: HOUSING INSECURITY: IN THE LAST 12 MONTHS, HOW MANY PLACES HAVE YOU LIVED?: 1

## 2024-05-18 SDOH — SOCIAL STABILITY: SOCIAL INSECURITY: ARE THERE ANY APPARENT SIGNS OF INJURIES/BEHAVIORS THAT COULD BE RELATED TO ABUSE/NEGLECT?: NO

## 2024-05-18 SDOH — HEALTH STABILITY: MENTAL HEALTH
STRESS IS WHEN SOMEONE FEELS TENSE, NERVOUS, ANXIOUS, OR CAN'T SLEEP AT NIGHT BECAUSE THEIR MIND IS TROUBLED. HOW STRESSED ARE YOU?: TO SOME EXTENT

## 2024-05-18 SDOH — SOCIAL STABILITY: SOCIAL INSECURITY: HAVE YOU HAD THOUGHTS OF HARMING ANYONE ELSE?: NO

## 2024-05-18 SDOH — SOCIAL STABILITY: SOCIAL INSECURITY: DOES ANYONE TRY TO KEEP YOU FROM HAVING/CONTACTING OTHER FRIENDS OR DOING THINGS OUTSIDE YOUR HOME?: NO

## 2024-05-18 SDOH — ECONOMIC STABILITY: FOOD INSECURITY: WITHIN THE PAST 12 MONTHS, THE FOOD YOU BOUGHT JUST DIDN'T LAST AND YOU DIDN'T HAVE MONEY TO GET MORE.: NEVER TRUE

## 2024-05-18 SDOH — ECONOMIC STABILITY: INCOME INSECURITY: IN THE LAST 12 MONTHS, WAS THERE A TIME WHEN YOU WERE NOT ABLE TO PAY THE MORTGAGE OR RENT ON TIME?: NO

## 2024-05-18 SDOH — SOCIAL STABILITY: SOCIAL INSECURITY: HAS ANYONE EVER THREATENED TO HURT YOUR FAMILY OR YOUR PETS?: NO

## 2024-05-18 SDOH — SOCIAL STABILITY: SOCIAL INSECURITY: DO YOU FEEL ANYONE HAS EXPLOITED OR TAKEN ADVANTAGE OF YOU FINANCIALLY OR OF YOUR PERSONAL PROPERTY?: NO

## 2024-05-18 SDOH — ECONOMIC STABILITY: FOOD INSECURITY: WITHIN THE PAST 12 MONTHS, YOU WORRIED THAT YOUR FOOD WOULD RUN OUT BEFORE YOU GOT MONEY TO BUY MORE.: NEVER TRUE

## 2024-05-18 SDOH — SOCIAL STABILITY: SOCIAL INSECURITY: WERE YOU ABLE TO COMPLETE ALL THE BEHAVIORAL HEALTH SCREENINGS?: NO

## 2024-05-18 SDOH — SOCIAL STABILITY: SOCIAL INSECURITY: HAVE YOU HAD ANY THOUGHTS OF HARMING ANYONE ELSE?: NO

## 2024-05-18 SDOH — SOCIAL STABILITY: SOCIAL INSECURITY: ABUSE: ADULT

## 2024-05-18 SDOH — SOCIAL STABILITY: SOCIAL INSECURITY: ARE YOU OR HAVE YOU BEEN THREATENED OR ABUSED PHYSICALLY, EMOTIONALLY, OR SEXUALLY BY ANYONE?: NO

## 2024-05-18 SDOH — SOCIAL STABILITY: SOCIAL INSECURITY: DO YOU FEEL UNSAFE GOING BACK TO THE PLACE WHERE YOU ARE LIVING?: NO

## 2024-05-18 SDOH — HEALTH STABILITY: PHYSICAL HEALTH
ON AVERAGE, HOW MANY DAYS PER WEEK DO YOU ENGAGE IN MODERATE TO STRENUOUS EXERCISE (LIKE A BRISK WALK)?: PATIENT UNABLE TO ANSWER

## 2024-05-18 SDOH — ECONOMIC STABILITY: TRANSPORTATION INSECURITY
IN THE PAST 12 MONTHS, HAS LACK OF TRANSPORTATION KEPT YOU FROM MEETINGS, WORK, OR FROM GETTING THINGS NEEDED FOR DAILY LIVING?: NO

## 2024-05-18 ASSESSMENT — LIFESTYLE VARIABLES
HOW MANY STANDARD DRINKS CONTAINING ALCOHOL DO YOU HAVE ON A TYPICAL DAY: PATIENT DOES NOT DRINK
SKIP TO QUESTIONS 9-10: 1
AUDIT-C TOTAL SCORE: 0
SUBSTANCE_ABUSE_PAST_12_MONTHS: NO
HOW OFTEN DO YOU HAVE A DRINK CONTAINING ALCOHOL: NEVER
PRESCIPTION_ABUSE_PAST_12_MONTHS: NO
HOW OFTEN DO YOU HAVE 6 OR MORE DRINKS ON ONE OCCASION: NEVER
AUDIT-C TOTAL SCORE: 0

## 2024-05-18 ASSESSMENT — ENCOUNTER SYMPTOMS
NAUSEA: 1
APPETITE CHANGE: 1
SHORTNESS OF BREATH: 0
ABDOMINAL PAIN: 0
FEVER: 0
VOMITING: 1
CHILLS: 0
HEADACHES: 1

## 2024-05-18 ASSESSMENT — COLUMBIA-SUICIDE SEVERITY RATING SCALE - C-SSRS
6. HAVE YOU EVER DONE ANYTHING, STARTED TO DO ANYTHING, OR PREPARED TO DO ANYTHING TO END YOUR LIFE?: NO
6. HAVE YOU EVER DONE ANYTHING, STARTED TO DO ANYTHING, OR PREPARED TO DO ANYTHING TO END YOUR LIFE?: NO
2. HAVE YOU ACTUALLY HAD ANY THOUGHTS OF KILLING YOURSELF?: NO
2. HAVE YOU ACTUALLY HAD ANY THOUGHTS OF KILLING YOURSELF?: NO
1. IN THE PAST MONTH, HAVE YOU WISHED YOU WERE DEAD OR WISHED YOU COULD GO TO SLEEP AND NOT WAKE UP?: NO
1. IN THE PAST MONTH, HAVE YOU WISHED YOU WERE DEAD OR WISHED YOU COULD GO TO SLEEP AND NOT WAKE UP?: NO

## 2024-05-18 ASSESSMENT — ACTIVITIES OF DAILY LIVING (ADL)
DRESSING YOURSELF: NEEDS ASSISTANCE
TOILETING: NEEDS ASSISTANCE
HEARING - LEFT EAR: FUNCTIONAL
JUDGMENT_ADEQUATE_SAFELY_COMPLETE_DAILY_ACTIVITIES: YES
ADEQUATE_TO_COMPLETE_ADL: YES
FEEDING YOURSELF: INDEPENDENT
GROOMING: NEEDS ASSISTANCE
WALKS IN HOME: NEEDS ASSISTANCE
PATIENT'S MEMORY ADEQUATE TO SAFELY COMPLETE DAILY ACTIVITIES?: YES
LACK_OF_TRANSPORTATION: NO
BATHING: NEEDS ASSISTANCE
ASSISTIVE_DEVICE: WHEELCHAIR
HEARING - RIGHT EAR: FUNCTIONAL

## 2024-05-18 ASSESSMENT — COGNITIVE AND FUNCTIONAL STATUS - GENERAL
PERSONAL GROOMING: A LITTLE
STANDING UP FROM CHAIR USING ARMS: A LITTLE
DRESSING REGULAR LOWER BODY CLOTHING: A LOT
MOVING FROM LYING ON BACK TO SITTING ON SIDE OF FLAT BED WITH BEDRAILS: A LITTLE
TOILETING: A LOT
CLIMB 3 TO 5 STEPS WITH RAILING: A LITTLE
MOVING TO AND FROM BED TO CHAIR: A LITTLE
PATIENT BASELINE BEDBOUND: NO
DRESSING REGULAR UPPER BODY CLOTHING: A LITTLE
EATING MEALS: A LITTLE
DAILY ACTIVITIY SCORE: 15
WALKING IN HOSPITAL ROOM: A LITTLE
TURNING FROM BACK TO SIDE WHILE IN FLAT BAD: A LITTLE
MOBILITY SCORE: 18
HELP NEEDED FOR BATHING: A LOT

## 2024-05-18 ASSESSMENT — PATIENT HEALTH QUESTIONNAIRE - PHQ9
2. FEELING DOWN, DEPRESSED OR HOPELESS: NOT AT ALL
1. LITTLE INTEREST OR PLEASURE IN DOING THINGS: NOT AT ALL
1. LITTLE INTEREST OR PLEASURE IN DOING THINGS: NOT AT ALL
SUM OF ALL RESPONSES TO PHQ9 QUESTIONS 1 & 2: 0

## 2024-05-18 ASSESSMENT — PAIN - FUNCTIONAL ASSESSMENT
PAIN_FUNCTIONAL_ASSESSMENT: 0-10
PAIN_FUNCTIONAL_ASSESSMENT: 0-10

## 2024-05-18 ASSESSMENT — PAIN SCALES - GENERAL
PAINLEVEL_OUTOF10: 8
PAINLEVEL_OUTOF10: 8
PAINLEVEL_OUTOF10: 0 - NO PAIN
PAINLEVEL_OUTOF10: 3

## 2024-05-18 NOTE — ED PROVIDER NOTES
HPI   Chief Complaint   Patient presents with    Chest Pain    Vomiting     Pain is more upper abdominal quadrant. Vomiting x2 days       53-year-old female with past medical significant for non-insulin-dependent insulin requiring diabetes, GERD, anxiety, thyroid disease presents to the emergency department complaining of nausea and vomiting and abdominal pain for the past few days, now also chest pain.  Has been taking her medications.  Denies any fevers or chills.  No bloody emesis or bloody stool.  No other complaints.                          No data recorded                     Patient History   Past Medical History:   Diagnosis Date    Anxiety     Candidiasis, unspecified 12/27/2017    Yeast infection    Disease of thyroid gland     Dorsalgia, unspecified 04/23/2015    Dorsodynia    Gastro-esophageal reflux disease without esophagitis 04/21/2021    GERD (gastroesophageal reflux disease)    Generalized anxiety disorder 10/05/2022    Generalized anxiety disorder with panic attacks    Hypothyroidism, unspecified 11/17/2022    Hypothyroidism    Ocular pain, left eye 06/06/2021    Left eye pain    Other conditions influencing health status 11/16/2022    Diabetes mellitus type 2, uncontrolled    Otitis media, unspecified, unspecified ear 03/19/2018    Recurrent otitis media    Personal history of other diseases of the circulatory system     History of hypertension    Personal history of other diseases of the respiratory system 01/29/2018    History of acute sinusitis    Personal history of other infectious and parasitic diseases 08/13/2018    History of candidiasis    Personal history of other infectious and parasitic diseases 11/17/2015    History of viral infection    Personal history of other specified conditions     History of nausea    Personal history of other specified conditions 03/12/2019    History of vomiting    Personal history of other specified conditions 07/10/2014    History of diarrhea    Personal  history of other specified conditions 08/08/2018    History of headache    Pure hypercholesterolemia, unspecified 02/16/2022    Hypercholesterolemia    Radiculopathy, lumbar region 05/20/2016    Right lumbar radiculopathy    Tachycardia, unspecified 02/16/2022    Tachycardia    Troponin level elevated 01/19/2024     Past Surgical History:   Procedure Laterality Date    CARDIAC CATHETERIZATION N/A 5/20/2024    Procedure: Left Heart Cath;  Surgeon: Dharmesh Turner MD;  Location: [facility] Cardiac Cath Lab;  Service: Cardiovascular;  Laterality: N/A;    MOUTH SURGERY  08/26/2014    Oral Surgery Tooth Extraction    TONSILLECTOMY  03/04/2014    Tonsillectomy With Adenoidectomy    TUBAL LIGATION  08/26/2014    Tubal Ligation     Family History   Problem Relation Name Age of Onset    Anxiety disorder Mother Karolyn Terrell     Other (back injury) Mother Karolyn Terrell     Depression Mother Karolyn Terrell         recurrent    Other (cardiac disorder) Father      Heart attack Father      Anxiety disorder Brother Wild     Hypertension Brother Wild     Other (king disease) Brother Wild      Social History     Tobacco Use    Smoking status: Never     Passive exposure: Never    Smokeless tobacco: Never   Vaping Use    Vaping status: Never Used   Substance Use Topics    Alcohol use: Never    Drug use: Never       Physical Exam   ED Triage Vitals [05/18/24 0556]   Temperature Heart Rate Respirations BP   37 °C (98.6 °F) (!) 106 (!) 22 (!) 168/120      Pulse Ox Temp Source Heart Rate Source Patient Position   99 % Temporal Monitor Lying      BP Location FiO2 (%)     Right arm --       Physical Exam  Constitutional:       Appearance: She is well-developed.   HENT:      Head: Normocephalic and atraumatic.   Cardiovascular:      Rate and Rhythm: Regular rhythm. Tachycardia present.      Heart sounds: Normal heart sounds.   Pulmonary:      Effort: Pulmonary effort is normal.      Breath sounds: Normal breath sounds.   Abdominal:       General: Bowel sounds are normal.      Palpations: Abdomen is soft.      Tenderness: There is abdominal tenderness.      Comments: Diffuse lower abdominal tenderness to palpation worse in the right lower quadrant   Musculoskeletal:         General: Normal range of motion.   Skin:     General: Skin is warm and dry.      Capillary Refill: Capillary refill takes less than 2 seconds.   Neurological:      Mental Status: She is alert and oriented to person, place, and time.   Psychiatric:         Behavior: Behavior normal.         ED Course & MDM   ED Course as of 05/26/24 1917   Sat May 18, 2024   0619 EKG done at 5:53 AM which I reviewed and independently interpreted reveals a sinus tachycardia with PVCs at a rate of 107 with poor R wave progression, normal axis, normal QT QTc, no STEMI. [RM]      ED Course User Index  [RM] Shashank Moses MD         Diagnoses as of 05/26/24 1917   Chest pain, unspecified type   Abdominal pain, unspecified abdominal location   Type 2 diabetes mellitus with hyperglycemia, with long-term current use of insulin (Multi)   Nausea and vomiting, unspecified vomiting type   NSTEMI (non-ST elevated myocardial infarction) (Multi)   Pneumonia due to infectious organism, unspecified laterality, unspecified part of lung       Medical Decision Making  53-year-old who presents to the emergency department with chest pain and abdominal pain.  Differential includes pneumonia, bronchitis, acute coronary syndrome, dissection, pneumothorax, intra-abdominal pathology including dissection, bowel obstruction, bowel perforation, appendicitis, mesenteric adenitis, gynecologic cause.  Blood work which I reviewed and independently interpreted reveals a white count of 10.8 with normal H&H and platelet count.  Glucose is slightly elevated at 317.  Rest of electrolytes are within reasonable range.  BUN is slightly elevated at 27 but creatinine is normal with a normal GFR.  Urine is pending.  Cardiac enzymes are  pending.  Magnesium is normal.  Chest x-ray which I reviewed independently interpreted reveals no acute pneumonia, pneumothorax or congestive heart failure.  Patient signed out to incoming physician Dr. Stevens pending results, reevaluation and disposition.    Amount and/or Complexity of Data Reviewed  Labs:  Decision-making details documented in ED Course.  Radiology:  Decision-making details documented in ED Course.  ECG/medicine tests:  Decision-making details documented in ED Course.      Labs Reviewed   CBC WITH AUTO DIFFERENTIAL - Abnormal       Result Value    WBC 10.8      nRBC 0.0      RBC 4.12      Hemoglobin 12.4      Hematocrit 37.1      MCV 90      MCH 30.1      MCHC 33.4      RDW 12.4      Platelets 326      Neutrophils % 86.0      Immature Granulocytes %, Automated 0.8      Lymphocytes % 9.7      Monocytes % 3.3      Eosinophils % 0.0      Basophils % 0.2      Neutrophils Absolute 9.32 (*)     Immature Granulocytes Absolute, Automated 0.09      Lymphocytes Absolute 1.05 (*)     Monocytes Absolute 0.36      Eosinophils Absolute 0.00      Basophils Absolute 0.02     COMPREHENSIVE METABOLIC PANEL - Abnormal    Glucose 317 (*)     Sodium 134 (*)     Potassium 4.2      Chloride 93 (*)     Bicarbonate 22      Anion Gap 23 (*)     Urea Nitrogen 27 (*)     Creatinine 0.83      eGFR 85      Calcium 9.6      Albumin 4.4      Alkaline Phosphatase 95      Total Protein 7.9       (*)     Bilirubin, Total 0.7      ALT 80 (*)    LIPASE - Abnormal    Lipase 4 (*)     Narrative:     Venipuncture immediately after or during the administration of Metamizole may lead to falsely low results. Testing should be performed immediately prior to Metamizole dosing.   SERIAL TROPONIN-INITIAL - Abnormal    Troponin I, High Sensitivity 330 (*)     Narrative:     Less than 99th percentile of normal range cutoff-  Female and children under 18 years old <14 ng/L; Male <21 ng/L: Negative  Repeat testing should be performed if  clinically indicated.     Female and children under 18 years old 14-50 ng/L; Male 21-50 ng/L:  Consistent with possible cardiac damage and possible increased clinical   risk. Serial measurements may help to assess extent of myocardial damage.     >50 ng/L: Consistent with cardiac damage, increased clinical risk and  myocardial infarction. Serial measurements may help assess extent of   myocardial damage.      NOTE: Children less than 1 year old may have higher baseline troponin   levels and results should be interpreted in conjunction with the overall   clinical context.     NOTE: Troponin I testing is performed using a different   testing methodology at [facility] than at other   [facility]. Direct result comparisons should only   be made within the same method.   SERIAL TROPONIN, 1 HOUR - Abnormal    Troponin I, High Sensitivity 655 (*)     Narrative:     Less than 99th percentile of normal range cutoff-  Female and children under 18 years old <14 ng/L; Male <21 ng/L: Negative  Repeat testing should be performed if clinically indicated.     Female and children under 18 years old 14-50 ng/L; Male 21-50 ng/L:  Consistent with possible cardiac damage and possible increased clinical   risk. Serial measurements may help to assess extent of myocardial damage.     >50 ng/L: Consistent with cardiac damage, increased clinical risk and  myocardial infarction. Serial measurements may help assess extent of   myocardial damage.      NOTE: Children less than 1 year old may have higher baseline troponin   levels and results should be interpreted in conjunction with the overall   clinical context.     NOTE: Troponin I testing is performed using a different   testing methodology at [facility] than at other   [facility]. Direct result comparisons should only   be made within the same method.   URINALYSIS WITH REFLEX CULTURE AND MICROSCOPIC - Abnormal    Color, Urine Light-Yellow       Appearance, Urine Clear      Specific Gravity, Urine 1.034      pH, Urine 6.0      Protein, Urine 30 (1+) (*)     Glucose, Urine OVER (4+) (*)     Blood, Urine 0.2 (2+) (*)     Ketones, Urine 100 (3+) (*)     Bilirubin, Urine NEGATIVE      Urobilinogen, Urine Normal      Nitrite, Urine NEGATIVE      Leukocyte Esterase, Urine NEGATIVE     BLOOD GAS VENOUS FULL PANEL - Abnormal    POCT pH, Venous 7.41      POCT pCO2, Venous 40 (*)     POCT pO2, Venous 35      POCT SO2, Venous 54      POCT Oxy Hemoglobin, Venous 53.6      POCT Hematocrit Calculated, Venous 36.0      POCT Sodium, Venous 133 (*)     POCT Potassium, Venous 3.7      POCT Chloride, Venous 93 (*)     POCT Ionized Calicum, Venous 1.18      POCT Glucose, Venous 340 (*)     POCT Lactate, Venous 2.3 (*)     POCT Base Excess, Venous 0.7      POCT HCO3 Calculated, Venous 25.4      POCT Hemoglobin, Venous 12.1      POCT Anion Gap, Venous 18.0      Patient Temperature        FiO2 21     APTT - Abnormal    aPTT 20 (*)     Narrative:     The APTT is no longer used for monitoring Unfractionated Heparin Therapy. For monitoring Heparin Therapy, use the Heparin Assay.   POCT GLUCOSE - Abnormal    POCT Glucose 261 (*)    MAGNESIUM - Normal    Magnesium 1.90     PROTIME-INR - Normal    Protime 12.5      INR 1.1     TROPONIN SERIES- (INITIAL, 1 HR)    Narrative:     The following orders were created for panel order Troponin I Series, High Sensitivity (0, 1 HR).  Procedure                               Abnormality         Status                     ---------                               -----------         ------                     Troponin I, High Sensiti...[[id number]]  Abnormal            Final result               Troponin, High Sensitivi...[[id number]]  Abnormal            Final result                 Please view results for these tests on the individual orders.   URINALYSIS WITH REFLEX CULTURE AND MICROSCOPIC    Narrative:     The following orders were created for panel  order Urinalysis with Reflex Culture and Microscopic.  Procedure                               Abnormality         Status                     ---------                               -----------         ------                     Urinalysis with Reflex C...[[id number]]  Abnormal            Final result               Extra Urine Gray Tube[[id number]]                            Final result                 Please view results for these tests on the individual orders.   EXTRA URINE GRAY TUBE    Extra Tube Hold for add-ons.     POCT GLUCOSE METER   URINALYSIS MICROSCOPIC WITH REFLEX CULTURE    WBC, Urine 1-5      RBC, Urine 1-2      Squamous Epithelial Cells, Urine 1-9 (SPARSE)      Transitional Epithelial Cells, Urine 1-2 (FEW)      Mucus, Urine FEW       CT head wo IV contrast   Final Result   No CT evidence for acute intracranial pathology.        Signed by: Matty Laboy 5/18/2024 12:36 PM   Dictation workstation:   [id number]      CT angio chest for pulmonary embolism   Final Result   1. No pulmonary embolus.   2. Small patchy airspace opacity in the right lower lobe with   tree-in-bud opacities, and also present to a lesser degree in the   left lower lobe and lateral segment of the right middle lobe.   Findings are concerning for atypical or early multifocal pneumonia.   Aspiration could also have this appearance given the findings of   severe reflux. The esophagus is patulous with extensive fluid   throughout the esophagus.             Signed by: Matty Laboy 5/18/2024 8:44 AM   Dictation workstation:   [id number]      CT abdomen pelvis w IV contrast   Final Result   No CT evidence for acute pathology within the abdomen or pelvis.        Signed by: Matty Laboy 5/18/2024 9:42 AM   Dictation workstation:   [id number]      XR chest 1 view   Final Result   No acute cardiopulmonary process.             MACRO:   None.        Signed by: Bradford Stokes 5/18/2024 6:41 AM   Dictation workstation:    [id number]              Procedure  Procedures     Shashank Moses MD  05/18/24 0732       Shashank Moses MD  05/18/24 0734       Shashank Moses MD  05/26/24 1917

## 2024-05-18 NOTE — PROGRESS NOTES
Patient was signed out to me at change of shift by the previous ED team.  Please see previous provider's note for complete history and physical exam.    Briefly, this is a 53-year-old female, presenting to the emergency department for several days of nausea and vomiting as well as chest pain which began this morning.  She states that the vomiting is bilious in nature.  Patient had lab work including at time of signout, the patient is pending troponin and imaging.   On physical exam, the patient is resting comfortably in bed, no acute distress.  She does still complain of chest pain, however EKGs are nonischemic.  Troponin came back at 330, patient had already taken 325 mg of aspirin prior to arrival,  As such no further aspirin was administered.  CT PE study was nonconcerning for any evidence of pulmonary embolism, however showed possible pneumonia.  Repeat troponin increased to the 600s, as such the patient was started on a heparin drip CT abdomen pelvis was overall grossly unremarkable.  Consulted spoke with the hospitalist at [facility] who ultimately accepted the patient in admission.  I did relay the results to the patient, who states that she would like to hold off on the antibiotic at this time as she has had bad reactions to antibiotics in the past, as such there was delay in getting the patient antibiotics for her possible pneumonia.  On reevaluation, patient is still refusing antibiotics, and now is complaining of a headache which started shortly after the heparin was started.  As such CT head was obtained to rule out intracranial hemorrhage, and was unremarkable.  Patient was ultimately transferred in stable condition to [facility] for further care.        Impression: NSTEMI   Disposition: transfer  Condition: stable     Carlo Liriano,   Emergency Medicine

## 2024-05-18 NOTE — ED NOTES
Aspirin not given to patient, as pt. States she took 4 baby aspirin (81mg) this morning around 0600 at home prior to arrival.     Lona Craven RN  05/18/24 8614     Called pharmacy and cancelled clonidine on file. Mom has replied back. Routed to [place].

## 2024-05-18 NOTE — H&P
History and Physical Note  Patient: Azucena Hagan   Age: 53 y.o. Gender: female     History of Present Illness:   Admission Reason: No chief complaint on file.    HPI:   Azucena Hagan is a 53 y.o. year old female with PMH DM, hypothyroid, DDD, Anxiety presented to OSH for vomiting and CP. She said she developed non bloody nausea/vomiting 3 days PTA. She also has sharp pain across right anterior chest and lower right breast. Denies fever, chills, abdominal pain, URI symptoms. In the ED, she is found to have NSTEMI and concern for pneumonia. She declined abx at OSH. She is started on hep gtt. She is transferred to [facility] for further management.     Review of Systems:  Review of Systems   Constitutional:  Positive for appetite change. Negative for chills and fever.   Respiratory:  Negative for shortness of breath.    Cardiovascular:  Positive for chest pain.   Gastrointestinal:  Positive for nausea and vomiting. Negative for abdominal pain.   Neurological:  Positive for headaches.   All other systems reviewed and are negative.      Allergies:   Allergies   Allergen Reactions    Nut - Unspecified Anaphylaxis     Pecans only    Pecan Nut Shortness of breath    Cephalexin Other    House Dust Unknown    Hydrocodone-Acetaminophen Hallucinations and Other     Hallucinations    Insulin Aspart Itching    Insulin Regular Unknown    Nickel Other     Nickel-skin irritation    Red Dye Unknown    Tree And Shrub Pollen Swelling    Venom-Wasp Other       Past Medical History:  Past Medical History:   Diagnosis Date    Anxiety     Candidiasis, unspecified 12/27/2017    Yeast infection    Disease of thyroid gland     Dorsalgia, unspecified 04/23/2015    Dorsodynia    Gastro-esophageal reflux disease without esophagitis 04/21/2021    GERD (gastroesophageal reflux disease)    Generalized anxiety disorder 10/05/2022    Generalized anxiety disorder with panic attacks    Hypothyroidism, unspecified 11/17/2022    Hypothyroidism     Ocular pain, left eye 06/06/2021    Left eye pain    Other conditions influencing health status 11/16/2022    Diabetes mellitus type 2, uncontrolled    Otitis media, unspecified, unspecified ear 03/19/2018    Recurrent otitis media    Personal history of other diseases of the circulatory system     History of hypertension    Personal history of other diseases of the respiratory system 01/29/2018    History of acute sinusitis    Personal history of other infectious and parasitic diseases 08/13/2018    History of candidiasis    Personal history of other infectious and parasitic diseases 11/17/2015    History of viral infection    Personal history of other specified conditions     History of nausea    Personal history of other specified conditions 03/12/2019    History of vomiting    Personal history of other specified conditions 07/10/2014    History of diarrhea    Personal history of other specified conditions 08/08/2018    History of headache    Pure hypercholesterolemia, unspecified 02/16/2022    Hypercholesterolemia    Radiculopathy, lumbar region 05/20/2016    Right lumbar radiculopathy    Tachycardia, unspecified 02/16/2022    Tachycardia    Troponin level elevated 01/19/2024       Past Surgical History:   Past Surgical History:   Procedure Laterality Date    MOUTH SURGERY  08/26/2014    Oral Surgery Tooth Extraction    TONSILLECTOMY  03/04/2014    Tonsillectomy With Adenoidectomy    TUBAL LIGATION  08/26/2014    Tubal Ligation       Family History:  Family History   Problem Relation Name Age of Onset    Anxiety disorder Mother Karolyn Terrell     Other (back injury) Mother Karolyn Terrell     Depression Mother Karolyn Xander         recurrent    Other (cardiac disorder) Father      Heart attack Father      Anxiety disorder Brother Wild     Hypertension Brother Wild     Other (king disease) Brother Wild        Social History:  Social History     Tobacco Use   Smoking Status Never    Passive exposure: Never  "  Smokeless Tobacco Never       Social History     Substance and Sexual Activity   Alcohol Use Never       Social History     Substance and Sexual Activity   Drug Use Never       Home Medications:  Current Outpatient Medications   Medication Instructions    ALPRAZolam (XANAX) 1 mg, oral, 3 times daily PRN    BD Ultra-Fine Patience Pen Needle 32 gauge x 5/32\" needle 4 times a day.    clindamycin (Cleocin T) 1 % external solution Topical, 1-2 times daily    cyclobenzaprine (FLEXERIL) 10 mg, oral, 3 times daily PRN    EPINEPHrine (EPIPEN) 0.3 mg, intramuscular, Once as needed    famotidine (Pepcid) 10 mg tablet oral, 2 times daily PRN    flash glucose sensor kit (FreeStyle Alvarado 2 Sensor) kit For continuous glucose monitoring, change sensor every 14 days    FreeStyle Alvarado reader (FreeStyle Alvarado 2 Wheeler) misc Use as instructed. Scan sensor for glucose readings as directed    insulin glargine-yfgn 35 Units, subcutaneous, Nightly    L norgest/e.estradioL-e.estrad (Camrese) 0.15 mg-30 mcg (84)/10 mcg (7) tablets,dose pack,3 month tablet 1 tablet, oral, Daily    L norgest/e.estradioL-e.estrad (Seasonique) 0.15 mg-30 mcg (84)/10 mcg (7) tablets,dose pack,3 month tablet TAKE 1 TABLET BY MOUTH ONCE DAILY. *NEED AUGUST APPT*    L-norgest/e.estradiol-e.estrad (CAMRESE ORAL) 1 tablet, oral, Daily    lancets (ONETOUCH DELICA SAFETY LANCET MISC) miscellaneous, 3 times daily    lansoprazole (Prevacid) 30 mg DR capsule TAKE 1 CAPSULE (30 MG) BY MOUTH 2 TIMES A DAY.    naloxone (NARCAN) 4 mg, nasal, While pt lay on there back May repeat every 2-3 minutes if needed, alternating nostrils, until medical assistance becomes available.    ondansetron ODT (Zofran-ODT) 8 mg disintegrating tablet DISSOLVE 1 TABLET IN MOUTH EVERY 8 HOURS AS NEEDED    OneTouch Delica Plus Lancet 33 gauge misc     pen needle, diabetic 32 gauge x 5/32\" needle USE FOR INSULIN INJECTIONS FOUR TIMES DAILY AS DIRECTED    Synthroid 100 mcg, oral, Daily before breakfast " "   temazepam (RESTORIL) 30 mg, oral, Nightly PRN    Trokendi XR 50 mg capsule,extended release 24hr TAKE 1 CAPSULE(50MG) BY MOUTH ONCE DAILY WITH 300MG TROKENDI FOR TOTAL DAILY DOSE OF 350MG    Trokendi  mg, oral, Daily, In addition to the  50mg cap for total of 350mg daily       Vitals:  Visit Vitals  Ht 1.575 m (5' 2.01\")   BMI 20.85 kg/m²   Smoking Status Never   BSA 1.5 m²       Physical Exam  Vitals and nursing note reviewed.   Constitutional:       General: She is not in acute distress.     Appearance: She is ill-appearing. She is not toxic-appearing.   HENT:      Head: Normocephalic and atraumatic.      Nose: Nose normal.      Mouth/Throat:      Mouth: Mucous membranes are moist.   Eyes:      Extraocular Movements: Extraocular movements intact.      Conjunctiva/sclera: Conjunctivae normal.      Pupils: Pupils are equal, round, and reactive to light.   Cardiovascular:      Rate and Rhythm: Normal rate and regular rhythm.      Heart sounds: No murmur heard.     No gallop.   Pulmonary:      Effort: Pulmonary effort is normal. No respiratory distress.      Breath sounds: Normal breath sounds. No wheezing, rhonchi or rales.   Abdominal:      General: Abdomen is flat. Bowel sounds are normal. There is no distension.      Palpations: Abdomen is soft. There is no mass.      Tenderness: There is no abdominal tenderness.   Musculoskeletal:         General: No swelling or tenderness. Normal range of motion.      Cervical back: Normal range of motion and neck supple.   Skin:     General: Skin is warm and dry.   Neurological:      General: No focal deficit present.      Mental Status: She is alert and oriented to person, place, and time. Mental status is at baseline.   Psychiatric:         Mood and Affect: Mood normal.         Behavior: Behavior normal.         Thought Content: Thought content normal.         Judgment: Judgment normal.         Labs and Imaging Results:  Lab Results   Component Value Date    WBC 10.8 " 05/18/2024       CT head wo IV contrast  Narrative: Interpreted By:  Matty Laboy,   STUDY:  JUSTINA JESSICA; 5/18/2024 12:01 pm      INDICATION:  Signs/Symptoms:Headache after heparin started for NSTEMI;      COMPARISON:  None      ACCESSION NUMBER(S):  [id number]      ORDERING CLINICIAN:  JUSTINA JESSICA      TECHNIQUE:  Contiguous axial images were acquired from the vertex through the  posterior fossa without IV contrast. All CT examinations are  performed with 1 or more of the following dose reduction techniques:  Automated exposure control, adjustment of mA and/or kv according to  patient's size, or use of iterative reconstruction techniques.      FINDINGS:  No focal mass effect or midline shift is identified. The ventricles  and sulci are symmetric and appropriate for the patient's age.      The gray white matter differentiation is preserved.      No acute intracranial hemorrhage is seen. No intra-axial or  extra-axial fluid collection is seen.      The visualized paranasal sinuses and mastoid air cells are clear.      Impression: No CT evidence for acute intracranial pathology.      Signed by: Matty Laboy 5/18/2024 12:36 PM  Dictation workstation:   [id number]  CT abdomen pelvis w IV contrast  Narrative: Interpreted By:  Matty Laboy,   STUDY:  CT ABDOMEN PELVIS W IV CONTRAST; 5/18/2024 7:34 am      INDICATION:  Signs/Symptoms:abdominal pain;      COMPARISON:  11/20/2023      ACCESSION NUMBER(S):  [id number]      ORDERING CLINICIAN:  CLAUDIA OLSEN      TECHNIQUE:  Contiguous axial images of the abdomen/pelvis were performed with IV  contrast. 75 ml of Omnipaque 350 was utilized. Coronal and sagittal  reformatted images were also obtained. All CT examinations are  performed with 1 or more of the following dose reduction techniques:  Automated exposure control, adjustment of mA and/or kv according to  patient's size, or use of iterative reconstruction techniques.           FINDINGS:  The liver, gallbladder,  common bile duct, pancreas, spleen, and  adrenal glands are unremarkable.      The kidneys enhance symmetrically. No urolithiasis is seen. No  hydroureteronephrosis is seen.      The visualized aorta is unremarkable.      The small bowel is not dilated. The appendix is not seen however no  evidence for acute inflammatory process. The colon is unremarkable  with a normal stool burden and no acute inflammatory process.      No free intraperitoneal air or fluid is seen.      The bladder is well distended with no gross wall thickening.      The visualized osseous structures are intact.      Limited images of the lower thorax, please see dedicated chest CT  obtained at the same time.      Impression: No CT evidence for acute pathology within the abdomen or pelvis.      Signed by: Matty Laboy 5/18/2024 9:42 AM  Dictation workstation:   [id number]  CT angio chest for pulmonary embolism  Narrative: Interpreted By:  Matty Laboy,   STUDY:  CT ANGIO CHEST FOR PULMONARY EMBOLISM; 5/18/2024 7:34 am      INDICATION:  Signs/Symptoms:chest pain.      COMPARISON:  None      ACCESSION NUMBER(S):  [id number]      ORDERING CLINICIAN:  CLAUDIA OLSEN      TECHNIQUE:  Contiguous axial images of the thorax were obtained from the level of  the thoracic inlet through the lung bases. 3-D MIPS were created,  processed and reviewed on a separate workstation. 100 ml of Omnipaque  350 was utilized. All CT examinations are performed with 1 or more of  the following dose reduction techniques: Automated exposure control,  adjustment of mA and/or kv according to patient's size, or use of  iterative reconstruction techniques.      FINDINGS:  The partially visualized thyroid gland is unremarkable.      There is adequate contrast opacification of the pulmonary arterial  vasculature. No filling defect or vessel cutoff to indicate pulmonary  embolus.      The heart size is within normal limits.  No  pericardial effusion is  identified. The aorta and pulmonary arteries are normal in caliber.  No thoracic aortic dissection.      There are no pathologically enlarged mediastinal, hilar, or axillary  lymph nodes are seen.      The trachea and mainstem bronchi are patent. No pneumothorax or  pleural effusion. However there are tree-in-bud opacities in the  right lower lobe and to a lesser degree the left lower lobe and  lateral segment of the right middle lobe. Findings are concerning for  atypical or early multifocal pneumonia. Patchy nonspecific airspace  opacities are seen in these locations, follow-up to resolution is  recommended. Aspiration could also have this appearance given the  findings of severe reflux. The esophagus is patulous with extensive  fluid throughout the esophagus.      The visualized osseous structures are intact.      Limited images through the upper abdomen are unremarkable.      Impression: 1. No pulmonary embolus.  2. Small patchy airspace opacity in the right lower lobe with  tree-in-bud opacities, and also present to a lesser degree in the  left lower lobe and lateral segment of the right middle lobe.  Findings are concerning for atypical or early multifocal pneumonia.  Aspiration could also have this appearance given the findings of  severe reflux. The esophagus is patulous with extensive fluid  throughout the esophagus.          Signed by: Matty Laboy 5/18/2024 8:44 AM  Dictation workstation:   [id number]  XR chest 1 view  Narrative: Interpreted By:  Bradford Stokes,   STUDY:  XR CHEST 1 VIEW;  5/18/2024 6:34 am      INDICATION:  Signs/Symptoms:chest pain.      COMPARISON:  11/20/2023      ACCESSION NUMBER(S):  [id number]      ORDERING CLINICIAN:  CLAUDIA OLSEN      FINDINGS:          The cardiomediastinal silhouette and pulmonary vasculature are within  normal limits. No consolidation, pleural effusion or pneumothorax.          Impression: No acute cardiopulmonary  process.          MACRO:  None.      Signed by: Bradford Stokes 5/18/2024 6:41 AM  Dictation workstation:   [id number]      Assessment and Plan:    Principal Problem:    NSTEMI (non-ST elevated myocardial infarction) (Multi)      Patient Active Problem List   Diagnosis    Abdominal pain    Back pain    Acne    Allergic rhinitis    Anxiety    Cellulitis of trunk    Cervical spondylosis without myelopathy    Chronic daily headache    Chronic ear pain    Chronic insomnia    Chronic lower back pain    Dermatitis    Dysfunction of right eustachian tube    Facet degeneration of lumbosacral region    Spondylosis without myelopathy or radiculopathy, lumbar region    Fatigue    Generalized anxiety disorder with panic attacks    GERD (gastroesophageal reflux disease)    Herniated lumbar intervertebral disc    Other intervertebral disc displacement, lumbosacral region    Hypothyroidism    Intractable chronic migraine without aura and without status migrainosus    Leucocytosis    Menorrhagia    Migraine, unspecified, not intractable, without status migrainosus    Ocular migraine    Polycystic ovary syndrome    Primary localized osteoarthritis of right hip    Right lumbar radiculopathy    Tachycardia    Traumatic tear of left rotator cuff    Vitamin D deficiency    Dizziness    UTI (urinary tract infection)    Wound of foot    Dehydration    History of hypokalemia    Hypokalemia    Poorly controlled type 2 diabetes mellitus (Multi)    Dorsodynia    Long term (current) use of opiate analgesic    Left against medical advice    Lung nodule    Syncope and collapse    Troponin level elevated    ANDERSON (dyspnea on exertion)    NSTEMI (non-ST elevated myocardial infarction) (Multi)     NSTEMI  Possibly from demand ischemia  - admit to stepdown  - cardio consult  - trend trop  - hep gtt  - statin/ASA    Concern for Gram - pneumonia  - ID consult  - Check procal  - Uag  - duoneb  - zosyn  - speech eval    N/V  - reglan PRN  -  IVF    Tachycardia  - 1x lopressor    BP elevated  - low thresholdw starting BP meds    GERD  - protonix    Transaminitis  - monitor    AG, LA  - repeat BMP  - IVF    DM  - endo consult  - lantus  - ISS    Anxiety  - xanax PRN    Hypothyroid   - Synthroid    Diet: cardiac, NPO after midnight  DVT ppx: hep gtt  Code status: DNR CCA/DNI confirmed with pt  Dispo: admit under inpatient status anticipating greater than forty-eight hours or two midnights stay.     Paris Hernandez MD  Hospitalist

## 2024-05-19 ENCOUNTER — APPOINTMENT (OUTPATIENT)
Dept: RADIOLOGY | Facility: HOSPITAL | Age: 54
DRG: 286 | End: 2024-05-19
Payer: COMMERCIAL

## 2024-05-19 LAB
ALBUMIN SERPL BCP-MCNC: 3.3 G/DL (ref 3.4–5)
ALP SERPL-CCNC: 68 U/L (ref 33–110)
ALT SERPL W P-5'-P-CCNC: 52 U/L (ref 7–45)
AMYLASE SERPL-CCNC: 13 U/L (ref 29–103)
ANION GAP SERPL CALC-SCNC: 14 MMOL/L (ref 10–20)
AST SERPL W P-5'-P-CCNC: 33 U/L (ref 9–39)
B-OH-BUTYR SERPL-SCNC: 5.67 MMOL/L (ref 0.02–0.27)
BASOPHILS # BLD AUTO: 0.01 X10*3/UL (ref 0–0.1)
BASOPHILS NFR BLD AUTO: 0.1 %
BILIRUB SERPL-MCNC: 0.7 MG/DL (ref 0–1.2)
BUN SERPL-MCNC: 19 MG/DL (ref 6–23)
CALCIUM SERPL-MCNC: 8.2 MG/DL (ref 8.6–10.3)
CARDIAC TROPONIN I PNL SERPL HS: 1170 NG/L (ref 0–13)
CHLORIDE SERPL-SCNC: 101 MMOL/L (ref 98–107)
CHOLEST SERPL-MCNC: 169 MG/DL (ref 0–199)
CHOLESTEROL/HDL RATIO: 3.6
CO2 SERPL-SCNC: 25 MMOL/L (ref 21–32)
CREAT SERPL-MCNC: 0.8 MG/DL (ref 0.5–1.05)
CRP SERPL-MCNC: 9.72 MG/DL
EGFRCR SERPLBLD CKD-EPI 2021: 89 ML/MIN/1.73M*2
EOSINOPHIL # BLD AUTO: 0 X10*3/UL (ref 0–0.7)
EOSINOPHIL NFR BLD AUTO: 0 %
ERYTHROCYTE [DISTWIDTH] IN BLOOD BY AUTOMATED COUNT: 12.3 % (ref 11.5–14.5)
ERYTHROCYTE [SEDIMENTATION RATE] IN BLOOD BY WESTERGREN METHOD: 38 MM/H (ref 0–30)
GLUCOSE BLD MANUAL STRIP-MCNC: 121 MG/DL (ref 74–99)
GLUCOSE BLD MANUAL STRIP-MCNC: 163 MG/DL (ref 74–99)
GLUCOSE BLD MANUAL STRIP-MCNC: 181 MG/DL (ref 74–99)
GLUCOSE SERPL-MCNC: 177 MG/DL (ref 74–99)
HCT VFR BLD AUTO: 30.9 % (ref 36–46)
HDLC SERPL-MCNC: 46.7 MG/DL
HGB BLD-MCNC: 10.6 G/DL (ref 12–16)
IMM GRANULOCYTES # BLD AUTO: 0.16 X10*3/UL (ref 0–0.7)
IMM GRANULOCYTES NFR BLD AUTO: 1.4 % (ref 0–0.9)
LDLC SERPL CALC-MCNC: 102 MG/DL
LIPASE SERPL-CCNC: 19 U/L (ref 9–82)
LYMPHOCYTES # BLD AUTO: 1.77 X10*3/UL (ref 1.2–4.8)
LYMPHOCYTES NFR BLD AUTO: 16 %
MAGNESIUM SERPL-MCNC: 1.66 MG/DL (ref 1.6–2.4)
MCH RBC QN AUTO: 29.4 PG (ref 26–34)
MCHC RBC AUTO-ENTMCNC: 34.3 G/DL (ref 32–36)
MCV RBC AUTO: 86 FL (ref 80–100)
MONOCYTES # BLD AUTO: 0.43 X10*3/UL (ref 0.1–1)
MONOCYTES NFR BLD AUTO: 3.9 %
NEUTROPHILS # BLD AUTO: 8.71 X10*3/UL (ref 1.2–7.7)
NEUTROPHILS NFR BLD AUTO: 78.6 %
NON HDL CHOLESTEROL: 122 MG/DL (ref 0–149)
NRBC BLD-RTO: 0 /100 WBCS (ref 0–0)
PLATELET # BLD AUTO: 325 X10*3/UL (ref 150–450)
POTASSIUM SERPL-SCNC: 2.8 MMOL/L (ref 3.5–5.3)
PROT SERPL-MCNC: 6.2 G/DL (ref 6.4–8.2)
RBC # BLD AUTO: 3.6 X10*6/UL (ref 4–5.2)
SODIUM SERPL-SCNC: 137 MMOL/L (ref 136–145)
TRIGL SERPL-MCNC: 101 MG/DL (ref 0–149)
UFH PPP CHRO-ACNC: 0.6 IU/ML
VLDL: 20 MG/DL (ref 0–40)
WBC # BLD AUTO: 11.1 X10*3/UL (ref 4.4–11.3)

## 2024-05-19 PROCEDURE — 76705 ECHO EXAM OF ABDOMEN: CPT | Performed by: STUDENT IN AN ORGANIZED HEALTH CARE EDUCATION/TRAINING PROGRAM

## 2024-05-19 PROCEDURE — 84484 ASSAY OF TROPONIN QUANT: CPT | Performed by: STUDENT IN AN ORGANIZED HEALTH CARE EDUCATION/TRAINING PROGRAM

## 2024-05-19 PROCEDURE — 86140 C-REACTIVE PROTEIN: CPT | Performed by: INTERNAL MEDICINE

## 2024-05-19 PROCEDURE — 82947 ASSAY GLUCOSE BLOOD QUANT: CPT

## 2024-05-19 PROCEDURE — 83735 ASSAY OF MAGNESIUM: CPT | Performed by: STUDENT IN AN ORGANIZED HEALTH CARE EDUCATION/TRAINING PROGRAM

## 2024-05-19 PROCEDURE — 2500000001 HC RX 250 WO HCPCS SELF ADMINISTERED DRUGS (ALT 637 FOR MEDICARE OP): Performed by: STUDENT IN AN ORGANIZED HEALTH CARE EDUCATION/TRAINING PROGRAM

## 2024-05-19 PROCEDURE — 99233 SBSQ HOSP IP/OBS HIGH 50: CPT | Performed by: STUDENT IN AN ORGANIZED HEALTH CARE EDUCATION/TRAINING PROGRAM

## 2024-05-19 PROCEDURE — 2500000004 HC RX 250 GENERAL PHARMACY W/ HCPCS (ALT 636 FOR OP/ED): Performed by: STUDENT IN AN ORGANIZED HEALTH CARE EDUCATION/TRAINING PROGRAM

## 2024-05-19 PROCEDURE — 85520 HEPARIN ASSAY: CPT | Performed by: STUDENT IN AN ORGANIZED HEALTH CARE EDUCATION/TRAINING PROGRAM

## 2024-05-19 PROCEDURE — 84075 ASSAY ALKALINE PHOSPHATASE: CPT | Performed by: STUDENT IN AN ORGANIZED HEALTH CARE EDUCATION/TRAINING PROGRAM

## 2024-05-19 PROCEDURE — 85025 COMPLETE CBC W/AUTO DIFF WBC: CPT | Performed by: STUDENT IN AN ORGANIZED HEALTH CARE EDUCATION/TRAINING PROGRAM

## 2024-05-19 PROCEDURE — 83690 ASSAY OF LIPASE: CPT | Performed by: INTERNAL MEDICINE

## 2024-05-19 PROCEDURE — 2500000006 HC RX 250 W HCPCS SELF ADMINISTERED DRUGS (ALT 637 FOR ALL PAYERS): Performed by: STUDENT IN AN ORGANIZED HEALTH CARE EDUCATION/TRAINING PROGRAM

## 2024-05-19 PROCEDURE — 82150 ASSAY OF AMYLASE: CPT | Performed by: INTERNAL MEDICINE

## 2024-05-19 PROCEDURE — 87449 NOS EACH ORGANISM AG IA: CPT | Mod: GEALAB | Performed by: STUDENT IN AN ORGANIZED HEALTH CARE EDUCATION/TRAINING PROGRAM

## 2024-05-19 PROCEDURE — 83718 ASSAY OF LIPOPROTEIN: CPT | Performed by: STUDENT IN AN ORGANIZED HEALTH CARE EDUCATION/TRAINING PROGRAM

## 2024-05-19 PROCEDURE — 99222 1ST HOSP IP/OBS MODERATE 55: CPT | Performed by: INTERNAL MEDICINE

## 2024-05-19 PROCEDURE — 36415 COLL VENOUS BLD VENIPUNCTURE: CPT | Performed by: STUDENT IN AN ORGANIZED HEALTH CARE EDUCATION/TRAINING PROGRAM

## 2024-05-19 PROCEDURE — 2500000002 HC RX 250 W HCPCS SELF ADMINISTERED DRUGS (ALT 637 FOR MEDICARE OP, ALT 636 FOR OP/ED): Performed by: INTERNAL MEDICINE

## 2024-05-19 PROCEDURE — 85652 RBC SED RATE AUTOMATED: CPT | Performed by: INTERNAL MEDICINE

## 2024-05-19 PROCEDURE — 2060000001 HC INTERMEDIATE ICU ROOM DAILY

## 2024-05-19 PROCEDURE — 76705 ECHO EXAM OF ABDOMEN: CPT

## 2024-05-19 PROCEDURE — 87899 AGENT NOS ASSAY W/OPTIC: CPT | Mod: GEALAB | Performed by: STUDENT IN AN ORGANIZED HEALTH CARE EDUCATION/TRAINING PROGRAM

## 2024-05-19 PROCEDURE — 2500000004 HC RX 250 GENERAL PHARMACY W/ HCPCS (ALT 636 FOR OP/ED)

## 2024-05-19 RX ORDER — INSULIN GLARGINE 100 [IU]/ML
20 INJECTION, SOLUTION SUBCUTANEOUS ONCE
Status: COMPLETED | OUTPATIENT
Start: 2024-05-19 | End: 2024-05-19

## 2024-05-19 RX ORDER — DEXTROSE 50 % IN WATER (D50W) INTRAVENOUS SYRINGE
25
Status: DISCONTINUED | OUTPATIENT
Start: 2024-05-19 | End: 2024-05-24 | Stop reason: HOSPADM

## 2024-05-19 RX ORDER — LORAZEPAM 2 MG/ML
1 INJECTION INTRAMUSCULAR ONCE
Status: DISCONTINUED | OUTPATIENT
Start: 2024-05-19 | End: 2024-05-21

## 2024-05-19 RX ORDER — LORAZEPAM 2 MG/ML
INJECTION INTRAMUSCULAR
Status: DISPENSED
Start: 2024-05-19 | End: 2024-05-20

## 2024-05-19 RX ORDER — POTASSIUM CHLORIDE 20 MEQ/1
40 TABLET, EXTENDED RELEASE ORAL ONCE
Status: COMPLETED | OUTPATIENT
Start: 2024-05-19 | End: 2024-05-19

## 2024-05-19 RX ORDER — INSULIN LISPRO 100 [IU]/ML
0-15 INJECTION, SOLUTION INTRAVENOUS; SUBCUTANEOUS
Status: DISCONTINUED | OUTPATIENT
Start: 2024-05-19 | End: 2024-05-24 | Stop reason: HOSPADM

## 2024-05-19 RX ADMIN — HEPARIN SODIUM 1200 UNITS/HR: 10000 INJECTION, SOLUTION INTRAVENOUS at 04:47

## 2024-05-19 RX ADMIN — INSULIN LISPRO 8 UNITS: 100 INJECTION, SOLUTION INTRAVENOUS; SUBCUTANEOUS at 16:24

## 2024-05-19 RX ADMIN — INSULIN LISPRO 4 UNITS: 100 INJECTION, SOLUTION INTRAVENOUS; SUBCUTANEOUS at 09:29

## 2024-05-19 RX ADMIN — ATORVASTATIN CALCIUM 40 MG: 40 TABLET, FILM COATED ORAL at 20:26

## 2024-05-19 RX ADMIN — INSULIN GLARGINE 20 UNITS: 100 INJECTION, SOLUTION SUBCUTANEOUS at 21:59

## 2024-05-19 RX ADMIN — POTASSIUM CHLORIDE 40 MEQ: 1500 TABLET, EXTENDED RELEASE ORAL at 09:02

## 2024-05-19 RX ADMIN — LEVOTHYROXINE SODIUM 100 MCG: 100 TABLET ORAL at 06:39

## 2024-05-19 RX ADMIN — SODIUM CHLORIDE 100 ML/HR: 9 INJECTION, SOLUTION INTRAVENOUS at 13:25

## 2024-05-19 RX ADMIN — ALPRAZOLAM 1 MG: 0.5 TABLET ORAL at 20:26

## 2024-05-19 RX ADMIN — PIPERACILLIN SODIUM AND TAZOBACTAM SODIUM 3.38 G: 3; .375 INJECTION, SOLUTION INTRAVENOUS at 04:43

## 2024-05-19 RX ADMIN — CYCLOBENZAPRINE HYDROCHLORIDE 10 MG: 10 TABLET, FILM COATED ORAL at 21:44

## 2024-05-19 RX ADMIN — PIPERACILLIN SODIUM AND TAZOBACTAM SODIUM 3.38 G: 3; .375 INJECTION, SOLUTION INTRAVENOUS at 16:26

## 2024-05-19 RX ADMIN — METOPROLOL TARTRATE 25 MG: 25 TABLET, FILM COATED ORAL at 09:03

## 2024-05-19 RX ADMIN — TOPIRAMATE 150 MG: 100 TABLET, FILM COATED ORAL at 20:27

## 2024-05-19 RX ADMIN — TOPIRAMATE 200 MG: 25 TABLET, FILM COATED ORAL at 09:03

## 2024-05-19 RX ADMIN — SODIUM CHLORIDE 100 ML/HR: 9 INJECTION, SOLUTION INTRAVENOUS at 02:44

## 2024-05-19 RX ADMIN — PIPERACILLIN SODIUM AND TAZOBACTAM SODIUM 3.38 G: 3; .375 INJECTION, SOLUTION INTRAVENOUS at 22:54

## 2024-05-19 RX ADMIN — ASPIRIN 81 MG 81 MG: 81 TABLET ORAL at 09:03

## 2024-05-19 RX ADMIN — TEMAZEPAM 30 MG: 15 CAPSULE ORAL at 21:44

## 2024-05-19 RX ADMIN — METOPROLOL TARTRATE 25 MG: 25 TABLET, FILM COATED ORAL at 20:26

## 2024-05-19 RX ADMIN — PIPERACILLIN SODIUM AND TAZOBACTAM SODIUM 3.38 G: 3; .375 INJECTION, SOLUTION INTRAVENOUS at 11:15

## 2024-05-19 ASSESSMENT — COGNITIVE AND FUNCTIONAL STATUS - GENERAL
DRESSING REGULAR LOWER BODY CLOTHING: A LOT
DAILY ACTIVITIY SCORE: 15
CLIMB 3 TO 5 STEPS WITH RAILING: A LITTLE
WALKING IN HOSPITAL ROOM: A LITTLE
MOBILITY SCORE: 18
EATING MEALS: A LITTLE
TOILETING: A LOT
HELP NEEDED FOR BATHING: A LOT
PERSONAL GROOMING: A LITTLE
DRESSING REGULAR UPPER BODY CLOTHING: A LITTLE
MOVING FROM LYING ON BACK TO SITTING ON SIDE OF FLAT BED WITH BEDRAILS: A LITTLE
TURNING FROM BACK TO SIDE WHILE IN FLAT BAD: A LITTLE
MOVING TO AND FROM BED TO CHAIR: A LITTLE
STANDING UP FROM CHAIR USING ARMS: A LITTLE

## 2024-05-19 ASSESSMENT — ENCOUNTER SYMPTOMS
UNEXPECTED WEIGHT CHANGE: 0
VOMITING: 1
COUGH: 1
ABDOMINAL PAIN: 1
SHORTNESS OF BREATH: 0
ENDOCRINE COMMENTS: AS ABOVE
NAUSEA: 1

## 2024-05-19 ASSESSMENT — PAIN SCALES - GENERAL
PAINLEVEL_OUTOF10: 0 - NO PAIN
PAINLEVEL_OUTOF10: 4

## 2024-05-19 ASSESSMENT — ACTIVITIES OF DAILY LIVING (ADL): LACK_OF_TRANSPORTATION: NO

## 2024-05-19 ASSESSMENT — PAIN - FUNCTIONAL ASSESSMENT: PAIN_FUNCTIONAL_ASSESSMENT: 0-10

## 2024-05-19 NOTE — CONSULTS
Consults  Referred by BRIANA Hernandez MD: Kaci Saha,     Reason For Consult  Aspiration pneumonia / intolerance to antibiotics    History Of Present Illness  Azucena Hagan is a 53 y.o. female, hx of DM, hx of chronic back pain, GERD, CAD, anxiety, she was admitted for nausea, emesis and chest pain, she was dx with MI, her ct showed RLLinfiltrates suggestive of aspiration, negative CT of the abdomen, no fever, no cough, has RUQ abdominal pain, no diarrhea, she develops diarrhea with antibiotics     Past Medical History  She has a past medical history of Anxiety, Candidiasis, unspecified (12/27/2017), Disease of thyroid gland, Dorsalgia, unspecified (04/23/2015), Gastro-esophageal reflux disease without esophagitis (04/21/2021), Generalized anxiety disorder (10/05/2022), Hypothyroidism, unspecified (11/17/2022), Ocular pain, left eye (06/06/2021), Other conditions influencing health status (11/16/2022), Otitis media, unspecified, unspecified ear (03/19/2018), Personal history of other diseases of the circulatory system, Personal history of other diseases of the respiratory system (01/29/2018), Personal history of other infectious and parasitic diseases (08/13/2018), Personal history of other infectious and parasitic diseases (11/17/2015), Personal history of other specified conditions, Personal history of other specified conditions (03/12/2019), Personal history of other specified conditions (07/10/2014), Personal history of other specified conditions (08/08/2018), Pure hypercholesterolemia, unspecified (02/16/2022), Radiculopathy, lumbar region (05/20/2016), Tachycardia, unspecified (02/16/2022), and Troponin level elevated (01/19/2024).    Surgical History  She has a past surgical history that includes Tonsillectomy (03/04/2014); Tubal ligation (08/26/2014); and Mouth surgery (08/26/2014).     Social History     Occupational History    Not on file   Tobacco Use    Smoking status: Never     Passive  "exposure: Never    Smokeless tobacco: Never   Vaping Use    Vaping status: Never Used   Substance and Sexual Activity    Alcohol use: Never    Drug use: Never    Sexual activity: Not on file     Travel History   Travel since 04/19/24    No documented travel since 04/19/24            Family History  Family History   Problem Relation Name Age of Onset    Anxiety disorder Mother Karolyn Terrell     Other (back injury) Mother Karolyn Terrell     Depression Mother Karolyn Terrell         recurrent    Other (cardiac disorder) Father      Heart attack Father      Anxiety disorder Brother Wild     Hypertension Brother Wild     Other (king disease) Brother Wild      Allergies  Nut - unspecified, Pecan nut, Cephalexin, House dust, Hydrocodone-acetaminophen, Insulin aspart, Insulin regular, Nickel, Red dye, Statins-hmg-coa reductase inhibitors, Tree and shrub pollen, and Venom-wasp     Immunization History   Administered Date(s) Administered    Hepatitis B vaccine, adult (RECOMBIVAX, ENGERIX) 07/09/1999, 08/13/1999    Moderna SARS-CoV-2 Vaccination 04/19/2022    Pfizer Purple Cap SARS-CoV-2 10/28/2021     Medications  Home medications:  Medications Prior to Admission   Medication Sig Dispense Refill Last Dose    ALPRAZolam (Xanax) 1 mg tablet Take 1 tablet (1 mg) by mouth 3 times a day as needed for anxiety. 90 tablet 3 5/18/2024    BD Ultra-Fine Patience Pen Needle 32 gauge x 5/32\" needle 4 times a day.   5/18/2024    clindamycin (Cleocin T) 1 % external solution Apply topically. 1-2 times daily   Past Week    cyclobenzaprine (Flexeril) 10 mg tablet Take 1 tablet (10 mg) by mouth 3 times a day as needed for muscle spasms. 30 tablet 2 5/17/2024    famotidine (Pepcid) 10 mg tablet Take by mouth 2 times a day as needed.   5/18/2024    flash glucose sensor kit (FreeStyle Alvarado 2 Sensor) kit For continuous glucose monitoring, change sensor every 14 days 6 each 3 5/18/2024    FreeStyle Alvarado reader (FreeStyle Alvarado 2 [place]) misc Use " "as instructed. Scan sensor for glucose readings as directed 1 each 0 5/18/2024    insulin glargine-yfgn 100 unit/mL (3 mL) Pen Inject 35 Units under the skin once daily at bedtime. 45 mL 3 5/17/2024    lancets (ONETOUCH DELICA SAFETY LANCET MISC) 3 times a day.   5/17/2024    lansoprazole (Prevacid) 30 mg DR capsule TAKE 1 CAPSULE (30 MG) BY MOUTH 2 TIMES A DAY. 180 capsule 1 5/17/2024    pen needle, diabetic 32 gauge x 5/32\" needle USE FOR INSULIN INJECTIONS FOUR TIMES DAILY AS DIRECTED 400 each 3 5/17/2024    EPINEPHrine (EpiPen 2-Td) 0.3 mg/0.3 mL injection syringe Inject 0.3 mL (0.3 mg) into the muscle 1 time if needed for anaphylaxis for up to 2 doses. 2 each 1 Unknown    L norgest/e.estradioL-e.estrad (Camrese) 0.15 mg-30 mcg (84)/10 mcg (7) tablets,dose pack,3 month tablet Take 1 tablet by mouth once daily. (Patient not taking: Reported on 2/14/2024) 91 tablet 3 Unknown    L norgest/e.estradioL-e.estrad (Seasonique) 0.15 mg-30 mcg (84)/10 mcg (7) tablets,dose pack,3 month tablet TAKE 1 TABLET BY MOUTH ONCE DAILY. *NEED AUGUST APPT* 91 tablet 0 Unknown    L-norgest/e.estradiol-e.estrad (CAMRESE ORAL) Take 1 tablet by mouth once daily.   Unknown    naloxone (Narcan) 4 mg/0.1 mL nasal spray Administer 1 spray (4 mg) into affected nostril(s). While pt lay on there back May repeat every 2-3 minutes if needed, alternating nostrils, until medical assistance becomes available.   Unknown    ondansetron ODT (Zofran-ODT) 8 mg disintegrating tablet DISSOLVE 1 TABLET IN MOUTH EVERY 8 HOURS AS NEEDED 42 tablet 0     OneTouch Delica Plus Lancet 33 gauge misc    Unknown    Synthroid 100 mcg tablet Take 1 tablet (100 mcg) by mouth once daily in the morning. Take before meals. 90 tablet 3 5/15/2024    temazepam (Restoril) 30 mg capsule Take 1 capsule (30 mg) by mouth as needed at bedtime (insomnia). 30 capsule 3 5/15/2024    Trokendi  mg capsule,extended release 24hr Take 300 mg by mouth once daily. In addition to the  " "50mg cap for total of 350mg daily 270 capsule 1 5/15/2024    Trokendi XR 50 mg capsule,extended release 24hr TAKE 1 CAPSULE(50MG) BY MOUTH ONCE DAILY WITH 300MG TROKENDI FOR TOTAL DAILY DOSE OF 350MG 90 capsule 1 5/15/2024     Current medications:  Scheduled medications  aspirin, 81 mg, oral, Daily  atorvastatin, 40 mg, oral, Nightly  insulin glargine, 35 Units, subcutaneous, q PM  insulin lispro, 0-15 Units, subcutaneous, TID  levothyroxine, 100 mcg, oral, Daily before breakfast  metoprolol tartrate, 25 mg, oral, BID  piperacillin-tazobactam, 3.375 g, intravenous, q6h  topiramate, 150 mg, oral, Nightly  topiramate, 200 mg, oral, Daily      Continuous medications  heparin, 0-4,000 Units/hr, Last Rate: 1,200 Units/hr (05/19/24 0447)  sodium chloride 0.9%, 100 mL/hr, Last Rate: 100 mL/hr (05/19/24 0244)      PRN medications  PRN medications: ALPRAZolam, cyclobenzaprine, dextrose, dextrose, glucagon, glucagon, heparin, metoclopramide, temazepam    Review of Systems   All other systems reviewed and are negative.       Objective  Range of Vitals (last 24 hours)  Heart Rate:  []   Temp:  [36.5 °C (97.7 °F)-37.5 °C (99.5 °F)]   Resp:  [15-21]   BP: (121-140)/()   Height:  [157.5 cm (5' 2.01\")]   Weight:  [54 kg (119 lb)]   SpO2:  [96 %-100 %]   Daily Weight  05/18/24 : 54 kg (119 lb)    Body mass index is 21.76 kg/m².     Physical Exam  Constitutional:       Appearance: Normal appearance.   HENT:      Head: Normocephalic and atraumatic.      Mouth/Throat:      Mouth: Mucous membranes are moist.      Pharynx: Oropharynx is clear.   Eyes:      Pupils: Pupils are equal, round, and reactive to light.   Cardiovascular:      Rate and Rhythm: Normal rate and regular rhythm.      Heart sounds: Normal heart sounds.   Pulmonary:      Effort: Pulmonary effort is normal.      Breath sounds: Rales present.   Abdominal:      General: Abdomen is flat. Bowel sounds are normal.      Palpations: Abdomen is soft.      Tenderness: " "There is abdominal tenderness.   Musculoskeletal:      Cervical back: Normal range of motion.   Neurological:      Mental Status: She is alert.          Relevant Results  Outside Hospital Results  reviewed  Labs  Results from last 72 hours   Lab Units 05/19/24  0554 05/18/24  1522 05/18/24  0625   WBC AUTO x10*3/uL 11.1 9.7 10.8   HEMOGLOBIN g/dL 10.6* 10.7* 12.4   HEMATOCRIT % 30.9* 33.3* 37.1   PLATELETS AUTO x10*3/uL 325 292 326   NEUTROS PCT AUTO % 78.6  --  86.0   LYMPHS PCT AUTO % 16.0  --  9.7   MONOS PCT AUTO % 3.9  --  3.3   EOS PCT AUTO % 0.0  --  0.0     Results from last 72 hours   Lab Units 05/19/24  0554 05/18/24  1522 05/18/24  0625   SODIUM mmol/L 137 137 134*   POTASSIUM mmol/L 2.8* 3.6 4.2   CHLORIDE mmol/L 101 96* 93*   CO2 mmol/L 25 19* 22   BUN mg/dL 19 24* 27*   CREATININE mg/dL 0.80 0.79 0.83   GLUCOSE mg/dL 177* 304* 317*   CALCIUM mg/dL 8.2* 9.0 9.6   ANION GAP mmol/L 14 26* 23*   EGFR mL/min/1.73m*2 89 >90 85     Results from last 72 hours   Lab Units 05/19/24  0554 05/18/24  1522 05/18/24  0625   ALK PHOS U/L 68 77 95   BILIRUBIN TOTAL mg/dL 0.7 0.5 0.7   PROTEIN TOTAL g/dL 6.2* 7.1 7.9   ALT U/L 52* 66* 80*   AST U/L 33 53* 103*   ALBUMIN g/dL 3.3* 3.8 4.4     Estimated Creatinine Clearance: 65.8 mL/min (by C-G formula based on SCr of 0.8 mg/dL).  C-Reactive Protein   Date Value Ref Range Status   11/22/2023 0.30 <1.00 mg/dL Final     CRP   Date Value Ref Range Status   06/20/2023 0.56 mg/dL Final     Comment:     REF VALUE  < 1.00       Sedimentation Rate   Date Value Ref Range Status   06/20/2023 6 0 - 20 mm/h Final     No results found for: \"HIV1X2\", \"HIVCONF\", \"BYSUXE0YQ\"  No results found for: \"HEPCABINIT\", \"HEPCAB\", \"HCVPCRQUANT\"  Microbiology  Reviewed  Imaging  Reviewed       Assessment/Plan   Suspected aspiration pneumonia  Abdominal pain    Recommendations :  Agreed to take Zosyn for now  US RUQ  Follow the LFT  Amylase and lipase   Inflammatory markers    I spent minutes in the " professional and overall care of this patient.      Khari Williamson MD

## 2024-05-19 NOTE — PROGRESS NOTES
"Azucena Hagan is a 53 y.o. female on day 1 of admission presenting with NSTEMI (non-ST elevated myocardial infarction) (Multi).    Subjective   Pt says she is starting to feel better. N/V has improved. She still has RUQ abdominal pain       Objective     Physical Exam  Vitals and nursing note reviewed.   Constitutional:       General: She is not in acute distress.     Appearance: Normal appearance. She is not ill-appearing or toxic-appearing.   HENT:      Head: Normocephalic and atraumatic.      Nose: Nose normal.      Mouth/Throat:      Mouth: Mucous membranes are moist.   Eyes:      Extraocular Movements: Extraocular movements intact.      Conjunctiva/sclera: Conjunctivae normal.      Pupils: Pupils are equal, round, and reactive to light.   Cardiovascular:      Rate and Rhythm: Normal rate and regular rhythm.      Heart sounds: No murmur heard.     No gallop.   Pulmonary:      Effort: Pulmonary effort is normal. No respiratory distress.      Breath sounds: Rhonchi present. No wheezing or rales.   Abdominal:      General: Abdomen is flat. Bowel sounds are normal. There is distension.      Palpations: Abdomen is soft. There is no mass.      Tenderness: There is no abdominal tenderness.   Musculoskeletal:         General: No swelling or tenderness. Normal range of motion.      Cervical back: Normal range of motion and neck supple.   Skin:     General: Skin is warm and dry.   Neurological:      General: No focal deficit present.      Mental Status: She is alert and oriented to person, place, and time. Mental status is at baseline.   Psychiatric:         Mood and Affect: Mood normal.         Behavior: Behavior normal.         Thought Content: Thought content normal.         Judgment: Judgment normal.         Last Recorded Vitals:  /81   Pulse 97   Temp 36.5 °C (97.7 °F) (Temporal)   Resp 16   Ht 1.575 m (5' 2.01\")   Wt 54 kg (119 lb)   SpO2 99%   BMI 21.76 kg/m²      Scheduled medications:  aspirin, 81 " mg, oral, Daily  atorvastatin, 40 mg, oral, Nightly  insulin glargine, 35 Units, subcutaneous, q PM  insulin lispro, 0-15 Units, subcutaneous, q4h  levothyroxine, 100 mcg, oral, Daily before breakfast  metoprolol tartrate, 25 mg, oral, BID  piperacillin-tazobactam, 3.375 g, intravenous, q6h  topiramate, 150 mg, oral, Nightly  topiramate, 200 mg, oral, Daily      Continuous medications:  heparin, 0-4,000 Units/hr, Last Rate: 1,200 Units/hr (05/19/24 3225)  sodium chloride 0.9%, 100 mL/hr, Last Rate: 100 mL/hr (05/19/24 8333)      PRN medications:  PRN medications: ALPRAZolam, cyclobenzaprine, dextrose, dextrose, glucagon, glucagon, heparin, metoclopramide, temazepam     Relevant Results:  Results for orders placed or performed during the hospital encounter of 05/18/24 (from the past 24 hour(s))   Comprehensive Metabolic Panel   Result Value Ref Range    Glucose 304 (H) 74 - 99 mg/dL    Sodium 137 136 - 145 mmol/L    Potassium 3.6 3.5 - 5.3 mmol/L    Chloride 96 (L) 98 - 107 mmol/L    Bicarbonate 19 (L) 21 - 32 mmol/L    Anion Gap 26 (H) 10 - 20 mmol/L    Urea Nitrogen 24 (H) 6 - 23 mg/dL    Creatinine 0.79 0.50 - 1.05 mg/dL    eGFR >90 >60 mL/min/1.73m*2    Calcium 9.0 8.6 - 10.3 mg/dL    Albumin 3.8 3.4 - 5.0 g/dL    Alkaline Phosphatase 77 33 - 110 U/L    Total Protein 7.1 6.4 - 8.2 g/dL    AST 53 (H) 9 - 39 U/L    Bilirubin, Total 0.5 0.0 - 1.2 mg/dL    ALT 66 (H) 7 - 45 U/L   CBC   Result Value Ref Range    WBC 9.7 4.4 - 11.3 x10*3/uL    nRBC 0.0 0.0 - 0.0 /100 WBCs    RBC 3.59 (L) 4.00 - 5.20 x10*6/uL    Hemoglobin 10.7 (L) 12.0 - 16.0 g/dL    Hematocrit 33.3 (L) 36.0 - 46.0 %    MCV 93 80 - 100 fL    MCH 29.8 26.0 - 34.0 pg    MCHC 32.1 32.0 - 36.0 g/dL    RDW 12.6 11.5 - 14.5 %    Platelets 292 150 - 450 x10*3/uL   Troponin I, High Sensitivity   Result Value Ref Range    Troponin I, High Sensitivity 1,049 (HH) 0 - 13 ng/L   POCT GLUCOSE   Result Value Ref Range    POCT Glucose 301 (H) 74 - 99 mg/dL   Heparin  Assay, UFH   Result Value Ref Range    Heparin Unfractionated 0.6 See Comment Below for Therapeutic Ranges IU/mL   POCT GLUCOSE   Result Value Ref Range    POCT Glucose 267 (H) 74 - 99 mg/dL   Heparin Assay, UFH   Result Value Ref Range    Heparin Unfractionated 0.5 See Comment Below for Therapeutic Ranges IU/mL   POCT GLUCOSE   Result Value Ref Range    POCT Glucose 121 (H) 74 - 99 mg/dL   POCT GLUCOSE   Result Value Ref Range    POCT Glucose 163 (H) 74 - 99 mg/dL   Comprehensive Metabolic Panel   Result Value Ref Range    Glucose 177 (H) 74 - 99 mg/dL    Sodium 137 136 - 145 mmol/L    Potassium 2.8 (LL) 3.5 - 5.3 mmol/L    Chloride 101 98 - 107 mmol/L    Bicarbonate 25 21 - 32 mmol/L    Anion Gap 14 10 - 20 mmol/L    Urea Nitrogen 19 6 - 23 mg/dL    Creatinine 0.80 0.50 - 1.05 mg/dL    eGFR 89 >60 mL/min/1.73m*2    Calcium 8.2 (L) 8.6 - 10.3 mg/dL    Albumin 3.3 (L) 3.4 - 5.0 g/dL    Alkaline Phosphatase 68 33 - 110 U/L    Total Protein 6.2 (L) 6.4 - 8.2 g/dL    AST 33 9 - 39 U/L    Bilirubin, Total 0.7 0.0 - 1.2 mg/dL    ALT 52 (H) 7 - 45 U/L   CBC and Auto Differential   Result Value Ref Range    WBC 11.1 4.4 - 11.3 x10*3/uL    nRBC 0.0 0.0 - 0.0 /100 WBCs    RBC 3.60 (L) 4.00 - 5.20 x10*6/uL    Hemoglobin 10.6 (L) 12.0 - 16.0 g/dL    Hematocrit 30.9 (L) 36.0 - 46.0 %    MCV 86 80 - 100 fL    MCH 29.4 26.0 - 34.0 pg    MCHC 34.3 32.0 - 36.0 g/dL    RDW 12.3 11.5 - 14.5 %    Platelets 325 150 - 450 x10*3/uL    Neutrophils % 78.6 40.0 - 80.0 %    Immature Granulocytes %, Automated 1.4 (H) 0.0 - 0.9 %    Lymphocytes % 16.0 13.0 - 44.0 %    Monocytes % 3.9 2.0 - 10.0 %    Eosinophils % 0.0 0.0 - 6.0 %    Basophils % 0.1 0.0 - 2.0 %    Neutrophils Absolute 8.71 (H) 1.20 - 7.70 x10*3/uL    Immature Granulocytes Absolute, Automated 0.16 0.00 - 0.70 x10*3/uL    Lymphocytes Absolute 1.77 1.20 - 4.80 x10*3/uL    Monocytes Absolute 0.43 0.10 - 1.00 x10*3/uL    Eosinophils Absolute 0.00 0.00 - 0.70 x10*3/uL    Basophils  Absolute 0.01 0.00 - 0.10 x10*3/uL   Heparin Assay, UFH   Result Value Ref Range    Heparin Unfractionated 0.6 See Comment Below for Therapeutic Ranges IU/mL   Magnesium   Result Value Ref Range    Magnesium 1.66 1.60 - 2.40 mg/dL   Lipid Panel   Result Value Ref Range    Cholesterol 169 0 - 199 mg/dL    HDL-Cholesterol 46.7 mg/dL    Cholesterol/HDL Ratio 3.6     LDL Calculated 102 (H) <=99 mg/dL    VLDL 20 0 - 40 mg/dL    Triglycerides 101 0 - 149 mg/dL    Non HDL Cholesterol 122 0 - 149 mg/dL   Troponin I, High Sensitivity   Result Value Ref Range    Troponin I, High Sensitivity 1,170 (HH) 0 - 13 ng/L   POCT GLUCOSE   Result Value Ref Range    POCT Glucose 181 (H) 74 - 99 mg/dL       CT head wo IV contrast    Result Date: 5/18/2024  Interpreted By:  Matty Laboy, STUDY: JUSTINA JESSICA; 5/18/2024 12:01 pm   INDICATION: Signs/Symptoms:Headache after heparin started for NSTEMI;   COMPARISON: None   ACCESSION NUMBER(S): [id number]   ORDERING CLINICIAN: JUSTINA JESSICA   TECHNIQUE: Contiguous axial images were acquired from the vertex through the posterior fossa without IV contrast. All CT examinations are performed with 1 or more of the following dose reduction techniques: Automated exposure control, adjustment of mA and/or kv according to patient's size, or use of iterative reconstruction techniques.   FINDINGS: No focal mass effect or midline shift is identified. The ventricles and sulci are symmetric and appropriate for the patient's age.   The gray white matter differentiation is preserved.   No acute intracranial hemorrhage is seen. No intra-axial or extra-axial fluid collection is seen.   The visualized paranasal sinuses and mastoid air cells are clear.       No CT evidence for acute intracranial pathology.   Signed by: Matty Laboy 5/18/2024 12:36 PM Dictation workstation:   [id number]    CT abdomen pelvis w IV contrast    Result Date: 5/18/2024  Interpreted By:  Matty Laboy, STUDY: CT  ABDOMEN PELVIS W IV CONTRAST; 5/18/2024 7:34 am   INDICATION: Signs/Symptoms:abdominal pain;   COMPARISON: 11/20/2023   ACCESSION NUMBER(S): [id number]   ORDERING CLINICIAN: CLAUDIA OLSEN   TECHNIQUE: Contiguous axial images of the abdomen/pelvis were performed with IV contrast. 75 ml of Omnipaque 350 was utilized. Coronal and sagittal reformatted images were also obtained. All CT examinations are performed with 1 or more of the following dose reduction techniques: Automated exposure control, adjustment of mA and/or kv according to patient's size, or use of iterative reconstruction techniques.     FINDINGS: The liver, gallbladder,  common bile duct, pancreas, spleen, and adrenal glands are unremarkable.   The kidneys enhance symmetrically. No urolithiasis is seen. No hydroureteronephrosis is seen.   The visualized aorta is unremarkable.   The small bowel is not dilated. The appendix is not seen however no evidence for acute inflammatory process. The colon is unremarkable with a normal stool burden and no acute inflammatory process.   No free intraperitoneal air or fluid is seen.   The bladder is well distended with no gross wall thickening.   The visualized osseous structures are intact.   Limited images of the lower thorax, please see dedicated chest CT obtained at the same time.       No CT evidence for acute pathology within the abdomen or pelvis.   Signed by: Matty Laboy 5/18/2024 9:42 AM Dictation workstation:   [id number]    CT angio chest for pulmonary embolism    Result Date: 5/18/2024  Interpreted By:  Matty Laboy, STUDY: CT ANGIO CHEST FOR PULMONARY EMBOLISM; 5/18/2024 7:34 am   INDICATION: Signs/Symptoms:chest pain.   COMPARISON: None   ACCESSION NUMBER(S): [id number]   ORDERING CLINICIAN: CLAUDIA OLSEN   TECHNIQUE: Contiguous axial images of the thorax were obtained from the level of the thoracic inlet through the lung bases. 3-D MIPS were created, processed and reviewed on a  separate workstation. 100 ml of Omnipaque 350 was utilized. All CT examinations are performed with 1 or more of the following dose reduction techniques: Automated exposure control, adjustment of mA and/or kv according to patient's size, or use of iterative reconstruction techniques.   FINDINGS: The partially visualized thyroid gland is unremarkable.   There is adequate contrast opacification of the pulmonary arterial vasculature. No filling defect or vessel cutoff to indicate pulmonary embolus.   The heart size is within normal limits.  No pericardial effusion is identified. The aorta and pulmonary arteries are normal in caliber. No thoracic aortic dissection.   There are no pathologically enlarged mediastinal, hilar, or axillary lymph nodes are seen.   The trachea and mainstem bronchi are patent. No pneumothorax or pleural effusion. However there are tree-in-bud opacities in the right lower lobe and to a lesser degree the left lower lobe and lateral segment of the right middle lobe. Findings are concerning for atypical or early multifocal pneumonia. Patchy nonspecific airspace opacities are seen in these locations, follow-up to resolution is recommended. Aspiration could also have this appearance given the findings of severe reflux. The esophagus is patulous with extensive fluid throughout the esophagus.   The visualized osseous structures are intact.   Limited images through the upper abdomen are unremarkable.       1. No pulmonary embolus. 2. Small patchy airspace opacity in the right lower lobe with tree-in-bud opacities, and also present to a lesser degree in the left lower lobe and lateral segment of the right middle lobe. Findings are concerning for atypical or early multifocal pneumonia. Aspiration could also have this appearance given the findings of severe reflux. The esophagus is patulous with extensive fluid throughout the esophagus.     Signed by: Matty Laboy 5/18/2024 8:44 AM Dictation  workstation:   [id number]    XR chest 1 view    Result Date: 5/18/2024  Interpreted By:  Bradford Stokes, STUDY: XR CHEST 1 VIEW;  5/18/2024 6:34 am   INDICATION: Signs/Symptoms:chest pain.   COMPARISON: 11/20/2023   ACCESSION NUMBER(S): [id number]   ORDERING CLINICIAN: CLAUDIA OLSEN   FINDINGS:     The cardiomediastinal silhouette and pulmonary vasculature are within normal limits. No consolidation, pleural effusion or pneumothorax.         No acute cardiopulmonary process.     MACRO: None.   Signed by: Bradford Stokes 5/18/2024 6:41 AM Dictation workstation:   [id number]           Assessment/Plan   Principal Problem:    NSTEMI (non-ST elevated myocardial infarction) (Multi)    NSTEMI  Possibly from demand ischemia  - cardio following  - trend trop  - hep gtt  - statin/ASA  - brilinta  - LHC tomorrow per cardio    Concern for Gram - pneumonia  - ID consulted  - Check procal  - Uag  - duoneb  - zosyn  - speech eval     Euglycemic DKA  Resolved  Hx DM  - endo following  - insulin  - ISS    N/V  - reglan PRN  - IVF     BP improved  - low thresholdw starting BP meds     GERD  - protonix     Transaminitis  - monitor     Anxiety  - xanax PRN     Hypothyroid   - Synthroid     Diet: cardiac, NPO after midnight  DVT ppx: hep gtt  Code status: DNR CCA/DNI confirmed with pt  Dispo: monitor clinically, LHC tomorrow per cardio         Paris Hernandez MD  Hospitalist

## 2024-05-19 NOTE — CONSULTS
"Consults  History Of Present Illness:    Azucena Hagan is a 53 y.o. female presenting with N/V.  3 days ago she had severe nauseas and multiple episodes of vomiting. A day later she developed substernal and rt sided chest pain, sharp, referred to right side of the abd. Pain was continuous,  for 2 days  No sob    No le edema  No dizziness or syncope  Currently she is pain free but has intermittent rt sided abd tenderness     Follows with Dr. Griffin        Last Recorded Vitals:  Vitals:    05/18/24 1600 05/18/24 1724 05/18/24 1952 05/19/24 0641   BP: 121/86 (!) 137/93 126/73 125/81   BP Location:  Right arm Right arm    Patient Position:  Lying Lying    Pulse: 92 107  97   Resp: 21 17 15 16   Temp:  37.1 °C (98.8 °F) 37.5 °C (99.5 °F) 36.5 °C (97.7 °F)   TempSrc:  Temporal Temporal Temporal   SpO2:   99% 99%   Weight:  54 kg (119 lb)     Height:  1.575 m (5' 2.01\")         Last Labs:  CBC - 5/19/2024:  5:54 AM  11.1 10.6 325    30.9      CMP - 5/19/2024:  5:54 AM  8.2 6.2 33 --- 0.7   1.3; CANCELED 3.3 52 68      PTT - 5/18/2024:  6:25 AM  1.1   12.5 20     Troponin I, High Sensitivity   Date/Time Value Ref Range Status   05/18/2024 03:22 PM 1,049 (HH) 0 - 13 ng/L Final     Comment:     Previous result verified on 5/18/2024 0725 on specimen/case [id number] called with component TRPHS for procedure Troponin I, High Sensitivity, Initial with value 330 ng/L.   05/18/2024 08:16  (HH) 0 - 13 ng/L Final     Comment:     Previous result verified on 5/18/2024 0725 on specimen/case [id number] called with component TRPHS for procedure Troponin I, High Sensitivity, Initial with value 330 ng/L.   05/18/2024 06:25  (HH) 0 - 13 ng/L Final     POC HEMOGLOBIN A1c   Date/Time Value Ref Range Status   10/24/2023 02:56 PM 9.5 (A) 4.2 - 6.5 % Final     Hemoglobin A1C   Date/Time Value Ref Range Status   06/21/2023 05:52 AM 13.6 (A) % Final     Comment:          Diagnosis of Diabetes-Adults   Non-Diabetic: < or = " "5.6%   Increased risk for developing diabetes: 5.7-6.4%   Diagnostic of diabetes: > or = 6.5%  .       Monitoring of Diabetes                Age (y)     Therapeutic Goal (%)   Adults:          >18           <7.0   Pediatrics:    13-18           <7.5                   7-12           <8.0                   0- 6            7.5-8.5   American Diabetes Association. Diabetes Care 33(S1), Jan 2010.     06/20/2023 06:39 AM 13.8 (A) % Final     Comment:          Diagnosis of Diabetes-Adults   Non-Diabetic: < or = 5.6%   Increased risk for developing diabetes: 5.7-6.4%   Diagnostic of diabetes: > or = 6.5%  .       Monitoring of Diabetes                Age (y)     Therapeutic Goal (%)   Adults:          >18           <7.0   Pediatrics:    13-18           <7.5                   7-12           <8.0                   0- 6            7.5-8.5   American Diabetes Association. Diabetes Care 33(S1), Jan 2010.        Last I/O:  I/O last 3 completed shifts:  In: 120 (2.2 mL/kg) [P.O.:120]  Out: 0 (0 mL/kg)   Weight: 54 kg     Past Cardiology Tests (Last 3 Years):  EKG:  ECG 12 lead 12/05/2023 (Preliminary)      ECG 12 lead 12/05/2023 (Preliminary)      ECG 12 lead 12/04/2023 (Preliminary)    Echo:  No results found for this or any previous visit from the past 1095 days.    Ejection Fractions:  No results found for: \"EF\"  Cath:  No results found for this or any previous visit from the past 1095 days.    Stress Test:  No results found for this or any previous visit from the past 1095 days.    Cardiac Imaging:  No results found for this or any previous visit from the past 1095 days.      Past Medical History:  She has a past medical history of Anxiety, Candidiasis, unspecified (12/27/2017), Disease of thyroid gland, Dorsalgia, unspecified (04/23/2015), Gastro-esophageal reflux disease without esophagitis (04/21/2021), Generalized anxiety disorder (10/05/2022), Hypothyroidism, unspecified (11/17/2022), Ocular pain, left eye (06/06/2021), " Other conditions influencing health status (11/16/2022), Otitis media, unspecified, unspecified ear (03/19/2018), Personal history of other diseases of the circulatory system, Personal history of other diseases of the respiratory system (01/29/2018), Personal history of other infectious and parasitic diseases (08/13/2018), Personal history of other infectious and parasitic diseases (11/17/2015), Personal history of other specified conditions, Personal history of other specified conditions (03/12/2019), Personal history of other specified conditions (07/10/2014), Personal history of other specified conditions (08/08/2018), Pure hypercholesterolemia, unspecified (02/16/2022), Radiculopathy, lumbar region (05/20/2016), Tachycardia, unspecified (02/16/2022), and Troponin level elevated (01/19/2024).    Past Surgical History:  She has a past surgical history that includes Tonsillectomy (03/04/2014); Tubal ligation (08/26/2014); and Mouth surgery (08/26/2014).      Social History:  She reports that she has never smoked. She has never been exposed to tobacco smoke. She has never used smokeless tobacco. She reports that she does not drink alcohol and does not use drugs.    Family History:  Family History   Problem Relation Name Age of Onset    Anxiety disorder Mother Karolyn Terrell     Other (back injury) Mother Karolyn Terrell     Depression Mother Karolyn Terrell         recurrent    Other (cardiac disorder) Father      Heart attack Father      Anxiety disorder Brother Wild     Hypertension Brother Wild     Other (king disease) Brother Wild         Allergies:  Nut - unspecified, Pecan nut, Cephalexin, House dust, Hydrocodone-acetaminophen, Insulin aspart, Insulin regular, Nickel, Red dye, Statins-hmg-coa reductase inhibitors, Tree and shrub pollen, and Venom-wasp    Inpatient Medications:  Scheduled medications   Medication Dose Route Frequency    aspirin  81 mg oral Daily    atorvastatin  40 mg oral Nightly    insulin  "glargine  35 Units subcutaneous q PM    insulin lispro  0-15 Units subcutaneous q4h    levothyroxine  100 mcg oral Daily before breakfast    metoprolol tartrate  25 mg oral BID    piperacillin-tazobactam  3.375 g intravenous q6h    potassium chloride CR  40 mEq oral Once    topiramate  150 mg oral Nightly    topiramate  200 mg oral Daily     PRN medications   Medication    ALPRAZolam    cyclobenzaprine    dextrose    dextrose    glucagon    glucagon    heparin    metoclopramide    temazepam     Continuous Medications   Medication Dose Last Rate    heparin  0-4,000 Units/hr 1,200 Units/hr (05/19/24 6497)    sodium chloride 0.9%  100 mL/hr 100 mL/hr (05/19/24 1634)     Outpatient Medications:  Current Outpatient Medications   Medication Instructions    ALPRAZolam (XANAX) 1 mg, oral, 3 times daily PRN    BD Ultra-Fine Patience Pen Needle 32 gauge x 5/32\" needle 4 times a day.    clindamycin (Cleocin T) 1 % external solution Topical, 1-2 times daily    cyclobenzaprine (FLEXERIL) 10 mg, oral, 3 times daily PRN    EPINEPHrine (EPIPEN) 0.3 mg, intramuscular, Once as needed    famotidine (Pepcid) 10 mg tablet oral, 2 times daily PRN    flash glucose sensor kit (FreeStyle Alvarado 2 Sensor) kit For continuous glucose monitoring, change sensor every 14 days    FreeStyle Alvarado reader (FreeStyle Alvarado 2 [place]) misc Use as instructed. Scan sensor for glucose readings as directed    insulin glargine-yfgn 35 Units, subcutaneous, Nightly    L norgest/e.estradioL-e.estrad (Camrese) 0.15 mg-30 mcg (84)/10 mcg (7) tablets,dose pack,3 month tablet 1 tablet, oral, Daily    L norgest/e.estradioL-e.estrad (Seasonique) 0.15 mg-30 mcg (84)/10 mcg (7) tablets,dose pack,3 month tablet TAKE 1 TABLET BY MOUTH ONCE DAILY. *NEED AUGUST APPT*    L-norgest/e.estradiol-e.estrad (CAMRESE ORAL) 1 tablet, oral, Daily    lancets (ONETOUCH DELICA SAFETY LANCET MISC) miscellaneous, 3 times daily    lansoprazole (Prevacid) 30 mg DR capsule TAKE 1 CAPSULE (30 MG) " "BY MOUTH 2 TIMES A DAY.    naloxone (NARCAN) 4 mg, nasal, While pt lay on there back May repeat every 2-3 minutes if needed, alternating nostrils, until medical assistance becomes available.    ondansetron ODT (Zofran-ODT) 8 mg disintegrating tablet DISSOLVE 1 TABLET IN MOUTH EVERY 8 HOURS AS NEEDED    OneTouch Delica Plus Lancet 33 gauge misc     pen needle, diabetic 32 gauge x 5/32\" needle USE FOR INSULIN INJECTIONS FOUR TIMES DAILY AS DIRECTED    Synthroid 100 mcg, oral, Daily before breakfast    temazepam (RESTORIL) 30 mg, oral, Nightly PRN    Trokendi XR 50 mg capsule,extended release 24hr TAKE 1 CAPSULE(50MG) BY MOUTH ONCE DAILY WITH 300MG TROKENDI FOR TOTAL DAILY DOSE OF 350MG    Trokendi  mg, oral, Daily, In addition to the  50mg cap for total of 350mg daily       Physical Exam:  Vitals:    05/19/24 0641   BP: 125/81   Pulse: 97   Resp: 16   Temp: 36.5 °C (97.7 °F)   SpO2: 99%     Constitutional:       General: She is not in acute distress.     Appearance: She is ill-appearing.   HENT:      Head: Normocephalic and atraumatic.       Eyes:      Conjunctiva/sclera: Conjunctivae normal.      Cardiovascular:      Rate and Rhythm: Normal rate and regular rhythm.      Heart sounds: No murmur heard.     No gallop.   Pulmonary:      Effort: Pulmonary effort is normal. No respiratory distress.      Breath sounds: Normal breath sounds. No wheezing, rhonchi or rales.   Abdominal:      General: Abdomen is flat. Bowel sounds are normal. There is no distension.      Palpations: Abdomen is soft.  Musculoskeletal:         General: No swelling or tenderness. Normal range of motion.      Skin:     General: Skin is warm and dry.   Neurological:      General: No focal deficit present.      Mental Status: She is alert and oriented to person, place, and time. Mental status is at baseline.   Psychiatric:         Mood and Affect: Mood normal.         Behavior: Behavior normal.              Assessment/Plan   Chest " pain  Elevated trop, NSTEMI  Abnormal EKG, prior septal infarct   IDDM  PNA    Plan  Continue ACS protocol, EULALIO heparin, monitor signs of bleeding  Continue ASA  Recommend loading with Brilinta   Continue HI statin  Continue BB  Recommend lipid panel  Recommend echo  Will plan for LHC tomorrow, keep npo after midnight          Code Status:  DNR and No Intubation    I spent  minutes in the professional and overall care of this patient.        High complex      Aneesh Hay MD

## 2024-05-19 NOTE — CARE PLAN
The patient's goals for the shift include to not be nauseated    The clinical goals for the shift include Patient will tolerate oral intake throughout this shift    Over the shift, the patient did make progress toward the following goals.

## 2024-05-19 NOTE — PROGRESS NOTES
05/19/24 1403   Discharge Planning   Living Arrangements Spouse/significant other   Support Systems Spouse/significant other   Assistance Needed A&0x3, ambulates with walker, uses wheelchair PRN, has BSC, showerchair,  room air, no cpap or bipap, not active with any HHC, 2 story house and bedroom is on 2nd floor. Patient stays only on 2nd flooor, spouse works in the day, spouse brings food upstiars while he is at work.   Type of Residence Private residence   Number of Stairs to Enter Residence 5   Number of Stairs Within Residence 19   Do you have animals or pets at home? No   Who is requesting discharge planning? Provider   Home or Post Acute Services In home services   Type of Home Care Services Home nursing visits;Home OT;Home PT   Patient expects to be discharged to: Patient requesting HHC on d/c, choices provided and selected [facility] for SN, PT, OT. [facility] to send internal referral for HHC.   Does the patient need discharge transport arranged? No   Financial Resource Strain   How hard is it for you to pay for the very basics like food, housing, medical care, and heating? Not hard   Housing Stability   In the last 12 months, was there a time when you were not able to pay the mortgage or rent on time? N   In the last 12 months, how many places have you lived? 1   In the last 12 months, was there a time when you did not have a steady place to sleep or slept in a shelter (including now)? N   Transportation Needs   In the past 12 months, has lack of transportation kept you from medical appointments or from getting medications? no   In the past 12 months, has lack of transportation kept you from meetings, work, or from getting things needed for daily living? No

## 2024-05-19 NOTE — H&P (VIEW-ONLY)
"Consults  History Of Present Illness:    Azucena Hagan is a 53 y.o. female presenting with N/V.  3 days ago she had severe nauseas and multiple episodes of vomiting. A day later she developed substernal and rt sided chest pain, sharp, referred to right side of the abd. Pain was continuous,  for 2 days  No sob    No le edema  No dizziness or syncope  Currently she is pain free but has intermittent rt sided abd tenderness     Follows with Dr. Griffin        Last Recorded Vitals:  Vitals:    05/18/24 1600 05/18/24 1724 05/18/24 1952 05/19/24 0641   BP: 121/86 (!) 137/93 126/73 125/81   BP Location:  Right arm Right arm    Patient Position:  Lying Lying    Pulse: 92 107  97   Resp: 21 17 15 16   Temp:  37.1 °C (98.8 °F) 37.5 °C (99.5 °F) 36.5 °C (97.7 °F)   TempSrc:  Temporal Temporal Temporal   SpO2:   99% 99%   Weight:  54 kg (119 lb)     Height:  1.575 m (5' 2.01\")         Last Labs:  CBC - 5/19/2024:  5:54 AM  11.1 10.6 325    30.9      CMP - 5/19/2024:  5:54 AM  8.2 6.2 33 --- 0.7   1.3; CANCELED 3.3 52 68      PTT - 5/18/2024:  6:25 AM  1.1   12.5 20     Troponin I, High Sensitivity   Date/Time Value Ref Range Status   05/18/2024 03:22 PM 1,049 (HH) 0 - 13 ng/L Final     Comment:     Previous result verified on 5/18/2024 0725 on specimen/case [id number] called with component TRPHS for procedure Troponin I, High Sensitivity, Initial with value 330 ng/L.   05/18/2024 08:16  (HH) 0 - 13 ng/L Final     Comment:     Previous result verified on 5/18/2024 0725 on specimen/case [id number] called with component TRPHS for procedure Troponin I, High Sensitivity, Initial with value 330 ng/L.   05/18/2024 06:25  (HH) 0 - 13 ng/L Final     POC HEMOGLOBIN A1c   Date/Time Value Ref Range Status   10/24/2023 02:56 PM 9.5 (A) 4.2 - 6.5 % Final     Hemoglobin A1C   Date/Time Value Ref Range Status   06/21/2023 05:52 AM 13.6 (A) % Final     Comment:          Diagnosis of Diabetes-Adults   Non-Diabetic: < or = " "5.6%   Increased risk for developing diabetes: 5.7-6.4%   Diagnostic of diabetes: > or = 6.5%  .       Monitoring of Diabetes                Age (y)     Therapeutic Goal (%)   Adults:          >18           <7.0   Pediatrics:    13-18           <7.5                   7-12           <8.0                   0- 6            7.5-8.5   American Diabetes Association. Diabetes Care 33(S1), Jan 2010.     06/20/2023 06:39 AM 13.8 (A) % Final     Comment:          Diagnosis of Diabetes-Adults   Non-Diabetic: < or = 5.6%   Increased risk for developing diabetes: 5.7-6.4%   Diagnostic of diabetes: > or = 6.5%  .       Monitoring of Diabetes                Age (y)     Therapeutic Goal (%)   Adults:          >18           <7.0   Pediatrics:    13-18           <7.5                   7-12           <8.0                   0- 6            7.5-8.5   American Diabetes Association. Diabetes Care 33(S1), Jan 2010.        Last I/O:  I/O last 3 completed shifts:  In: 120 (2.2 mL/kg) [P.O.:120]  Out: 0 (0 mL/kg)   Weight: 54 kg     Past Cardiology Tests (Last 3 Years):  EKG:  ECG 12 lead 12/05/2023 (Preliminary)      ECG 12 lead 12/05/2023 (Preliminary)      ECG 12 lead 12/04/2023 (Preliminary)    Echo:  No results found for this or any previous visit from the past 1095 days.    Ejection Fractions:  No results found for: \"EF\"  Cath:  No results found for this or any previous visit from the past 1095 days.    Stress Test:  No results found for this or any previous visit from the past 1095 days.    Cardiac Imaging:  No results found for this or any previous visit from the past 1095 days.      Past Medical History:  She has a past medical history of Anxiety, Candidiasis, unspecified (12/27/2017), Disease of thyroid gland, Dorsalgia, unspecified (04/23/2015), Gastro-esophageal reflux disease without esophagitis (04/21/2021), Generalized anxiety disorder (10/05/2022), Hypothyroidism, unspecified (11/17/2022), Ocular pain, left eye (06/06/2021), " Other conditions influencing health status (11/16/2022), Otitis media, unspecified, unspecified ear (03/19/2018), Personal history of other diseases of the circulatory system, Personal history of other diseases of the respiratory system (01/29/2018), Personal history of other infectious and parasitic diseases (08/13/2018), Personal history of other infectious and parasitic diseases (11/17/2015), Personal history of other specified conditions, Personal history of other specified conditions (03/12/2019), Personal history of other specified conditions (07/10/2014), Personal history of other specified conditions (08/08/2018), Pure hypercholesterolemia, unspecified (02/16/2022), Radiculopathy, lumbar region (05/20/2016), Tachycardia, unspecified (02/16/2022), and Troponin level elevated (01/19/2024).    Past Surgical History:  She has a past surgical history that includes Tonsillectomy (03/04/2014); Tubal ligation (08/26/2014); and Mouth surgery (08/26/2014).      Social History:  She reports that she has never smoked. She has never been exposed to tobacco smoke. She has never used smokeless tobacco. She reports that she does not drink alcohol and does not use drugs.    Family History:  Family History   Problem Relation Name Age of Onset    Anxiety disorder Mother Karolyn Terrell     Other (back injury) Mother Karolyn Terrell     Depression Mother Karolyn Terrell         recurrent    Other (cardiac disorder) Father      Heart attack Father      Anxiety disorder Brother Wild     Hypertension Brother Wild     Other (king disease) Brother Wild         Allergies:  Nut - unspecified, Pecan nut, Cephalexin, House dust, Hydrocodone-acetaminophen, Insulin aspart, Insulin regular, Nickel, Red dye, Statins-hmg-coa reductase inhibitors, Tree and shrub pollen, and Venom-wasp    Inpatient Medications:  Scheduled medications   Medication Dose Route Frequency    aspirin  81 mg oral Daily    atorvastatin  40 mg oral Nightly    insulin  "glargine  35 Units subcutaneous q PM    insulin lispro  0-15 Units subcutaneous q4h    levothyroxine  100 mcg oral Daily before breakfast    metoprolol tartrate  25 mg oral BID    piperacillin-tazobactam  3.375 g intravenous q6h    potassium chloride CR  40 mEq oral Once    topiramate  150 mg oral Nightly    topiramate  200 mg oral Daily     PRN medications   Medication    ALPRAZolam    cyclobenzaprine    dextrose    dextrose    glucagon    glucagon    heparin    metoclopramide    temazepam     Continuous Medications   Medication Dose Last Rate    heparin  0-4,000 Units/hr 1,200 Units/hr (05/19/24 0797)    sodium chloride 0.9%  100 mL/hr 100 mL/hr (05/19/24 3834)     Outpatient Medications:  Current Outpatient Medications   Medication Instructions    ALPRAZolam (XANAX) 1 mg, oral, 3 times daily PRN    BD Ultra-Fine Patience Pen Needle 32 gauge x 5/32\" needle 4 times a day.    clindamycin (Cleocin T) 1 % external solution Topical, 1-2 times daily    cyclobenzaprine (FLEXERIL) 10 mg, oral, 3 times daily PRN    EPINEPHrine (EPIPEN) 0.3 mg, intramuscular, Once as needed    famotidine (Pepcid) 10 mg tablet oral, 2 times daily PRN    flash glucose sensor kit (FreeStyle Alvarado 2 Sensor) kit For continuous glucose monitoring, change sensor every 14 days    FreeStyle Alvarado reader (FreeStyle Alvarado 2 [place]) misc Use as instructed. Scan sensor for glucose readings as directed    insulin glargine-yfgn 35 Units, subcutaneous, Nightly    L norgest/e.estradioL-e.estrad (Camrese) 0.15 mg-30 mcg (84)/10 mcg (7) tablets,dose pack,3 month tablet 1 tablet, oral, Daily    L norgest/e.estradioL-e.estrad (Seasonique) 0.15 mg-30 mcg (84)/10 mcg (7) tablets,dose pack,3 month tablet TAKE 1 TABLET BY MOUTH ONCE DAILY. *NEED AUGUST APPT*    L-norgest/e.estradiol-e.estrad (CAMRESE ORAL) 1 tablet, oral, Daily    lancets (ONETOUCH DELICA SAFETY LANCET MISC) miscellaneous, 3 times daily    lansoprazole (Prevacid) 30 mg DR capsule TAKE 1 CAPSULE (30 MG) " "BY MOUTH 2 TIMES A DAY.    naloxone (NARCAN) 4 mg, nasal, While pt lay on there back May repeat every 2-3 minutes if needed, alternating nostrils, until medical assistance becomes available.    ondansetron ODT (Zofran-ODT) 8 mg disintegrating tablet DISSOLVE 1 TABLET IN MOUTH EVERY 8 HOURS AS NEEDED    OneTouch Delica Plus Lancet 33 gauge misc     pen needle, diabetic 32 gauge x 5/32\" needle USE FOR INSULIN INJECTIONS FOUR TIMES DAILY AS DIRECTED    Synthroid 100 mcg, oral, Daily before breakfast    temazepam (RESTORIL) 30 mg, oral, Nightly PRN    Trokendi XR 50 mg capsule,extended release 24hr TAKE 1 CAPSULE(50MG) BY MOUTH ONCE DAILY WITH 300MG TROKENDI FOR TOTAL DAILY DOSE OF 350MG    Trokendi  mg, oral, Daily, In addition to the  50mg cap for total of 350mg daily       Physical Exam:  Vitals:    05/19/24 0641   BP: 125/81   Pulse: 97   Resp: 16   Temp: 36.5 °C (97.7 °F)   SpO2: 99%     Constitutional:       General: She is not in acute distress.     Appearance: She is ill-appearing.   HENT:      Head: Normocephalic and atraumatic.       Eyes:      Conjunctiva/sclera: Conjunctivae normal.      Cardiovascular:      Rate and Rhythm: Normal rate and regular rhythm.      Heart sounds: No murmur heard.     No gallop.   Pulmonary:      Effort: Pulmonary effort is normal. No respiratory distress.      Breath sounds: Normal breath sounds. No wheezing, rhonchi or rales.   Abdominal:      General: Abdomen is flat. Bowel sounds are normal. There is no distension.      Palpations: Abdomen is soft.  Musculoskeletal:         General: No swelling or tenderness. Normal range of motion.      Skin:     General: Skin is warm and dry.   Neurological:      General: No focal deficit present.      Mental Status: She is alert and oriented to person, place, and time. Mental status is at baseline.   Psychiatric:         Mood and Affect: Mood normal.         Behavior: Behavior normal.              Assessment/Plan   Chest " pain  Elevated trop, NSTEMI  Abnormal EKG, prior septal infarct   IDDM  PNA    Plan  Continue ACS protocol, EULALIO heparin, monitor signs of bleeding  Continue ASA  Recommend loading with Brilinta   Continue HI statin  Continue BB  Recommend lipid panel  Recommend echo  Will plan for LHC tomorrow, keep npo after midnight          Code Status:  DNR and No Intubation    I spent  minutes in the professional and overall care of this patient.        High complex      Aneesh Hay MD

## 2024-05-19 NOTE — CONSULTS
Inpatient consult to Endocrinology  Consult performed by: Ramesh Diaz MD  Consult ordered by: Paris Hernandez MD      Reason For Consult  Diabetes    History Of Present Illness  Azucena Hagan is a 53 y.o. female admitted in transfer from [facility] with NSTEMI, nausea/vomiting, pneumonia.     Duration of type 2 diabetes mellitus:  27 years  Complications:  CAD    Insulin glargine 35 units last night    Home regimen:  Insulin Glargine 25 units at bedtime.      Allergic symptoms pruritus on insulin aspart, resolved off aspart  History of fluid retention on Novolog Flexpen     FreeStyle [place] 2  Patient is testing glucose 288 times daily  [place] reviewed     Glucose has been persistently high    Medications    Current Facility-Administered Medications:     ALPRAZolam (Xanax) tablet 1 mg, 1 mg, oral, TID PRN, Paris Hernandez MD    aspirin chewable tablet 81 mg, 81 mg, oral, Daily, Paris Hernandez MD, 81 mg at 05/19/24 0903    atorvastatin (Lipitor) tablet 40 mg, 40 mg, oral, Nightly, Paris Hernandez MD    cyclobenzaprine (Flexeril) tablet 10 mg, 10 mg, oral, TID PRN, Paris Hernandez MD    dextrose 50 % injection 12.5 g, 12.5 g, intravenous, q15 min PRN, Paris Hernandez MD    dextrose 50 % injection 25 g, 25 g, intravenous, q15 min PRN, Paris Hernandez MD    glucagon (Glucagen) injection 1 mg, 1 mg, intramuscular, q15 min PRN, Paris Hernandez MD    glucagon (Glucagen) injection 1 mg, 1 mg, intramuscular, q15 min PRN, Paris Hernandez MD    heparin 25,000 Units in dextrose 5% 250 mL (100 Units/mL) infusion (premix), 0-4,000 Units/hr, intravenous, Continuous, Paris Hernandez MD, Last Rate: 12 mL/hr at 05/19/24 0447, 1,200 Units/hr at 05/19/24 0447    heparin bolus from bag 1,500-3,000 Units, 1,500-3,000 Units, intravenous, q4h PRN, Paris Hernandez MD    insulin glargine (Lantus) injection 35 Units, 35 Units, subcutaneous, q PM, Ramesh Diaz MD, 35 Units at 05/18/24 2108    insulin lispro (HumaLOG) injection 0-15 Units, 0-15 Units,  subcutaneous, q4h, Ramesh Diaz MD, 4 Units at 05/19/24 0929    levothyroxine (Synthroid, Levoxyl) tablet 100 mcg, 100 mcg, oral, Daily before breakfast, Paris Hernandez MD, 100 mcg at 05/19/24 0639    metoclopramide (Reglan) injection 10 mg, 10 mg, intravenous, q6h PRN, Paris Hernandez MD, 10 mg at 05/18/24 1539    metoprolol tartrate (Lopressor) tablet 25 mg, 25 mg, oral, BID, Paris Hernandez MD, 25 mg at 05/19/24 0903    piperacillin-tazobactam-dextrose (Zosyn) IV 3.375 g, 3.375 g, intravenous, q6h, Paris Hernandez MD, Stopped at 05/19/24 0513    sodium chloride 0.9% infusion, 100 mL/hr, intravenous, Continuous, Paris Hernandez MD, Last Rate: 100 mL/hr at 05/19/24 0244, 100 mL/hr at 05/19/24 0244    temazepam (Restoril) capsule 30 mg, 30 mg, oral, Nightly PRN, Paris Hernandez MD    topiramate (Topamax) tablet 150 mg, 150 mg, oral, Nightly, Paris Hernandez MD    topiramate (Topamax) tablet 200 mg, 200 mg, oral, Daily, Paris Hernandez MD, 200 mg at 05/19/24 0903     Past Medical History  She has a past medical history of Anxiety, Candidiasis, unspecified (12/27/2017), Disease of thyroid gland, Dorsalgia, unspecified (04/23/2015), Gastro-esophageal reflux disease without esophagitis (04/21/2021), Generalized anxiety disorder (10/05/2022), Hypothyroidism, unspecified (11/17/2022), Ocular pain, left eye (06/06/2021), Other conditions influencing health status (11/16/2022), Otitis media, unspecified, unspecified ear (03/19/2018), Personal history of other diseases of the circulatory system, Personal history of other diseases of the respiratory system (01/29/2018), Personal history of other infectious and parasitic diseases (08/13/2018), Personal history of other infectious and parasitic diseases (11/17/2015), Personal history of other specified conditions, Personal history of other specified conditions (03/12/2019), Personal history of other specified conditions (07/10/2014), Personal history of other specified conditions  "(08/08/2018), Pure hypercholesterolemia, unspecified (02/16/2022), Radiculopathy, lumbar region (05/20/2016), Tachycardia, unspecified (02/16/2022), and Troponin level elevated (01/19/2024).    Surgical History  She has a past surgical history that includes Tonsillectomy (03/04/2014); Tubal ligation (08/26/2014); and Mouth surgery (08/26/2014).     Social History  She reports that she has never smoked. She has never been exposed to tobacco smoke. She has never used smokeless tobacco. She reports that she does not drink alcohol and does not use drugs.    Family History  Family History   Problem Relation Name Age of Onset    Anxiety disorder Mother Karolyn Terrell     Other (back injury) Mother Karolyn Terrell     Depression Mother Karolyn Terrell         recurrent    Other (cardiac disorder) Father      Heart attack Father      Anxiety disorder Brother Wild     Hypertension Brother Wild     Other (king disease) Brother Wild        Allergies  Nut - unspecified, Pecan nut, Cephalexin, House dust, Hydrocodone-acetaminophen, Insulin aspart, Insulin regular, Nickel, Red dye, Statins-hmg-coa reductase inhibitors, Tree and shrub pollen, and Venom-wasp    Review of Systems   Constitutional:  Negative for unexpected weight change.   Eyes:  Negative for visual disturbance.   Respiratory:  Positive for cough. Negative for shortness of breath.    Cardiovascular:  Positive for chest pain.   Gastrointestinal:  Positive for abdominal pain, nausea and vomiting.   Endocrine:        As above         Last Recorded Vitals  Blood pressure 125/81, pulse 97, temperature 36.5 °C (97.7 °F), temperature source Temporal, resp. rate 16, height 1.575 m (5' 2.01\"), weight 54 kg (119 lb), SpO2 99%.    Physical Exam  Constitutional:       General: She is not in acute distress.  HENT:      Head: Normocephalic.      Mouth/Throat:      Mouth: Mucous membranes are moist.   Eyes:      Extraocular Movements: Extraocular movements intact.   Neck:      " Thyroid: No thyroid mass or thyromegaly.   Cardiovascular:      Pulses:           Radial pulses are 2+ on the right side and 2+ on the left side.   Musculoskeletal:      Right lower leg: No edema.      Left lower leg: No edema.   Lymphadenopathy:      Cervical: No cervical adenopathy.   Neurological:      Mental Status: She is alert.      Motor: No tremor.   Psychiatric:         Mood and Affect: Affect normal.          Relevant Results  Lab Results   Component Value Date    POCGLU 181 (H) 05/19/2024    POCGLU 163 (H) 05/19/2024    POCGLU 121 (H) 05/19/2024    POCGLU 267 (H) 05/18/2024    POCGLU 301 (H) 05/18/2024    GLUCOSE 177 (H) 05/19/2024    GLUCOSE 304 (H) 05/18/2024    GLUCOSE 317 (H) 05/18/2024    GLUCOSE 136 (H) 11/22/2023    GLUCOSE 236 (H) 11/20/2023      Latest Reference Range & Units 05/19/24 05:54   GLUCOSE 74 - 99 mg/dL 177 (H)   SODIUM 136 - 145 mmol/L 137   POTASSIUM 3.5 - 5.3 mmol/L 2.8 (LL)   CHLORIDE 98 - 107 mmol/L 101   Bicarbonate 21 - 32 mmol/L 25   Anion Gap 10 - 20 mmol/L 14   Blood Urea Nitrogen 6 - 23 mg/dL 19   Creatinine 0.50 - 1.05 mg/dL 0.80   EGFR >60 mL/min/1.73m*2 89   Calcium 8.6 - 10.3 mg/dL 8.2 (L)   Albumin 3.4 - 5.0 g/dL 3.3 (L)   Alkaline Phosphatase 33 - 110 U/L 68   ALT 7 - 45 U/L 52 (H)   AST 9 - 39 U/L 33   Bilirubin Total 0.0 - 1.2 mg/dL 0.7   HDL CHOLESTEROL mg/dL 46.7   Cholesterol/HDL Ratio  3.6   LDL Calculated <=99 mg/dL 102 (H)   VLDL 0 - 40 mg/dL 20   TRIGLYCERIDES 0 - 149 mg/dL 101   Non HDL Cholesterol 0 - 149 mg/dL 122   Total Protein 6.4 - 8.2 g/dL 6.2 (L)   MAGNESIUM 1.60 - 2.40 mg/dL 1.66   CHOLESTEROL 0 - 199 mg/dL 169   Troponin I, High Sensitivity 0 - 13 ng/L 1,170 (HH)         IMPRESSION:  TYPE 2 DIABETES MELLITUS WITH HYPERGLYCEMIA  LONG TERM CURRENT INSULIN USE  Chronically poor glucose control on basal insulin only  Patient presented with ketonuria and elevated anion gap, DKA averted  History of DKA least year following months of insulin  omission  Stated allergy to aspart (Novolog)    RECOMMENDATIONS  Continue glargine 35 units at bedtime  Lispro scale, change to QAC.  She may need prandial lispro at discharge      Ramesh Diaz MD

## 2024-05-20 LAB
ALBUMIN SERPL BCP-MCNC: 2.9 G/DL (ref 3.4–5)
ALP SERPL-CCNC: 71 U/L (ref 33–110)
ALT SERPL W P-5'-P-CCNC: 46 U/L (ref 7–45)
ANION GAP SERPL CALC-SCNC: 14 MMOL/L (ref 10–20)
AST SERPL W P-5'-P-CCNC: 32 U/L (ref 9–39)
B-HCG SERPL-ACNC: <2 MIU/ML
BASOPHILS # BLD AUTO: 0.03 X10*3/UL (ref 0–0.1)
BASOPHILS NFR BLD AUTO: 0.2 %
BILIRUB SERPL-MCNC: 0.7 MG/DL (ref 0–1.2)
BUN SERPL-MCNC: 14 MG/DL (ref 6–23)
CALCIUM SERPL-MCNC: 7.6 MG/DL (ref 8.6–10.3)
CARDIAC TROPONIN I PNL SERPL HS: 644 NG/L (ref 0–13)
CHLORIDE SERPL-SCNC: 103 MMOL/L (ref 98–107)
CO2 SERPL-SCNC: 23 MMOL/L (ref 21–32)
CREAT SERPL-MCNC: 0.86 MG/DL (ref 0.5–1.05)
EGFRCR SERPLBLD CKD-EPI 2021: 82 ML/MIN/1.73M*2
EOSINOPHIL # BLD AUTO: 0.02 X10*3/UL (ref 0–0.7)
EOSINOPHIL NFR BLD AUTO: 0.2 %
ERYTHROCYTE [DISTWIDTH] IN BLOOD BY AUTOMATED COUNT: 12.5 % (ref 11.5–14.5)
GLUCOSE SERPL-MCNC: 104 MG/DL (ref 74–99)
HCT VFR BLD AUTO: 35.3 % (ref 36–46)
HGB BLD-MCNC: 12.2 G/DL (ref 12–16)
HOLD SPECIMEN: NORMAL
IMM GRANULOCYTES # BLD AUTO: 0.15 X10*3/UL (ref 0–0.7)
IMM GRANULOCYTES NFR BLD AUTO: 1.1 % (ref 0–0.9)
LEGIONELLA AG UR QL: NEGATIVE
LYMPHOCYTES # BLD AUTO: 3.07 X10*3/UL (ref 1.2–4.8)
LYMPHOCYTES NFR BLD AUTO: 23.4 %
MAGNESIUM SERPL-MCNC: 1.59 MG/DL (ref 1.6–2.4)
MCH RBC QN AUTO: 30 PG (ref 26–34)
MCHC RBC AUTO-ENTMCNC: 34.6 G/DL (ref 32–36)
MCV RBC AUTO: 87 FL (ref 80–100)
MONOCYTES # BLD AUTO: 0.8 X10*3/UL (ref 0.1–1)
MONOCYTES NFR BLD AUTO: 6.1 %
NEUTROPHILS # BLD AUTO: 9.06 X10*3/UL (ref 1.2–7.7)
NEUTROPHILS NFR BLD AUTO: 69 %
NRBC BLD-RTO: 0 /100 WBCS (ref 0–0)
PLATELET # BLD AUTO: 353 X10*3/UL (ref 150–450)
POTASSIUM SERPL-SCNC: 2.5 MMOL/L (ref 3.5–5.3)
POTASSIUM SERPL-SCNC: 2.7 MMOL/L (ref 3.5–5.3)
POTASSIUM SERPL-SCNC: 3.6 MMOL/L (ref 3.5–5.3)
PROCALCITONIN SERPL-MCNC: 1.31 NG/ML
PROT SERPL-MCNC: 5.5 G/DL (ref 6.4–8.2)
RBC # BLD AUTO: 4.07 X10*6/UL (ref 4–5.2)
S PNEUM AG UR QL: NEGATIVE
SODIUM SERPL-SCNC: 137 MMOL/L (ref 136–145)
UFH PPP CHRO-ACNC: 0.5 IU/ML
WBC # BLD AUTO: 13.1 X10*3/UL (ref 4.4–11.3)

## 2024-05-20 PROCEDURE — 83735 ASSAY OF MAGNESIUM: CPT | Performed by: STUDENT IN AN ORGANIZED HEALTH CARE EDUCATION/TRAINING PROGRAM

## 2024-05-20 PROCEDURE — 76942 ECHO GUIDE FOR BIOPSY: CPT | Performed by: INTERNAL MEDICINE

## 2024-05-20 PROCEDURE — 99231 SBSQ HOSP IP/OBS SF/LOW 25: CPT | Performed by: INTERNAL MEDICINE

## 2024-05-20 PROCEDURE — 2500000006 HC RX 250 W HCPCS SELF ADMINISTERED DRUGS (ALT 637 FOR ALL PAYERS): Performed by: STUDENT IN AN ORGANIZED HEALTH CARE EDUCATION/TRAINING PROGRAM

## 2024-05-20 PROCEDURE — 2550000001 HC RX 255 CONTRASTS: Performed by: INTERNAL MEDICINE

## 2024-05-20 PROCEDURE — 2500000002 HC RX 250 W HCPCS SELF ADMINISTERED DRUGS (ALT 637 FOR MEDICARE OP, ALT 636 FOR OP/ED): Performed by: INTERNAL MEDICINE

## 2024-05-20 PROCEDURE — C1760 CLOSURE DEV, VASC: HCPCS | Performed by: INTERNAL MEDICINE

## 2024-05-20 PROCEDURE — 93458 L HRT ARTERY/VENTRICLE ANGIO: CPT | Performed by: INTERNAL MEDICINE

## 2024-05-20 PROCEDURE — 2500000005 HC RX 250 GENERAL PHARMACY W/O HCPCS: Performed by: INTERNAL MEDICINE

## 2024-05-20 PROCEDURE — 7100000009 HC PHASE TWO TIME - INITIAL BASE CHARGE: Performed by: INTERNAL MEDICINE

## 2024-05-20 PROCEDURE — 2500000006 HC RX 250 W HCPCS SELF ADMINISTERED DRUGS (ALT 637 FOR ALL PAYERS): Performed by: INTERNAL MEDICINE

## 2024-05-20 PROCEDURE — 84132 ASSAY OF SERUM POTASSIUM: CPT | Performed by: INTERNAL MEDICINE

## 2024-05-20 PROCEDURE — 84702 CHORIONIC GONADOTROPIN TEST: CPT | Performed by: PHYSICIAN ASSISTANT

## 2024-05-20 PROCEDURE — 99152 MOD SED SAME PHYS/QHP 5/>YRS: CPT | Performed by: INTERNAL MEDICINE

## 2024-05-20 PROCEDURE — 2500000004 HC RX 250 GENERAL PHARMACY W/ HCPCS (ALT 636 FOR OP/ED): Performed by: STUDENT IN AN ORGANIZED HEALTH CARE EDUCATION/TRAINING PROGRAM

## 2024-05-20 PROCEDURE — 7100000010 HC PHASE TWO TIME - EACH INCREMENTAL 1 MINUTE: Performed by: INTERNAL MEDICINE

## 2024-05-20 PROCEDURE — 2720000007 HC OR 272 NO HCPCS: Performed by: INTERNAL MEDICINE

## 2024-05-20 PROCEDURE — 84484 ASSAY OF TROPONIN QUANT: CPT | Performed by: PHYSICIAN ASSISTANT

## 2024-05-20 PROCEDURE — 2060000001 HC INTERMEDIATE ICU ROOM DAILY

## 2024-05-20 PROCEDURE — C1894 INTRO/SHEATH, NON-LASER: HCPCS | Performed by: INTERNAL MEDICINE

## 2024-05-20 PROCEDURE — 36415 COLL VENOUS BLD VENIPUNCTURE: CPT | Performed by: STUDENT IN AN ORGANIZED HEALTH CARE EDUCATION/TRAINING PROGRAM

## 2024-05-20 PROCEDURE — 2500000004 HC RX 250 GENERAL PHARMACY W/ HCPCS (ALT 636 FOR OP/ED): Performed by: INTERNAL MEDICINE

## 2024-05-20 PROCEDURE — 85025 COMPLETE CBC W/AUTO DIFF WBC: CPT | Performed by: STUDENT IN AN ORGANIZED HEALTH CARE EDUCATION/TRAINING PROGRAM

## 2024-05-20 PROCEDURE — 4A023N7 MEASUREMENT OF CARDIAC SAMPLING AND PRESSURE, LEFT HEART, PERCUTANEOUS APPROACH: ICD-10-PCS | Performed by: INTERNAL MEDICINE

## 2024-05-20 PROCEDURE — 85520 HEPARIN ASSAY: CPT | Performed by: STUDENT IN AN ORGANIZED HEALTH CARE EDUCATION/TRAINING PROGRAM

## 2024-05-20 PROCEDURE — 84132 ASSAY OF SERUM POTASSIUM: CPT | Performed by: STUDENT IN AN ORGANIZED HEALTH CARE EDUCATION/TRAINING PROGRAM

## 2024-05-20 PROCEDURE — 2500000001 HC RX 250 WO HCPCS SELF ADMINISTERED DRUGS (ALT 637 FOR MEDICARE OP): Performed by: STUDENT IN AN ORGANIZED HEALTH CARE EDUCATION/TRAINING PROGRAM

## 2024-05-20 PROCEDURE — C1887 CATHETER, GUIDING: HCPCS | Performed by: INTERNAL MEDICINE

## 2024-05-20 PROCEDURE — B2111ZZ FLUOROSCOPY OF MULTIPLE CORONARY ARTERIES USING LOW OSMOLAR CONTRAST: ICD-10-PCS | Performed by: INTERNAL MEDICINE

## 2024-05-20 PROCEDURE — 83036 HEMOGLOBIN GLYCOSYLATED A1C: CPT | Mod: GEALAB | Performed by: FAMILY MEDICINE

## 2024-05-20 PROCEDURE — 99233 SBSQ HOSP IP/OBS HIGH 50: CPT | Performed by: STUDENT IN AN ORGANIZED HEALTH CARE EDUCATION/TRAINING PROGRAM

## 2024-05-20 PROCEDURE — 99232 SBSQ HOSP IP/OBS MODERATE 35: CPT | Performed by: PHYSICIAN ASSISTANT

## 2024-05-20 RX ORDER — POTASSIUM CHLORIDE 1.5 G/1.58G
60 POWDER, FOR SOLUTION ORAL ONCE
Status: DISCONTINUED | OUTPATIENT
Start: 2024-05-20 | End: 2024-05-20

## 2024-05-20 RX ORDER — POTASSIUM CHLORIDE 14.9 MG/ML
20 INJECTION INTRAVENOUS ONCE
Status: COMPLETED | OUTPATIENT
Start: 2024-05-20 | End: 2024-05-20

## 2024-05-20 RX ORDER — INSULIN GLARGINE 100 [IU]/ML
20 INJECTION, SOLUTION SUBCUTANEOUS EVERY EVENING
Status: DISCONTINUED | OUTPATIENT
Start: 2024-05-20 | End: 2024-05-24

## 2024-05-20 RX ORDER — POTASSIUM CHLORIDE 20 MEQ/1
60 TABLET, EXTENDED RELEASE ORAL ONCE
Status: COMPLETED | OUTPATIENT
Start: 2024-05-20 | End: 2024-05-20

## 2024-05-20 RX ORDER — ACETAMINOPHEN 160 MG/5ML
650 SOLUTION ORAL EVERY 4 HOURS PRN
Status: DISCONTINUED | OUTPATIENT
Start: 2024-05-20 | End: 2024-05-24 | Stop reason: HOSPADM

## 2024-05-20 RX ORDER — HEPARIN SODIUM 1000 [USP'U]/ML
INJECTION, SOLUTION INTRAVENOUS; SUBCUTANEOUS AS NEEDED
Status: DISCONTINUED | OUTPATIENT
Start: 2024-05-20 | End: 2024-05-20 | Stop reason: HOSPADM

## 2024-05-20 RX ORDER — ACETAMINOPHEN 325 MG/1
650 TABLET ORAL EVERY 4 HOURS PRN
Status: DISCONTINUED | OUTPATIENT
Start: 2024-05-20 | End: 2024-05-24 | Stop reason: HOSPADM

## 2024-05-20 RX ORDER — FENTANYL CITRATE 50 UG/ML
INJECTION, SOLUTION INTRAMUSCULAR; INTRAVENOUS AS NEEDED
Status: DISCONTINUED | OUTPATIENT
Start: 2024-05-20 | End: 2024-05-20 | Stop reason: HOSPADM

## 2024-05-20 RX ORDER — NITROGLYCERIN 40 MG/100ML
INJECTION INTRAVENOUS AS NEEDED
Status: DISCONTINUED | OUTPATIENT
Start: 2024-05-20 | End: 2024-05-20 | Stop reason: HOSPADM

## 2024-05-20 RX ORDER — MIDAZOLAM HYDROCHLORIDE 1 MG/ML
INJECTION, SOLUTION INTRAMUSCULAR; INTRAVENOUS AS NEEDED
Status: DISCONTINUED | OUTPATIENT
Start: 2024-05-20 | End: 2024-05-20 | Stop reason: HOSPADM

## 2024-05-20 RX ORDER — SODIUM CHLORIDE 9 MG/ML
INJECTION, SOLUTION INTRAVENOUS CONTINUOUS PRN
Status: COMPLETED | OUTPATIENT
Start: 2024-05-20 | End: 2024-05-20

## 2024-05-20 RX ORDER — MAGNESIUM SULFATE HEPTAHYDRATE 40 MG/ML
2 INJECTION, SOLUTION INTRAVENOUS ONCE
Status: COMPLETED | OUTPATIENT
Start: 2024-05-20 | End: 2024-05-20

## 2024-05-20 RX ORDER — ACETAMINOPHEN 650 MG/1
650 SUPPOSITORY RECTAL EVERY 4 HOURS PRN
Status: DISCONTINUED | OUTPATIENT
Start: 2024-05-20 | End: 2024-05-24 | Stop reason: HOSPADM

## 2024-05-20 RX ORDER — SODIUM CHLORIDE 9 MG/ML
3 INJECTION, SOLUTION INTRAVENOUS CONTINUOUS
Status: ACTIVE | OUTPATIENT
Start: 2024-05-20 | End: 2024-05-20

## 2024-05-20 RX ORDER — IODIXANOL 320 MG/ML
INJECTION, SOLUTION INTRAVASCULAR AS NEEDED
Status: DISCONTINUED | OUTPATIENT
Start: 2024-05-20 | End: 2024-05-20 | Stop reason: HOSPADM

## 2024-05-20 RX ADMIN — LEVOTHYROXINE SODIUM 100 MCG: 100 TABLET ORAL at 05:13

## 2024-05-20 RX ADMIN — ALPRAZOLAM 1 MG: 0.5 TABLET ORAL at 23:16

## 2024-05-20 RX ADMIN — MAGNESIUM SULFATE HEPTAHYDRATE 2 G: 2 INJECTION, SOLUTION INTRAVENOUS at 11:06

## 2024-05-20 RX ADMIN — SODIUM CHLORIDE 100 ML/HR: 9 INJECTION, SOLUTION INTRAVENOUS at 00:39

## 2024-05-20 RX ADMIN — POTASSIUM CHLORIDE 60 MEQ: 1500 TABLET, EXTENDED RELEASE ORAL at 09:01

## 2024-05-20 RX ADMIN — PIPERACILLIN SODIUM AND TAZOBACTAM SODIUM 3.38 G: 3; .375 INJECTION, SOLUTION INTRAVENOUS at 16:41

## 2024-05-20 RX ADMIN — POTASSIUM CHLORIDE 20 MEQ: 14.9 INJECTION, SOLUTION INTRAVENOUS at 09:01

## 2024-05-20 RX ADMIN — METOPROLOL TARTRATE 25 MG: 25 TABLET, FILM COATED ORAL at 21:52

## 2024-05-20 RX ADMIN — PIPERACILLIN SODIUM AND TAZOBACTAM SODIUM 3.38 G: 3; .375 INJECTION, SOLUTION INTRAVENOUS at 05:04

## 2024-05-20 RX ADMIN — HEPARIN SODIUM 1200 UNITS/HR: 10000 INJECTION, SOLUTION INTRAVENOUS at 00:38

## 2024-05-20 RX ADMIN — PIPERACILLIN SODIUM AND TAZOBACTAM SODIUM 3.38 G: 3; .375 INJECTION, SOLUTION INTRAVENOUS at 11:06

## 2024-05-20 RX ADMIN — ACETAMINOPHEN 650 MG: 325 TABLET ORAL at 23:08

## 2024-05-20 RX ADMIN — METOPROLOL TARTRATE 25 MG: 25 TABLET, FILM COATED ORAL at 09:01

## 2024-05-20 RX ADMIN — PIPERACILLIN SODIUM AND TAZOBACTAM SODIUM 3.38 G: 3; .375 INJECTION, SOLUTION INTRAVENOUS at 21:48

## 2024-05-20 RX ADMIN — POTASSIUM CHLORIDE 60 MEQ: 1500 TABLET, EXTENDED RELEASE ORAL at 02:26

## 2024-05-20 RX ADMIN — SODIUM CHLORIDE 3 ML/KG/HR: 9 INJECTION, SOLUTION INTRAVENOUS at 15:55

## 2024-05-20 RX ADMIN — ASPIRIN 81 MG 81 MG: 81 TABLET ORAL at 09:01

## 2024-05-20 RX ADMIN — INSULIN GLARGINE 20 UNITS: 100 INJECTION, SOLUTION SUBCUTANEOUS at 23:08

## 2024-05-20 RX ADMIN — TOPIRAMATE 200 MG: 25 TABLET, FILM COATED ORAL at 09:01

## 2024-05-20 ASSESSMENT — COGNITIVE AND FUNCTIONAL STATUS - GENERAL
DAILY ACTIVITIY SCORE: 15
DRESSING REGULAR UPPER BODY CLOTHING: A LITTLE
EATING MEALS: A LITTLE
CLIMB 3 TO 5 STEPS WITH RAILING: A LITTLE
MOVING FROM LYING ON BACK TO SITTING ON SIDE OF FLAT BED WITH BEDRAILS: A LITTLE
PERSONAL GROOMING: A LITTLE
DRESSING REGULAR LOWER BODY CLOTHING: A LOT
MOBILITY SCORE: 18
DRESSING REGULAR UPPER BODY CLOTHING: A LITTLE
PERSONAL GROOMING: A LITTLE
MOVING FROM LYING ON BACK TO SITTING ON SIDE OF FLAT BED WITH BEDRAILS: A LITTLE
DAILY ACTIVITIY SCORE: 15
CLIMB 3 TO 5 STEPS WITH RAILING: A LITTLE
DAILY ACTIVITIY SCORE: 15
STANDING UP FROM CHAIR USING ARMS: A LITTLE
EATING MEALS: A LITTLE
WALKING IN HOSPITAL ROOM: A LITTLE
WALKING IN HOSPITAL ROOM: A LITTLE
TURNING FROM BACK TO SIDE WHILE IN FLAT BAD: A LITTLE
DRESSING REGULAR LOWER BODY CLOTHING: A LOT
MOVING TO AND FROM BED TO CHAIR: A LITTLE
TURNING FROM BACK TO SIDE WHILE IN FLAT BAD: A LITTLE
EATING MEALS: A LITTLE
DRESSING REGULAR UPPER BODY CLOTHING: A LITTLE
STANDING UP FROM CHAIR USING ARMS: A LITTLE
HELP NEEDED FOR BATHING: A LOT
STANDING UP FROM CHAIR USING ARMS: A LITTLE
DRESSING REGULAR LOWER BODY CLOTHING: A LOT
MOVING TO AND FROM BED TO CHAIR: A LITTLE
TOILETING: A LOT
MOBILITY SCORE: 18
MOBILITY SCORE: 18
HELP NEEDED FOR BATHING: A LOT
TURNING FROM BACK TO SIDE WHILE IN FLAT BAD: A LITTLE
TOILETING: A LOT
HELP NEEDED FOR BATHING: A LOT
WALKING IN HOSPITAL ROOM: A LITTLE
TOILETING: A LOT
CLIMB 3 TO 5 STEPS WITH RAILING: A LITTLE
MOVING TO AND FROM BED TO CHAIR: A LITTLE
PERSONAL GROOMING: A LITTLE
MOVING FROM LYING ON BACK TO SITTING ON SIDE OF FLAT BED WITH BEDRAILS: A LITTLE

## 2024-05-20 ASSESSMENT — PAIN - FUNCTIONAL ASSESSMENT
PAIN_FUNCTIONAL_ASSESSMENT: 0-10

## 2024-05-20 ASSESSMENT — PAIN SCALES - GENERAL
PAINLEVEL_OUTOF10: 0 - NO PAIN
PAINLEVEL_OUTOF10: 7
PAINLEVEL_OUTOF10: 0 - NO PAIN

## 2024-05-20 ASSESSMENT — PAIN DESCRIPTION - LOCATION: LOCATION: BACK

## 2024-05-20 NOTE — PROGRESS NOTES
Azucena Hagan is a 53 y.o. female on day 2 of admission presenting with NSTEMI (non-ST elevated myocardial infarction) (Multi).    Subjective   Interval History: no fever, abdominal pain, no emesis        Review of Systems    Objective   Range of Vitals (last 24 hours)  Heart Rate:  [88]   Temp:  [36.6 °C (97.8 °F)-37.9 °C (100.3 °F)]   BP: (119-133)/(76-81)   SpO2:  [98 %-100 %]   Daily Weight  05/18/24 : 54 kg (119 lb)    Body mass index is 21.76 kg/m².    Physical Exam  Constitutional:       Appearance: Normal appearance.   HENT:      Head: Normocephalic and atraumatic.      Mouth/Throat:      Mouth: Mucous membranes are moist.      Pharynx: Oropharynx is clear.   Eyes:      Pupils: Pupils are equal, round, and reactive to light.   Cardiovascular:      Rate and Rhythm: Normal rate and regular rhythm.      Heart sounds: Normal heart sounds.   Pulmonary:      Effort: Pulmonary effort is normal.      Breath sounds: Normal breath sounds.   Abdominal:      General: Abdomen is flat. Bowel sounds are normal.      Palpations: Abdomen is soft.      Tenderness: There is abdominal tenderness.   Musculoskeletal:      Cervical back: Normal range of motion.   Neurological:      Mental Status: She is alert.         Antibiotics  ALPRAZolam (Xanax) tablet 1 mg  cyclobenzaprine (Flexeril) tablet 10 mg  levothyroxine (Synthroid, Levoxyl) tablet 100 mcg  temazepam (Restoril) capsule 30 mg  topiramate (Topamax) tablet 150 mg  insulin glargine (Lantus) injection 35 Units  glucagon (Glucagen) injection 1 mg  dextrose 50 % injection 25 g  glucagon (Glucagen) injection 1 mg  dextrose 50 % injection 12.5 g  insulin lispro (HumaLOG) injection 0-15 Units  heparin bolus from bag 3,102 Units  heparin 25,000 Units in dextrose 5% 250 mL (100 Units/mL) infusion (premix)  heparin bolus from bag 1,500-3,000 Units  metoprolol tartrate (Lopressor) injection 5 mg  piperacillin-tazobactam-dextrose (Zosyn) IV 3.375 g  metoclopramide (Reglan)  injection 10 mg  sodium chloride 0.9% infusion  topiramate (Topamax) tablet 200 mg  atorvastatin (Lipitor) tablet 40 mg  aspirin chewable tablet 81 mg  metoprolol tartrate (Lopressor) tablet 25 mg  insulin glargine (Lantus) injection 35 Units  insulin lispro (HumaLOG) injection 0-15 Units  potassium chloride CR (Klor-Con M20) ER tablet 40 mEq  insulin lispro (HumaLOG) injection 0-15 Units  LORazepam (Ativan) injection  - Om[employer]ell Override Pull  LORazepam (Ativan) injection 1 mg  dextrose 50 % injection 25 g  insulin glargine (Lantus) injection 20 Units  potassium chloride (Klor-Con) packet 60 mEq  potassium chloride CR (Klor-Con M20) ER tablet 60 mEq  potassium chloride CR (Klor-Con M20) ER tablet 60 mEq  potassium chloride 20 mEq in 100 mL IV premix  magnesium sulfate IV 2 g      Relevant Results  Labs  Results from last 72 hours   Lab Units 05/20/24  0044 05/19/24  0554 05/18/24  1522 05/18/24  0625   WBC AUTO x10*3/uL 13.1* 11.1 9.7 10.8   HEMOGLOBIN g/dL 12.2 10.6* 10.7* 12.4   HEMATOCRIT % 35.3* 30.9* 33.3* 37.1   PLATELETS AUTO x10*3/uL 353 325 292 326   NEUTROS PCT AUTO % 69.0 78.6  --  86.0   LYMPHS PCT AUTO % 23.4 16.0  --  9.7   MONOS PCT AUTO % 6.1 3.9  --  3.3   EOS PCT AUTO % 0.2 0.0  --  0.0     Results from last 72 hours   Lab Units 05/20/24  0530 05/20/24  0044 05/19/24  0554 05/18/24  1522   SODIUM mmol/L 137  --  137 137   POTASSIUM mmol/L 2.7* 2.5* 2.8* 3.6   CHLORIDE mmol/L 103  --  101 96*   CO2 mmol/L 23  --  25 19*   BUN mg/dL 14  --  19 24*   CREATININE mg/dL 0.86  --  0.80 0.79   GLUCOSE mg/dL 104*  --  177* 304*   CALCIUM mg/dL 7.6*  --  8.2* 9.0   ANION GAP mmol/L 14  --  14 26*   EGFR mL/min/1.73m*2 82  --  89 >90     Results from last 72 hours   Lab Units 05/20/24  0530 05/19/24  0554 05/18/24  1522   ALK PHOS U/L 71 68 77   BILIRUBIN TOTAL mg/dL 0.7 0.7 0.5   PROTEIN TOTAL g/dL 5.5* 6.2* 7.1   ALT U/L 46* 52* 66*   AST U/L 32 33 53*   ALBUMIN g/dL 2.9* 3.3* 3.8     Estimated Creatinine  Clearance: 61.2 mL/min (by C-G formula based on SCr of 0.86 mg/dL).  C-Reactive Protein   Date Value Ref Range Status   05/19/2024 9.72 (H) <1.00 mg/dL Final   11/22/2023 0.30 <1.00 mg/dL Final     CRP   Date Value Ref Range Status   06/20/2023 0.56 mg/dL Final     Comment:     REF VALUE  < 1.00       Microbiology  reviewed  Imaging  reviewed        Assessment/Plan    Suspected aspiration pneumonia  Abdominal pain     Recommendations :  Continue Zosyn   Discussed with the medical team    I spent minutes in the professional and overall care of this patient.      Khari Williamson MD

## 2024-05-20 NOTE — CARE PLAN
The patient's goals for the shift include to not be nauseated    The clinical goals for the shift include Pt will sleep through the night

## 2024-05-20 NOTE — PROGRESS NOTES
05/20/24 0840   Discharge Planning   Living Arrangements Spouse/significant other   Support Systems Spouse/significant other   Assistance Needed A&0x3, ambulates with walker, uses wheelchair PRN, has BSC, showerchair,  room air, no cpap or bipap, not active with any HHC, 2 story house and bedroom is on 2nd floor. Patient stays only on 2nd flooor, spouse works in the day, spouse brings food upstiars while he is at work.   Type of Residence Private residence   Number of Stairs to Enter Residence 5   Number of Stairs Within Residence 19   Do you have animals or pets at home? No   Who is requesting discharge planning? Provider   Home or Post Acute Services In home services   Type of Home Care Services Home nursing visits;Home OT;Home PT   Patient expects to be discharged to: Home with new HC(Sn, PT, OT). MD to send internal referral for HHC.   Does the patient need discharge transport arranged? No   Patient Choice   Provider Choice list and CMS website (https://medicare.gov/care-compare#search) for post-acute Quality and Resource Measure Data were provided and reviewed with: Patient;Family   Patient / Family choosing to utilize agency / facility established prior to hospitalization No

## 2024-05-20 NOTE — PROGRESS NOTES
"Azucena Hagan is a 53 y.o. female on day 2 of admission presenting with NSTEMI (non-ST elevated myocardial infarction) (Multi).    Subjective   Glargine decreased to 20 units last night  Patient NPO  Patient confused     Scheduled medications  aspirin, 81 mg, oral, Daily  atorvastatin, 40 mg, oral, Nightly  [Held by provider] insulin glargine, 35 Units, subcutaneous, q PM  insulin lispro, 0-15 Units, subcutaneous, TID  levothyroxine, 100 mcg, oral, Daily before breakfast  LORazepam, 1 mg, intravenous, Once  metoprolol tartrate, 25 mg, oral, BID  piperacillin-tazobactam, 3.375 g, intravenous, q6h  potassium chloride CR, 60 mEq, oral, Once  topiramate, 150 mg, oral, Nightly  topiramate, 200 mg, oral, Daily      Continuous medications  heparin, 0-4,000 Units/hr, Last Rate: 1,200 Units/hr (05/20/24 0645)  sodium chloride 0.9%, 100 mL/hr, Last Rate: 100 mL/hr (05/20/24 0516)      Objective   Physical Exam  Constitutional:       General: She is not in acute distress.  Neurological:      Mental Status: She is alert.         Last Recorded Vitals  Blood pressure 133/81, pulse 88, temperature 36.6 °C (97.8 °F), temperature source Temporal, resp. rate 16, height 1.575 m (5' 2.01\"), weight 54 kg (119 lb), SpO2 99%.  Intake/Output last 3 Shifts:  I/O last 3 completed shifts:  In: 4438.1 (82.2 mL/kg) [P.O.:120; I.V.:3968.1 (73.5 mL/kg); IV Piggyback:350]  Out: 150 (2.8 mL/kg) [Urine:150 (0.1 mL/kg/hr)]  Weight: 54 kg     Relevant Results  Results from last 7 days   Lab Units 05/20/24  0530 05/19/24  0810 05/19/24  0554 05/19/24  0439 05/19/24  0008 05/18/24  1949 05/18/24  1605 05/18/24  1522 05/18/24  0625   POCT GLUCOSE mg/dL  --  181*  --  163* 121* 267* 301*  --   --    GLUCOSE mg/dL 104*  --  177*  --   --   --   --  304* 317*      [facility] Reference Range & Units 05/20/24 05:30   GLUCOSE 74 - 99 mg/dL 104 (H)   SODIUM 136 - 145 mmol/L 137   POTASSIUM 3.5 - 5.3 mmol/L 2.7 (LL)   CHLORIDE 98 - 107 mmol/L 103   Bicarbonate 21 " - 32 mmol/L 23   Anion Gap 10 - 20 mmol/L 14   Blood Urea Nitrogen 6 - 23 mg/dL 14   Creatinine 0.50 - 1.05 mg/dL 0.86   EGFR >60 mL/min/1.73m*2 82   Calcium 8.6 - 10.3 mg/dL 7.6 (L)   Albumin 3.4 - 5.0 g/dL 2.9 (L)   Alkaline Phosphatase 33 - 110 U/L 71   ALT 7 - 45 U/L 46 (H)   AST 9 - 39 U/L 32   Bilirubin Total 0.0 - 1.2 mg/dL 0.7       Assessment/Plan   Principal Problem:    NSTEMI (non-ST elevated myocardial infarction) (Multi)      IMPRESSION:  TYPE 2 DIABETES MELLITUS WITH HYPERGLYCEMIA  LONG TERM CURRENT INSULIN USE  Chronically poor glucose control on basal insulin only  Stated allergy to aspart (Novolog)     RECOMMENDATIONS  Resume glargine 35 units at bedtime tonight  Lispro scale.  She may need prandial lispro at discharge      Ramesh Diaz MD

## 2024-05-20 NOTE — PROGRESS NOTES
Subjective Data:  Patient complains of feeling fatigued today, some shortness of breath. Chest pain present, mild in nature to L center of her chest.  Denies any palpitations, cough, shortness of breath, orthopnea, edema.      Overnight Events:    No acute issues reported overnight.     Objective Data:  Last Recorded Vitals:  Vitals:    05/19/24 1105 05/19/24 1513 05/19/24 2026 05/20/24 0513   BP: 125/79 119/76 133/81    BP Location: Right arm Right arm Left arm    Patient Position: Lying Lying Lying    Pulse:  88     Resp:       Temp: 36.8 °C (98.3 °F) 37.1 °C (98.8 °F) (!) 37.9 °C (100.3 °F) 36.6 °C (97.8 °F)   TempSrc: Temporal Temporal Temporal Temporal   SpO2: 97% 98% 100% 99%   Weight:       Height:           Last Labs:  CBC - 5/20/2024: 12:44 AM  13.1 12.2 353    35.3      CMP - 5/20/2024:  5:30 AM  7.6 5.5 32 --- 0.7   1.3; CANCELED 2.9 46 71      PTT - 5/18/2024:  6:25 AM  1.1   12.5 20     TROPHS   Date/Time Value Ref Range Status   05/19/2024 05:54 AM 1,170 0 - 13 ng/L Final   05/18/2024 03:22 PM 1,049 0 - 13 ng/L Final     Comment:     Previous result verified on 5/18/2024 0725 on specimen/case [id number] called with component TRPHS for procedure Troponin I, High Sensitivity, Initial with value 330 ng/L.   05/18/2024 08:16  0 - 13 ng/L Final     Comment:     Previous result verified on 5/18/2024 0725 on specimen/case [id number] called with component TRPHS for procedure Troponin I, High Sensitivity, Initial with value 330 ng/L.     HGBA1C   Date/Time Value Ref Range Status   10/24/2023 02:56 PM 9.5 4.2 - 6.5 % Final   06/21/2023 05:52 AM 13.6 % Final     Comment:          Diagnosis of Diabetes-Adults   Non-Diabetic: < or = 5.6%   Increased risk for developing diabetes: 5.7-6.4%   Diagnostic of diabetes: > or = 6.5%  .       Monitoring of Diabetes                Age (y)     Therapeutic Goal (%)   Adults:          >18           <7.0   Pediatrics:    13-18           <7.5                    "7-12           <8.0                   0- 6            7.5-8.5   American Diabetes Association. Diabetes Care 33(S1), Jan 2010.     06/20/2023 06:39 AM 13.8 % Final     Comment:          Diagnosis of Diabetes-Adults   Non-Diabetic: < or = 5.6%   Increased risk for developing diabetes: 5.7-6.4%   Diagnostic of diabetes: > or = 6.5%  .       Monitoring of Diabetes                Age (y)     Therapeutic Goal (%)   Adults:          >18           <7.0   Pediatrics:    13-18           <7.5                   7-12           <8.0                   0- 6            7.5-8.5   American Diabetes Association. Diabetes Care 33(S1), Jan 2010.       LDLCALC   Date/Time Value Ref Range Status   05/19/2024 05:54  <=99 mg/dL Final     Comment:                                 Near   Borderline      AGE      Desirable  Optimal    High     High     Very High     0-19 Y     0 - 109     ---    110-129   >/= 130     ----    20-24 Y     0 - 119     ---    120-159   >/= 160     ----      >24 Y     0 -  99   100-129  130-159   160-189     >/=190       VLDL   Date/Time Value Ref Range Status   05/19/2024 05:54 AM 20 0 - 40 mg/dL Final      Last I/O:  I/O last 3 completed shifts:  In: 4438.1 (82.2 mL/kg) [P.O.:120; I.V.:3968.1 (73.5 mL/kg); IV Piggyback:350]  Out: 150 (2.8 mL/kg) [Urine:150 (0.1 mL/kg/hr)]  Weight: 54 kg     Past Cardiology Tests (Last 3 Years):  EKG:  ECG 12 lead 05/18/2024 (Preliminary)  ECG 12 lead 05/18/2024 (Preliminary)  ECG 12 lead 05/18/2024 (Preliminary)  ECG 12 lead 12/05/2023 (Preliminary)  ECG 12 lead 12/05/2023 (Preliminary)  ECG 12 lead 12/04/2023 (Preliminary)    Echo:  Transthoracic Echocardiogram (05/20/24):       Ejection Fractions:  No results found for: \"EF\"  Cath:  No results found for this or any previous visit from the past 1095 days.    Stress Test:  No results found for this or any previous visit from the past 1095 days.    Imaging:  US RUQ (05/19/2024):   IMPRESSION:  No cholelithiasis or sonographic " evidence of acute cholecystitis.    CT Head w/o (05/18/2024):   IMPRESSION:  No CT evidence for acute intracranial pathology.    CT Abd/pelvis (05/18/2024):   IMPRESSION:  No CT evidence for acute pathology within the abdomen or pelvis.      CT Angio Chest for PE (05/18/20240:   IMPRESSION:  1. No pulmonary embolus.  2. Small patchy airspace opacity in the right lower lobe with  tree-in-bud opacities, and also present to a lesser degree in the  left lower lobe and lateral segment of the right middle lobe.  Findings are concerning for atypical or early multifocal pneumonia.  Aspiration could also have this appearance given the findings of  severe reflux. The esophagus is patulous with extensive fluid  throughout the esophagus.      CXR (05/18/2024):  IMPRESSION:  No acute cardiopulmonary process.      Inpatient Medications:  Scheduled medications   Medication Dose Route Frequency    aspirin  81 mg oral Daily    atorvastatin  40 mg oral Nightly    insulin glargine  35 Units subcutaneous q PM    insulin lispro  0-15 Units subcutaneous TID    levothyroxine  100 mcg oral Daily before breakfast    LORazepam  1 mg intravenous Once    metoprolol tartrate  25 mg oral BID    piperacillin-tazobactam  3.375 g intravenous q6h    potassium chloride  20 mEq intravenous Once    topiramate  150 mg oral Nightly    topiramate  200 mg oral Daily     PRN medications   Medication    ALPRAZolam    cyclobenzaprine    dextrose    dextrose    glucagon    glucagon    heparin    metoclopramide    temazepam     Continuous Medications   Medication Dose Last Rate    heparin  0-4,000 Units/hr 1,200 Units/hr (05/20/24 0645)    sodium chloride 0.9%  100 mL/hr 100 mL/hr (05/20/24 0516)       Physical Exam:  Physical Exam  Constitutional:       Appearance: She is ill-appearing.   HENT:      Head: Normocephalic.      Nose: Nose normal.      Mouth/Throat:      Mouth: Mucous membranes are moist.   Eyes:      Conjunctiva/sclera: Conjunctivae normal.    Neck:      Comments: No  JVD  Cardiovascular:      Rate and Rhythm: Normal rate and regular rhythm.      Heart sounds: No murmur heard.  Pulmonary:      Effort: Pulmonary effort is normal.      Breath sounds: Normal breath sounds.   Abdominal:      Palpations: Abdomen is soft.   Musculoskeletal:      Right lower leg: No edema.      Left lower leg: No edema.   Skin:     General: Skin is warm.   Neurological:      General: No focal deficit present.   Psychiatric:         Mood and Affect: Mood normal.            Assessment/Plan   Azucena Hagan is a 52 yo female with a PMH of DM Type II, Hypothyroidism, DDD, Anxiety who presented tot he ED with vomiting, nausea and R sided chest pain. Was admitted for c/f of pneumonia, NSTEMI. Trop 330> 655> 1049 >1170> 644    #NSTEMI vs demand ischemia   #N/V, Abd pain (resolved)  #Pneumonia (concern for aspiration vs gram negative)  #Hypokalemia, hypomagnesemia  #DM Type II     -Replete electrolytes per protocol (160 KCL today, 4g Mg) Goal K4/Phos3/Mg2  -Heparin gtt per ACS protocol  -c/w ASA, Atorvastatin, metoprolol  -Recommend Brilinta loading yesterday (Not completed)  -NPO for C today   -Recommend echocardiogram   -Will follow       Code Status:  DNR and No Intubation    I spent  minutes in the professional and overall care of this patient.        Olga Ferreira PA-C

## 2024-05-20 NOTE — CONSULTS
Patient has Malnutrition Diagnosis: No  Nutrition Assessment    Reason for Assessment: Admission nursing screening (MST=2 weight loss, eating poorly)    Patient is a 53 y.o. female presenting with: NSTEMI (non-ST elevated myocardial infarction) (Multi),   Past Medical History:   Diagnosis Date    Anxiety     Candidiasis, unspecified 12/27/2017    Yeast infection    Disease of thyroid gland     Dorsalgia, unspecified 04/23/2015    Dorsodynia    Gastro-esophageal reflux disease without esophagitis 04/21/2021    GERD (gastroesophageal reflux disease)    Generalized anxiety disorder 10/05/2022    Generalized anxiety disorder with panic attacks    Hypothyroidism, unspecified 11/17/2022    Hypothyroidism    Ocular pain, left eye 06/06/2021    Left eye pain    Other conditions influencing health status 11/16/2022    Diabetes mellitus type 2, uncontrolled    Otitis media, unspecified, unspecified ear 03/19/2018    Recurrent otitis media    Personal history of other diseases of the circulatory system     History of hypertension    Personal history of other diseases of the respiratory system 01/29/2018    History of acute sinusitis    Personal history of other infectious and parasitic diseases 08/13/2018    History of candidiasis    Personal history of other infectious and parasitic diseases 11/17/2015    History of viral infection    Personal history of other specified conditions     History of nausea    Personal history of other specified conditions 03/12/2019    History of vomiting    Personal history of other specified conditions 07/10/2014    History of diarrhea    Personal history of other specified conditions 08/08/2018    History of headache    Pure hypercholesterolemia, unspecified 02/16/2022    Hypercholesterolemia    Radiculopathy, lumbar region 05/20/2016    Right lumbar radiculopathy    Tachycardia, unspecified 02/16/2022    Tachycardia    Troponin level elevated 01/19/2024      Past Surgical History:   Procedure  "Laterality Date    MOUTH SURGERY  08/26/2014    Oral Surgery Tooth Extraction    TONSILLECTOMY  03/04/2014    Tonsillectomy With Adenoidectomy    TUBAL LIGATION  08/26/2014    Tubal Ligation      Nutrition History:  Food and Nutrient History: No oral  intakes documented over course of hospital admission. Pt NPO at time of visit. Pt reports she had emesis on Tuesday, none since. Pt with occasional diarrhea 2/2 IBS, does not eat red meat. PT declines ONS when offered. Pt reports a large weight loss (120#) 6 years ago, but lately weight stable.     Allergies   Allergen Reactions    Nut - Unspecified Anaphylaxis     Pecans only    Pecan Nut Shortness of breath    Cephalexin Other    House Dust Unknown    Hydrocodone-Acetaminophen Hallucinations and Other     Hallucinations    Insulin Aspart Itching    Insulin Regular Unknown    Nickel Other     Nickel-skin irritation    Red Dye Unknown    Statins-Hmg-Coa Reductase Inhibitors Other     Muscle cramping      Tree And Shrub Pollen Swelling    Venom-Wasp Other      GI Symptoms: Vomiting  Vitamin/Herbal Supplement Use: MVI  Oral Problems: None          Anthropometrics:  Height: 157.5 cm (5' 2.01\")   Weight: 54 kg (119 lb)   BMI (Calculated): 21.76  IBW/kg (Dietitian Calculated): 50 kg  Percent of IBW: 108 %       Weight History:   Wt Readings from Last 10 Encounters:   05/18/24 54 kg (119 lb)   05/18/24 51.7 kg (114 lb)   02/14/24 51.7 kg (114 lb)   01/31/24 50.8 kg (112 lb)   12/12/23 50.8 kg (112 lb)   11/20/23 51.7 kg (114 lb)   07/19/23 51.7 kg (114 lb)   10/22/22 49.9 kg (110 lb)   10/28/21 55.8 kg (123 lb)   06/29/20 56.7 kg (125 lb)       Weight Change %:  Weight History / % Weight Change: 54 kg (5/18/24) 51.7 kg (2/14/24); 50.8 kg (12/12/23); 51.7 kg (7/19/23)  Significant Weight Loss: No          Nutrition Focused Physical Exam Findings:    Subcutaneous Fat Loss:   Orbital Fat Pads: Well nourished (slightly bulging fat pads)  Buccal Fat Pads: Well nourished (full, " "rounded cheeks)  Muscle Wasting:  Temporalis: Well nourished (well-defined muscle)  Edema:  Edema: none  Physical Findings:  Skin: Negative (skin intact)    Nutrition Significant Labs:  CBC Trend:   Results from last 7 days   Lab Units 05/20/24  0044 05/19/24  0554 05/18/24  1522 05/18/24  0625   WBC AUTO x10*3/uL 13.1* 11.1 9.7 10.8   RBC AUTO x10*6/uL 4.07 3.60* 3.59* 4.12   HEMOGLOBIN g/dL 12.2 10.6* 10.7* 12.4   HEMATOCRIT % 35.3* 30.9* 33.3* 37.1   MCV fL 87 86 93 90   PLATELETS AUTO x10*3/uL 353 325 292 326    , BMP Trend:   Results from last 7 days   Lab Units 05/20/24 0530 05/20/24 0044 05/19/24  0554 05/18/24  1522 05/18/24  0625   GLUCOSE mg/dL 104*  --  177* 304* 317*   CALCIUM mg/dL 7.6*  --  8.2* 9.0 9.6   SODIUM mmol/L 137  --  137 137 134*   POTASSIUM mmol/L 2.7*   < > 2.8* 3.6 4.2   CO2 mmol/L 23  --  25 19* 22   CHLORIDE mmol/L 103  --  101 96* 93*   BUN mg/dL 14  --  19 24* 27*   CREATININE mg/dL 0.86  --  0.80 0.79 0.83    < > = values in this interval not displayed.    , A1C:  Lab Results   Component Value Date    HGBA1C 9.5 (A) 10/24/2023   , BG POCT trend:   Results from last 7 days   Lab Units 05/19/24  0810 05/19/24  0439 05/19/24  0008 05/18/24  1949 05/18/24  1605   POCT GLUCOSE mg/dL 181* 163* 121* 267* 301*    , Renal Lab Trend:   Results from last 7 days   Lab Units 05/20/24  0530 05/20/24  0044 05/19/24  0554 05/18/24  1522   POTASSIUM mmol/L 2.7* 2.5* 2.8* 3.6   SODIUM mmol/L 137  --  137 137   MAGNESIUM mg/dL 1.59*  --  1.66  --    EGFR mL/min/1.73m*2 82  --  89 >90   BUN mg/dL 14  --  19 24*   CREATININE mg/dL 0.86  --  0.80 0.79    , Vit D:   Lab Results   Component Value Date    VITD25 13 (A) 04/23/2021    , Vit B12: No results found for: \"XSIGLOAE81\" , CRP:   Lab Results   Component Value Date    CRP 9.72 (H) 05/19/2024     Lab Results   Component Value Date    ALBUMIN 2.9 (L) 05/20/2024         Nutrition Specific Medications:      Current Facility-Administered Medications:     " ALPRAZolam (Xanax) tablet 1 mg, 1 mg, oral, TID PRN, Paris Hernandez MD, 1 mg at 05/19/24 2026    aspirin chewable tablet 81 mg, 81 mg, oral, Daily, Paris Hernandez MD, 81 mg at 05/20/24 0901    atorvastatin (Lipitor) tablet 40 mg, 40 mg, oral, Nightly, Paris Hernandez MD, 40 mg at 05/19/24 2026    cyclobenzaprine (Flexeril) tablet 10 mg, 10 mg, oral, TID PRN, Paris Hernandez MD, 10 mg at 05/19/24 2144    dextrose 50 % injection 12.5 g, 12.5 g, intravenous, q15 min PRN, Paris Hernandez MD    dextrose 50 % injection 25 g, 25 g, intravenous, q15 min PRN, Ramesh Diaz MD    glucagon (Glucagen) injection 1 mg, 1 mg, intramuscular, q15 min PRN, Paris Hernandez MD    glucagon (Glucagen) injection 1 mg, 1 mg, intramuscular, q15 min PRN, Paris Hernandez MD    heparin 25,000 Units in dextrose 5% 250 mL (100 Units/mL) infusion (premix), 0-4,000 Units/hr, intravenous, Continuous, Paris Hernandez MD, Stopped at 05/20/24 1337    heparin bolus from bag 1,500-3,000 Units, 1,500-3,000 Units, intravenous, q4h PRN, Paris Hernandez MD    insulin glargine (Lantus) injection 35 Units, 35 Units, subcutaneous, q PM, Ramesh Diaz MD, 35 Units at 05/18/24 2108    insulin lispro (HumaLOG) injection 0-15 Units, 0-15 Units, subcutaneous, TID, Ramesh Diaz MD, 8 Units at 05/19/24 1624    levothyroxine (Synthroid, Levoxyl) tablet 100 mcg, 100 mcg, oral, Daily before breakfast, Paris Hernandez MD, 100 mcg at 05/20/24 0513    LORazepam (Ativan) injection 1 mg, 1 mg, intravenous, Once, Majo Harry MD    metoclopramide (Reglan) injection 10 mg, 10 mg, intravenous, q6h PRN, Paris Hernandez MD, 10 mg at 05/18/24 1539    metoprolol tartrate (Lopressor) tablet 25 mg, 25 mg, oral, BID, Paris Hernandez MD, 25 mg at 05/20/24 0901    piperacillin-tazobactam-dextrose (Zosyn) IV 3.375 g, 3.375 g, intravenous, q6h, Paris Hernandez MD, Stopped at 05/20/24 1136    sodium chloride 0.9% infusion, 100 mL/hr, intravenous, Continuous, Paris Hernandez MD, Last Rate: 100  mL/hr at 05/20/24 0516, 100 mL/hr at 05/20/24 0516    sodium chloride 0.9% infusion, 3 mL/kg/hr, intravenous, Continuous, Dharmesh Turner MD    temazepam (Restoril) capsule 30 mg, 30 mg, oral, Nightly PRN, Paris Hernandez MD, 30 mg at 05/19/24 2144    topiramate (Topamax) tablet 150 mg, 150 mg, oral, Nightly, Paris Hernandez MD, 150 mg at 05/19/24 2027    topiramate (Topamax) tablet 200 mg, 200 mg, oral, Daily, Paris eHrnandez MD, 200 mg at 05/20/24 0901   Nursing Data Per flowsheet:      Gastrointestinal  Gastrointestinal (WDL): Within Defined Limits  Abdomen Inspection: Soft  Abdominal Tenderness: Guarding  Bowel Sounds: All quadrants  Bowel Sounds (All Quadrants): Hypoactive, Present  Passing Flatus: Yes  Bowel Incontinence: No  Gastrointestinal Symptoms: Loss of appetite  Nausea Precipitating Factors: Anxiety/stress    Intake/Output Summary (Last 24 hours) at 5/20/2024 1459  Last data filed at 5/20/2024 1436  Gross per 24 hour   Intake 4798.2 ml   Output 160 ml   Net 4638.2 ml       Pain Score: 0 - No pain   Dietary Orders (From admission, onward)       Start     Ordered    05/20/24 1456  Adult diet Cardiac; 70 gm fat; 2 - 3 grams Sodium  Diet effective now        Question Answer Comment   Diet type Cardiac    Fat restriction: 70 gm fat    Sodium restriction: 2 - 3 grams Sodium        05/20/24 1456                     Estimated Needs:   Total Energy Estimated Needs (kCal):  (3284-0441 kcal (25-30 kcal/kg))     Total Protein Estimated Needs (g):  (65-75 g protein (1.2-1.4 g/kg))     Total Fluid Estimated Needs (mL):  (1 ml/kcal)          Nutrition Diagnosis   Malnutrition Diagnosis  Patient has Malnutrition Diagnosis: No    Nutrition Diagnosis  Patient has Nutrition Diagnosis: Yes  Diagnosis Status (1): New  Nutrition Diagnosis 1: Altered nutrition related to laboratory values  Related to (1): vomiting  As Evidenced by (1): low K+ level  Additional Nutrition Diagnosis: Diagnosis 2  Diagnosis Status (2):  New  Nutrition Diagnosis 2: Inadequate protein energy intake  Related to (2): NPO at time of visit, pt reports poor appetite PTA  As Evidenced by (2): self reports of decreased oral intakes  Additional Assessment Information (2): pt declines ONS and snacks when offered       Nutrition Interventions/Recommendations   Nutrition Prescription:  diet, fluids    Nutrition Interventions:   Coordination of Care: YAMEL Sosa  Nutrition Education:   Education Documentation  No documentation found.       Recommendations:  When able, resume Cardiac, Diabetic diet diet  Weights 2-3 x/week  If oral intakes are inadequate, liberalize diet to Regular         Nutrition Monitoring and Evaluation     Food/Nutrient Related History Monitoring  Monitoring and Evaluation Plan: Energy intake, Amount of food  Criteria: Pt will consume 50-75% of meals provided    Body Composition/Growth/Weight History  Monitoring and Evaluation Plan: Weight  Weight: Measured weight  Criteria: Pt will maintain current weight of 54 kg +/- 2 kg    Biochemical Data, Medical Tests and Procedures  Monitoring and Evaluation Plan: Electrolyte/renal panel, Glucose/endocrine profile  Electrolyte and Renal Panel: Potassium, Magnesium  Criteria: K+ and Mg level will be wnl  Glucose/Endocrine Profile: Glucose, casual  Criteria: BG level  mg/dL                 Time Spent (min): 60 minutes

## 2024-05-20 NOTE — PROGRESS NOTES
Speech-Language Pathology                 Therapy Communication Note    Patient Name: Azucena Hagan  MRN: [id number]  Today's Date: 5/20/2024   Time: 4:43 pm    Discipline: Speech Language Pathology    Missed Visit Reason: Just returned from Cath Lab. Sleepy.    Missed Time: Attempt    Comment: Will attempt again tomorrow.

## 2024-05-20 NOTE — PROGRESS NOTES
"Azucena Hagan is a 53 y.o. female on day 2 of admission presenting with NSTEMI (non-ST elevated myocardial infarction) (Multi).    Subjective   Pt says she is starting to feel better. She says abdominal pain has improved. Her N/V has resolved.       Objective     Physical Exam  Vitals and nursing note reviewed.   Constitutional:       General: She is not in acute distress.     Appearance: Normal appearance. She is not ill-appearing or toxic-appearing.   HENT:      Head: Normocephalic and atraumatic.      Nose: Nose normal.      Mouth/Throat:      Mouth: Mucous membranes are moist.   Eyes:      Extraocular Movements: Extraocular movements intact.      Conjunctiva/sclera: Conjunctivae normal.      Pupils: Pupils are equal, round, and reactive to light.   Cardiovascular:      Rate and Rhythm: Normal rate and regular rhythm.      Heart sounds: No murmur heard.     No gallop.   Pulmonary:      Effort: Pulmonary effort is normal. No respiratory distress.      Breath sounds: Rhonchi present. No wheezing or rales.   Abdominal:      General: Abdomen is flat. Bowel sounds are normal. There is distension.      Palpations: Abdomen is soft. There is no mass.      Tenderness: There is abdominal tenderness.   Musculoskeletal:         General: No swelling or tenderness. Normal range of motion.      Cervical back: Normal range of motion and neck supple.   Skin:     General: Skin is warm and dry.   Neurological:      General: No focal deficit present.      Mental Status: She is alert and oriented to person, place, and time. Mental status is at baseline.   Psychiatric:         Mood and Affect: Mood normal.         Behavior: Behavior normal.         Thought Content: Thought content normal.         Judgment: Judgment normal.         Last Recorded Vitals:  /81 (BP Location: Left arm, Patient Position: Lying)   Pulse 88   Temp 36.6 °C (97.8 °F) (Temporal)   Resp 16   Ht 1.575 m (5' 2.01\")   Wt 54 kg (119 lb)   SpO2 99%   " BMI 21.76 kg/m²      Scheduled medications:  aspirin, 81 mg, oral, Daily  atorvastatin, 40 mg, oral, Nightly  insulin glargine, 35 Units, subcutaneous, q PM  insulin lispro, 0-15 Units, subcutaneous, TID  levothyroxine, 100 mcg, oral, Daily before breakfast  LORazepam, 1 mg, intravenous, Once  metoprolol tartrate, 25 mg, oral, BID  piperacillin-tazobactam, 3.375 g, intravenous, q6h  potassium chloride, 20 mEq, intravenous, Once  topiramate, 150 mg, oral, Nightly  topiramate, 200 mg, oral, Daily      Continuous medications:  heparin, 0-4,000 Units/hr, Last Rate: 1,200 Units/hr (05/20/24 0645)  sodium chloride 0.9%, 100 mL/hr, Last Rate: 100 mL/hr (05/20/24 0516)      PRN medications:  PRN medications: ALPRAZolam, cyclobenzaprine, dextrose, dextrose, glucagon, glucagon, heparin, metoclopramide, temazepam     Relevant Results:  Results for orders placed or performed during the hospital encounter of 05/18/24 (from the past 24 hour(s))   CBC and Auto Differential   Result Value Ref Range    WBC 13.1 (H) 4.4 - 11.3 x10*3/uL    nRBC 0.0 0.0 - 0.0 /100 WBCs    RBC 4.07 4.00 - 5.20 x10*6/uL    Hemoglobin 12.2 12.0 - 16.0 g/dL    Hematocrit 35.3 (L) 36.0 - 46.0 %    MCV 87 80 - 100 fL    MCH 30.0 26.0 - 34.0 pg    MCHC 34.6 32.0 - 36.0 g/dL    RDW 12.5 11.5 - 14.5 %    Platelets 353 150 - 450 x10*3/uL    Neutrophils % 69.0 40.0 - 80.0 %    Immature Granulocytes %, Automated 1.1 (H) 0.0 - 0.9 %    Lymphocytes % 23.4 13.0 - 44.0 %    Monocytes % 6.1 2.0 - 10.0 %    Eosinophils % 0.2 0.0 - 6.0 %    Basophils % 0.2 0.0 - 2.0 %    Neutrophils Absolute 9.06 (H) 1.20 - 7.70 x10*3/uL    Immature Granulocytes Absolute, Automated 0.15 0.00 - 0.70 x10*3/uL    Lymphocytes Absolute 3.07 1.20 - 4.80 x10*3/uL    Monocytes Absolute 0.80 0.10 - 1.00 x10*3/uL    Eosinophils Absolute 0.02 0.00 - 0.70 x10*3/uL    Basophils Absolute 0.03 0.00 - 0.10 x10*3/uL   Potassium   Result Value Ref Range    Potassium 2.5 (LL) 3.5 - 5.3 mmol/L    Comprehensive Metabolic Panel   Result Value Ref Range    Glucose 104 (H) 74 - 99 mg/dL    Sodium 137 136 - 145 mmol/L    Potassium 2.7 (LL) 3.5 - 5.3 mmol/L    Chloride 103 98 - 107 mmol/L    Bicarbonate 23 21 - 32 mmol/L    Anion Gap 14 10 - 20 mmol/L    Urea Nitrogen 14 6 - 23 mg/dL    Creatinine 0.86 0.50 - 1.05 mg/dL    eGFR 82 >60 mL/min/1.73m*2    Calcium 7.6 (L) 8.6 - 10.3 mg/dL    Albumin 2.9 (L) 3.4 - 5.0 g/dL    Alkaline Phosphatase 71 33 - 110 U/L    Total Protein 5.5 (L) 6.4 - 8.2 g/dL    AST 32 9 - 39 U/L    Bilirubin, Total 0.7 0.0 - 1.2 mg/dL    ALT 46 (H) 7 - 45 U/L   Heparin Assay, UFH   Result Value Ref Range    Heparin Unfractionated 0.5 See Comment Below for Therapeutic Ranges IU/mL   Lavender Top   Result Value Ref Range    Extra Tube Hold for add-ons.    Magnesium   Result Value Ref Range    Magnesium 1.59 (L) 1.60 - 2.40 mg/dL   Troponin I, High Sensitivity   Result Value Ref Range    Troponin I, High Sensitivity 644 (HH) 0 - 13 ng/L       US right upper quadrant    Result Date: 5/20/2024  Interpreted By:  Freddie Tinoco, STUDY: US RIGHT UPPER QUADRANT; 5/19/2024 10:39 am   INDICATION: Signs/Symptoms:RUQ abdominal pain.   COMPARISON: CT abdomen pelvis 05/18/2024.   ACCESSION NUMBER(S): [id number]   ORDERING CLINICIAN: SHAUN FUENTES   TECHNIQUE: Sonography of the right upper quadrant  was performed.   FINDINGS: Pancreas: Obscured by shadowing bowel gas.   Liver: *Size: 12 cm. *Echotexture: Normal, homogeneous. *Echogenicity: Normal. *Surface contour: Smooth. *Lesions: None.   Biliary: No intrahepatic biliary duct dilation.   CBD: 4 mm at the hilum, normal.   Gallbladder: *Caliber: Non-dilated. *Stones: None. *Sludge: None. *Wall thickening: Negative. *Sonographic Rolle sign: Negative.   Right Kidney: No hydronephrosis.   Ascites: Trace perihepatic free fluid, nonspecific.       No cholelithiasis or sonographic evidence of acute cholecystitis.   MACRO: None.   Signed by: Freddie Tinoco  5/20/2024 9:02 AM Dictation workstation:   [id number]    ECG 12 lead    Result Date: 5/20/2024  Sinus tachycardia Low voltage QRS Septal infarct (cited on or before 20-NOV-2023) Abnormal ECG When compared with ECG of 18-MAY-2024 07:50, (unconfirmed) Non-specific change in ST segment in Anterior leads    CT head wo IV contrast    Result Date: 5/18/2024  Interpreted By:  Matty Laboy, STUDY: JUSTINA JESSICA; 5/18/2024 12:01 pm   INDICATION: Signs/Symptoms:Headache after heparin started for NSTEMI;   COMPARISON: None   ACCESSION NUMBER(S): [id number]   ORDERING CLINICIAN: JUSTINA JESSICA   TECHNIQUE: Contiguous axial images were acquired from the vertex through the posterior fossa without IV contrast. All CT examinations are performed with 1 or more of the following dose reduction techniques: Automated exposure control, adjustment of mA and/or kv according to patient's size, or use of iterative reconstruction techniques.   FINDINGS: No focal mass effect or midline shift is identified. The ventricles and sulci are symmetric and appropriate for the patient's age.   The gray white matter differentiation is preserved.   No acute intracranial hemorrhage is seen. No intra-axial or extra-axial fluid collection is seen.   The visualized paranasal sinuses and mastoid air cells are clear.       No CT evidence for acute intracranial pathology.   Signed by: Matty Laboy 5/18/2024 12:36 PM Dictation workstation:   [id number]           Assessment/Plan   Principal Problem:    NSTEMI (non-ST elevated myocardial infarction) (Multi)    NSTEMI  Vs Possibly from demand ischemia  - cardio following  - hep gtt  - statin/ASA  - brilinta  - LHC today per cardio    Concern for Gram - pneumonia from aspiration  - ID following  - procal pending  - Uag pending  - duoneb  - zosyn  - speech eval    HypoK/HypoMg  - replace    Euglycemic DKA  Resolved  Hx DM  - endo following  - insulin  - ISS    N/V  resolved  - reglan PRN  -  IVF     BP improved  - low thresholdw starting BP meds     GERD  - protonix     Transaminitis  - monitor     Anxiety  - xanax PRN     Hypothyroid   - Synthroid     Diet: NPO  DVT ppx: hep gtt  Code status: DNR CCA/DNI confirmed with pt  Dispo: monitor clinically, LHC today per cardio         Paris Hernandez MD  Hospitalist

## 2024-05-21 ENCOUNTER — APPOINTMENT (OUTPATIENT)
Dept: PRIMARY CARE | Facility: CLINIC | Age: 54
End: 2024-05-21
Payer: COMMERCIAL

## 2024-05-21 ENCOUNTER — APPOINTMENT (OUTPATIENT)
Dept: CARDIOLOGY | Facility: HOSPITAL | Age: 54
DRG: 286 | End: 2024-05-21
Payer: COMMERCIAL

## 2024-05-21 LAB
ALBUMIN SERPL BCP-MCNC: 2.7 G/DL (ref 3.4–5)
ALP SERPL-CCNC: 59 U/L (ref 33–110)
ALT SERPL W P-5'-P-CCNC: 33 U/L (ref 7–45)
ANION GAP SERPL CALC-SCNC: 14 MMOL/L (ref 10–20)
AORTIC VALVE MEAN GRADIENT: 2 MMHG
AORTIC VALVE PEAK VELOCITY: 0.82 M/S
AST SERPL W P-5'-P-CCNC: 16 U/L (ref 9–39)
AV PEAK GRADIENT: 2.7 MMHG
AVA (PEAK VEL): 2.38 CM2
AVA (VTI): 2.76 CM2
BASOPHILS # BLD AUTO: 0.04 X10*3/UL (ref 0–0.1)
BASOPHILS NFR BLD AUTO: 0.4 %
BILIRUB SERPL-MCNC: 0.6 MG/DL (ref 0–1.2)
BUN SERPL-MCNC: 11 MG/DL (ref 6–23)
CALCIUM SERPL-MCNC: 7.2 MG/DL (ref 8.6–10.3)
CHLORIDE SERPL-SCNC: 109 MMOL/L (ref 98–107)
CO2 SERPL-SCNC: 16 MMOL/L (ref 21–32)
CREAT SERPL-MCNC: 0.83 MG/DL (ref 0.5–1.05)
EGFRCR SERPLBLD CKD-EPI 2021: 85 ML/MIN/1.73M*2
EJECTION FRACTION APICAL 4 CHAMBER: 31.2
EOSINOPHIL # BLD AUTO: 0.27 X10*3/UL (ref 0–0.7)
EOSINOPHIL NFR BLD AUTO: 2.6 %
ERYTHROCYTE [DISTWIDTH] IN BLOOD BY AUTOMATED COUNT: 13.3 % (ref 11.5–14.5)
GLUCOSE SERPL-MCNC: 82 MG/DL (ref 74–99)
HCT VFR BLD AUTO: 35.8 % (ref 36–46)
HGB BLD-MCNC: 11.8 G/DL (ref 12–16)
IMM GRANULOCYTES # BLD AUTO: 0.19 X10*3/UL (ref 0–0.7)
IMM GRANULOCYTES NFR BLD AUTO: 1.9 % (ref 0–0.9)
LEFT ATRIUM VOLUME AREA LENGTH INDEX BSA: 16.8 ML/M2
LEFT VENTRICLE INTERNAL DIMENSION DIASTOLE: 3.68 CM (ref 3.5–6)
LEFT VENTRICULAR OUTFLOW TRACT DIAMETER: 1.9 CM
LV EJECTION FRACTION BIPLANE: 31 %
LYMPHOCYTES # BLD AUTO: 3.02 X10*3/UL (ref 1.2–4.8)
LYMPHOCYTES NFR BLD AUTO: 29.5 %
MCH RBC QN AUTO: 29.9 PG (ref 26–34)
MCHC RBC AUTO-ENTMCNC: 33 G/DL (ref 32–36)
MCV RBC AUTO: 91 FL (ref 80–100)
MITRAL VALVE E/A RATIO: 0.95
MITRAL VALVE E/E' RATIO: 7.82
MONOCYTES # BLD AUTO: 0.69 X10*3/UL (ref 0.1–1)
MONOCYTES NFR BLD AUTO: 6.7 %
NEUTROPHILS # BLD AUTO: 6.02 X10*3/UL (ref 1.2–7.7)
NEUTROPHILS NFR BLD AUTO: 58.9 %
NRBC BLD-RTO: 0 /100 WBCS (ref 0–0)
PLATELET # BLD AUTO: 346 X10*3/UL (ref 150–450)
POTASSIUM SERPL-SCNC: 3.5 MMOL/L (ref 3.5–5.3)
PROT SERPL-MCNC: 5.1 G/DL (ref 6.4–8.2)
RBC # BLD AUTO: 3.95 X10*6/UL (ref 4–5.2)
RIGHT VENTRICLE FREE WALL PEAK S': 10.9 CM/S
RIGHT VENTRICLE PEAK SYSTOLIC PRESSURE: 16.8 MMHG
SODIUM SERPL-SCNC: 135 MMOL/L (ref 136–145)
TRICUSPID ANNULAR PLANE SYSTOLIC EXCURSION: 1.5 CM
WBC # BLD AUTO: 10.2 X10*3/UL (ref 4.4–11.3)

## 2024-05-21 PROCEDURE — 80053 COMPREHEN METABOLIC PANEL: CPT | Performed by: STUDENT IN AN ORGANIZED HEALTH CARE EDUCATION/TRAINING PROGRAM

## 2024-05-21 PROCEDURE — 2500000001 HC RX 250 WO HCPCS SELF ADMINISTERED DRUGS (ALT 637 FOR MEDICARE OP): Performed by: FAMILY MEDICINE

## 2024-05-21 PROCEDURE — 2060000001 HC INTERMEDIATE ICU ROOM DAILY

## 2024-05-21 PROCEDURE — 2500000004 HC RX 250 GENERAL PHARMACY W/ HCPCS (ALT 636 FOR OP/ED): Performed by: PHYSICIAN ASSISTANT

## 2024-05-21 PROCEDURE — 2500000006 HC RX 250 W HCPCS SELF ADMINISTERED DRUGS (ALT 637 FOR ALL PAYERS): Performed by: FAMILY MEDICINE

## 2024-05-21 PROCEDURE — 2500000004 HC RX 250 GENERAL PHARMACY W/ HCPCS (ALT 636 FOR OP/ED): Performed by: STUDENT IN AN ORGANIZED HEALTH CARE EDUCATION/TRAINING PROGRAM

## 2024-05-21 PROCEDURE — 99231 SBSQ HOSP IP/OBS SF/LOW 25: CPT | Performed by: INTERNAL MEDICINE

## 2024-05-21 PROCEDURE — 85025 COMPLETE CBC W/AUTO DIFF WBC: CPT | Performed by: STUDENT IN AN ORGANIZED HEALTH CARE EDUCATION/TRAINING PROGRAM

## 2024-05-21 PROCEDURE — 99233 SBSQ HOSP IP/OBS HIGH 50: CPT | Performed by: PHYSICIAN ASSISTANT

## 2024-05-21 PROCEDURE — 92610 EVALUATE SWALLOWING FUNCTION: CPT | Mod: GN

## 2024-05-21 PROCEDURE — 2500000001 HC RX 250 WO HCPCS SELF ADMINISTERED DRUGS (ALT 637 FOR MEDICARE OP): Performed by: STUDENT IN AN ORGANIZED HEALTH CARE EDUCATION/TRAINING PROGRAM

## 2024-05-21 PROCEDURE — 99233 SBSQ HOSP IP/OBS HIGH 50: CPT | Performed by: FAMILY MEDICINE

## 2024-05-21 PROCEDURE — 2500000002 HC RX 250 W HCPCS SELF ADMINISTERED DRUGS (ALT 637 FOR MEDICARE OP, ALT 636 FOR OP/ED): Performed by: INTERNAL MEDICINE

## 2024-05-21 PROCEDURE — 93306 TTE W/DOPPLER COMPLETE: CPT

## 2024-05-21 PROCEDURE — 36415 COLL VENOUS BLD VENIPUNCTURE: CPT | Performed by: STUDENT IN AN ORGANIZED HEALTH CARE EDUCATION/TRAINING PROGRAM

## 2024-05-21 RX ORDER — TOPIRAMATE SPINKLE 15 MG/1
300 CAPSULE ORAL DAILY
Status: DISCONTINUED | OUTPATIENT
Start: 2024-05-21 | End: 2024-05-21

## 2024-05-21 RX ORDER — ATORVASTATIN CALCIUM 80 MG/1
80 TABLET, FILM COATED ORAL NIGHTLY
Status: DISCONTINUED | OUTPATIENT
Start: 2024-05-22 | End: 2024-05-24 | Stop reason: HOSPADM

## 2024-05-21 RX ORDER — TOPIRAMATE 100 MG/1
300 CAPSULE, EXTENDED RELEASE ORAL DAILY
Status: DISCONTINUED | OUTPATIENT
Start: 2024-05-21 | End: 2024-05-24 | Stop reason: HOSPADM

## 2024-05-21 RX ORDER — SPIRONOLACTONE 25 MG/1
12.5 TABLET ORAL DAILY
Status: DISCONTINUED | OUTPATIENT
Start: 2024-05-21 | End: 2024-05-24 | Stop reason: HOSPADM

## 2024-05-21 RX ORDER — METOPROLOL SUCCINATE 50 MG/1
50 TABLET, EXTENDED RELEASE ORAL DAILY
Status: DISCONTINUED | OUTPATIENT
Start: 2024-05-22 | End: 2024-05-22

## 2024-05-21 RX ADMIN — LEVOTHYROXINE SODIUM 100 MCG: 100 TABLET ORAL at 08:17

## 2024-05-21 RX ADMIN — PIPERACILLIN SODIUM AND TAZOBACTAM SODIUM 3.38 G: 3; .375 INJECTION, SOLUTION INTRAVENOUS at 04:35

## 2024-05-21 RX ADMIN — ALPRAZOLAM 1 MG: 0.5 TABLET ORAL at 12:00

## 2024-05-21 RX ADMIN — ASPIRIN 81 MG 81 MG: 81 TABLET ORAL at 08:17

## 2024-05-21 RX ADMIN — METOPROLOL TARTRATE 25 MG: 25 TABLET, FILM COATED ORAL at 21:10

## 2024-05-21 RX ADMIN — PIPERACILLIN SODIUM AND TAZOBACTAM SODIUM 3.38 G: 3; .375 INJECTION, SOLUTION INTRAVENOUS at 10:15

## 2024-05-21 RX ADMIN — SPIRONOLACTONE 12.5 MG: 25 TABLET ORAL at 15:49

## 2024-05-21 RX ADMIN — PERFLUTREN 1 ML OF DILUTION: 6.52 INJECTION, SUSPENSION INTRAVENOUS at 11:25

## 2024-05-21 RX ADMIN — TOPIRAMATE 300 MG: 100 CAPSULE, EXTENDED RELEASE ORAL at 12:08

## 2024-05-21 RX ADMIN — INSULIN GLARGINE 20 UNITS: 100 INJECTION, SOLUTION SUBCUTANEOUS at 21:10

## 2024-05-21 RX ADMIN — METOPROLOL TARTRATE 25 MG: 25 TABLET, FILM COATED ORAL at 08:17

## 2024-05-21 RX ADMIN — PIPERACILLIN SODIUM AND TAZOBACTAM SODIUM 3.38 G: 3; .375 INJECTION, SOLUTION INTRAVENOUS at 21:11

## 2024-05-21 RX ADMIN — SACUBITRIL AND VALSARTAN 1 TABLET: 24; 26 TABLET, FILM COATED ORAL at 21:10

## 2024-05-21 ASSESSMENT — COGNITIVE AND FUNCTIONAL STATUS - GENERAL
DRESSING REGULAR LOWER BODY CLOTHING: A LITTLE
EATING MEALS: A LITTLE
MOVING TO AND FROM BED TO CHAIR: A LITTLE
WALKING IN HOSPITAL ROOM: A LOT
WALKING IN HOSPITAL ROOM: A LOT
TURNING FROM BACK TO SIDE WHILE IN FLAT BAD: A LITTLE
DRESSING REGULAR UPPER BODY CLOTHING: A LITTLE
DRESSING REGULAR LOWER BODY CLOTHING: A LITTLE
TURNING FROM BACK TO SIDE WHILE IN FLAT BAD: A LITTLE
EATING MEALS: A LITTLE
CLIMB 3 TO 5 STEPS WITH RAILING: A LOT
TOILETING: A LITTLE
DRESSING REGULAR UPPER BODY CLOTHING: A LITTLE
TURNING FROM BACK TO SIDE WHILE IN FLAT BAD: A LITTLE
PERSONAL GROOMING: A LITTLE
MOVING TO AND FROM BED TO CHAIR: A LITTLE
HELP NEEDED FOR BATHING: A LITTLE
MOVING TO AND FROM BED TO CHAIR: A LITTLE
STANDING UP FROM CHAIR USING ARMS: A LITTLE
TOILETING: A LITTLE
MOVING FROM LYING ON BACK TO SITTING ON SIDE OF FLAT BED WITH BEDRAILS: A LITTLE
MOBILITY SCORE: 16
DAILY ACTIVITIY SCORE: 18
MOBILITY SCORE: 16
STANDING UP FROM CHAIR USING ARMS: A LITTLE
STANDING UP FROM CHAIR USING ARMS: A LITTLE
DAILY ACTIVITIY SCORE: 18
MOVING FROM LYING ON BACK TO SITTING ON SIDE OF FLAT BED WITH BEDRAILS: A LITTLE
CLIMB 3 TO 5 STEPS WITH RAILING: A LOT
WALKING IN HOSPITAL ROOM: A LOT
CLIMB 3 TO 5 STEPS WITH RAILING: A LOT
MOBILITY SCORE: 17
HELP NEEDED FOR BATHING: A LITTLE
PERSONAL GROOMING: A LITTLE

## 2024-05-21 ASSESSMENT — PAIN SCALES - GENERAL
PAINLEVEL_OUTOF10: 0 - NO PAIN
PAINLEVEL_OUTOF10: 7

## 2024-05-21 ASSESSMENT — PAIN - FUNCTIONAL ASSESSMENT
PAIN_FUNCTIONAL_ASSESSMENT: 0-10

## 2024-05-21 NOTE — PROGRESS NOTES
Jorge Luis Hagan is a 53 y.o. female on day 3 of admission presenting with NSTEMI (non-ST elevated myocardial infarction) (Multi).      Subjective   Patient resting in bed, able to tolerate PO intake and no further episodes of vomiting. Denies any other complaints at this time and is eager for discharge.        Objective     Last Recorded Vitals  /82 (BP Location: Left arm, Patient Position: Lying)   Pulse 87   Temp 36 °C (96.8 °F) (Temporal)   Resp 19   Wt 54 kg (119 lb)   SpO2 100%   Intake/Output last 3 Shifts:    Intake/Output Summary (Last 24 hours) at 5/21/2024 1245  Last data filed at 5/21/2024 1120  Gross per 24 hour   Intake 4035.27 ml   Output 610 ml   Net 3425.27 ml       Admission Weight  Weight: 54 kg (119 lb) (05/18/24 1508)    Daily Weight  05/18/24 : 54 kg (119 lb)    Image Results  Transthoracic Echo (TTE) [facility], [address] [place] [address]                Tel [phone] and Fax [phone]    TRANSTHORACIC ECHOCARDIOGRAM REPORT       Patient Name:      JORGE LUIS HAGAN     Reading Physician:    [id number] Aneesh Hay MD  Study Date:        5/21/2024            Ordering Provider:    [id number] DIDIER BRUNER  MRN/PID:           [id number]             Fellow:  Accession#:        [id number]         Nurse:                Jennifer Scott RN  Date of Birth/Age: 1970 / 53      Sonographer:          Lauren Em RDCS                     years  Gender:            F                    Additional Staff:  Height:            157.48 cm            Admit Date:           5/18/2024  Weight:            53.98 kg             Admission Status:     Inpatient -                                                                Routine  BSA / BMI:         1.53 m2 / 21.77       Encounter#:           [id number]                     kg/m2                                          Department Location:  [facility] Non                                                                Invasive  Blood Pressure: 87 /56 mmHg    Study Type:    TRANSTHORACIC ECHO (TTE) COMPLETE  Diagnosis/ICD: Non ST elevation (NSTEMI) myocardial infarction-I21.4  Indication:    Acute Coronary Syndrome: Non-STEMI  CPT Code:      Echo Complete w Full Doppler-00712    Patient History:  Pertinent History: Chest Pain.    Study Detail: The following Echo studies were performed: 2D, M-Mode, Doppler and                color flow. Definity used as a contrast agent for endocardial                border definition. Total contrast used for this procedure was 1.0                mL via IV push. The patient was awake.       PHYSICIAN INTERPRETATION:  Left Ventricle: The left ventricular systolic function is moderately to severely decreased, with an estimated ejection fraction of 30%. There is global hypokinesis of the left ventricle with minor regional variations. The left ventricular cavity size is normal. There is mildly increased left ventricular posterior wall thickness. Left ventricular diastolic filling was indeterminate.  Left Atrium: The left atrium is normal in size. Hypermobile interatrial septum.  Right Ventricle: The right ventricle is normal in size. There is normal right ventricular global systolic function.  Right Atrium: The right atrium is normal in size.  Aortic Valve: The aortic valve is trileaflet. There is minimal aortic valve cusp calcification. There is no evidence of aortic valve stenosis.  There is no evidence of aortic valve regurgitation. The peak instantaneous gradient of the aortic valve is 2.7 mmHg. The mean gradient of the aortic valve is 2.0 mmHg.  Mitral Valve: The mitral valve is normal in structure. There is trace to mild mitral valve regurgitation.  Tricuspid Valve: The tricuspid valve is  structurally normal. There is trace to mild tricuspid regurgitation. The right ventricular systolic pressure is unable to be estimated.  Pulmonic Valve: The pulmonic valve is structurally normal. There is physiologic pulmonic valve regurgitation.  Pericardium: There is no pericardial effusion noted.  Aorta: The aortic root is normal.  In comparison to the previous echocardiogram(s): There are no prior studies on this patient for comparison purposes.       CONCLUSIONS:   1. Left ventricular systolic function is moderately to severely decreased with a 30% estimated ejection fraction.   2. There is global hypokinesis of the left ventricle with minor regional variations.    QUANTITATIVE DATA SUMMARY:  2D MEASUREMENTS:                           Normal Ranges:  Ao Root d:     2.90 cm   (2.0-3.7cm)  IVSd:          0.86 cm   (0.6-1.1cm)  LVPWd:         1.02 cm   (0.6-1.1cm)  LVIDd:         3.68 cm   (3.9-5.9cm)  LVIDs:         3.08 cm  LV Mass Index: 66.7 g/m2  LV % FS        16.3 %    LA VOLUME:                                Normal Ranges:  LA Vol A4C:        20.6 ml    (22+/-6mL/m2)  LA Vol A2C:        25.7 ml  LA Vol BP:         25.8 ml  LA Vol Index A4C:  13.5ml/m2  LA Vol Index A2C:  16.8 ml/m2  LA Vol Index BP:   16.8 ml/m2  LA Area A4C:       9.8 cm2  LA Area A2C:       12.2 cm2  LA Major Axis A4C: 3.9 cm  LA Major Axis A2C: 4.9 cm  LA Volume Index:   16.9 ml/m2  LA Vol A4C:        17.2 ml  LA Vol A2C:        25.9 ml    RA VOLUME BY A/L METHOD:                       Normal Ranges:  RA Area A4C: 6.3 cm2    M-MODE MEASUREMENTS:                   Normal Ranges:  Ao Root: 3.10 cm (2.0-3.7cm)  LAs:     3.60 cm (2.7-4.0cm)    AORTA MEASUREMENTS:                       Normal Ranges:  Ao Sinus, d: 2.90 cm (2.1-3.5cm)  Asc Ao, d:   2.70 cm (2.1-3.4cm)    LV SYSTOLIC FUNCTION BY 2D PLANIMETRY (MOD):                      Normal Ranges:  EF-A4C View: 31.2 % (>=55%)  EF-A2C View: 30.5 %  EF-Biplane:  30.7 %    LV DIASTOLIC  FUNCTION:                                Normal Ranges:  MV Peak E:        0.78 m/s    (0.7-1.2 m/s)  MV Peak A:        0.82 m/s    (0.42-0.7 m/s)  E/A Ratio:        0.95        (1.0-2.2)  MV e'             0.10 m/s    (>8.0)  MV lateral e'     0.10 m/s  MV medial e'      0.07 m/s  MV A Dur:         151.00 msec  E/e' Ratio:       7.82        (<8.0)  PulmV Sys Jese:    39.70 cm/s  PulmV Enrique Jese:   31.50 cm/s  PulmV S/D Jese:    1.30  PulmV A Revs Jese: 31.50 cm/s  PulmV A Revs Dur: 119.00 msec    MITRAL VALVE:                  Normal Ranges:  MV DT: 164 msec (150-240msec)    AORTIC VALVE:                                    Normal Ranges:  AoV Vmax:                0.82 m/s (<=1.7m/s)  AoV Peak P.7 mmHg (<20mmHg)  AoV Mean P.0 mmHg (1.7-11.5mmHg)  LVOT Max Jese:            0.69 m/s (<=1.1m/s)  AoV VTI:                 14.40 cm (18-25cm)  LVOT VTI:                14.00 cm  LVOT Diameter:           1.90 cm  (1.8-2.4cm)  AoV Area, VTI:           2.76 cm2 (2.5-5.5cm2)  AoV Area,Vmax:           2.38 cm2 (2.5-4.5cm2)  AoV Dimensionless Index: 0.97       RIGHT VENTRICLE:  RV Basal 2.42 cm  RV Mid   1.60 cm  RV Major 6.2 cm  TAPSE:   14.6 mm  RV s'    0.11 m/s    TRICUSPID VALVE/RVSP:                              Normal Ranges:  Peak TR Velocity: 1.86 m/s  RV Syst Pressure: 16.8 mmHg (< 30mmHg)  IVC Diam:         1.84 cm    PULMONIC VALVE:                       Normal Ranges:  PV Max Jese: 0.7 m/s  (0.6-0.9m/s)  PV Max P.1 mmHg    Pulmonary Veins:  PulmV A Revs Dur: 119.00 msec  PulmV A Revs Jese: 31.50 cm/s  PulmV Enrique Jese:   31.50 cm/s  PulmV S/D Jese:    1.30  PulmV Sys Jese:    39.70 cm/s       [id number] Aneesh Hay MD  Electronically signed on 2024 at 12:02:07 PM       ** Final **      Physical Exam  Constitutional: alert and oriented x 3, awake, cooperative, no acute distress  Skin: warm and dry  Head/Neck: Normocephalic, atraumatic  Eyes: clear sclera  ENMT: mucous membranes  moist  Cardio: Regular rate and rhythm  Resp: CTA bilaterally, good respiratory effort  Gastrointestinal: Soft, nontender, nondistended, BSx4  Musculoskeletal: generalized weakness-at baseline per   Neuro: alert and oriented x 3  Psychological: Appropriate mood and behavior    Relevant Results  Scheduled medications  aspirin, 81 mg, oral, Daily  atorvastatin, 40 mg, oral, Nightly  insulin glargine, 20 Units, subcutaneous, q PM  insulin lispro, 0-15 Units, subcutaneous, TID  levothyroxine, 100 mcg, oral, Daily before breakfast  LORazepam, 1 mg, intravenous, Once  metoprolol tartrate, 25 mg, oral, BID  piperacillin-tazobactam, 3.375 g, intravenous, q6h  topiramate, 300 mg, oral, Daily      Continuous medications     PRN medications  PRN medications: acetaminophen **OR** acetaminophen **OR** acetaminophen, ALPRAZolam, cyclobenzaprine, dextrose, dextrose, glucagon, glucagon, metoclopramide, temazepam    Results for orders placed or performed during the hospital encounter of 05/18/24 (from the past 24 hour(s))   Potassium   Result Value Ref Range    Potassium 3.6 3.5 - 5.3 mmol/L   Comprehensive Metabolic Panel   Result Value Ref Range    Glucose 82 74 - 99 mg/dL    Sodium 135 (L) 136 - 145 mmol/L    Potassium 3.5 3.5 - 5.3 mmol/L    Chloride 109 (H) 98 - 107 mmol/L    Bicarbonate 16 (L) 21 - 32 mmol/L    Anion Gap 14 10 - 20 mmol/L    Urea Nitrogen 11 6 - 23 mg/dL    Creatinine 0.83 0.50 - 1.05 mg/dL    eGFR 85 >60 mL/min/1.73m*2    Calcium 7.2 (L) 8.6 - 10.3 mg/dL    Albumin 2.7 (L) 3.4 - 5.0 g/dL    Alkaline Phosphatase 59 33 - 110 U/L    Total Protein 5.1 (L) 6.4 - 8.2 g/dL    AST 16 9 - 39 U/L    Bilirubin, Total 0.6 0.0 - 1.2 mg/dL    ALT 33 7 - 45 U/L   CBC and Auto Differential   Result Value Ref Range    WBC 10.2 4.4 - 11.3 x10*3/uL    nRBC 0.0 0.0 - 0.0 /100 WBCs    RBC 3.95 (L) 4.00 - 5.20 x10*6/uL    Hemoglobin 11.8 (L) 12.0 - 16.0 g/dL    Hematocrit 35.8 (L) 36.0 - 46.0 %    MCV 91 80 - 100 fL    MCH  29.9 26.0 - 34.0 pg    MCHC 33.0 32.0 - 36.0 g/dL    RDW 13.3 11.5 - 14.5 %    Platelets 346 150 - 450 x10*3/uL    Neutrophils % 58.9 40.0 - 80.0 %    Immature Granulocytes %, Automated 1.9 (H) 0.0 - 0.9 %    Lymphocytes % 29.5 13.0 - 44.0 %    Monocytes % 6.7 2.0 - 10.0 %    Eosinophils % 2.6 0.0 - 6.0 %    Basophils % 0.4 0.0 - 2.0 %    Neutrophils Absolute 6.02 1.20 - 7.70 x10*3/uL    Immature Granulocytes Absolute, Automated 0.19 0.00 - 0.70 x10*3/uL    Lymphocytes Absolute 3.02 1.20 - 4.80 x10*3/uL    Monocytes Absolute 0.69 0.10 - 1.00 x10*3/uL    Eosinophils Absolute 0.27 0.00 - 0.70 x10*3/uL    Basophils Absolute 0.04 0.00 - 0.10 x10*3/uL   Transthoracic Echo (TTE) Complete   Result Value Ref Range    AV pk stacy 0.82 m/s    AV mn grad 2.0 mmHg    LVOT diam 1.90 cm    LV Biplane EF 31 %    MV avg E/e' ratio 7.82     MV E/A ratio 0.95     LA vol index A/L 16.8 ml/m2    Tricuspid annular plane systolic excursion 1.5 cm    RV free wall pk S' 10.90 cm/s    LVIDd 3.68 cm    RVSP 16.8 mmHg    Aortic Valve Area by Continuity of VTI 2.76 cm2    Aortic Valve Area by Continuity of Peak Velocity 2.38 cm2    AV pk grad 2.7 mmHg    LV A4C EF 31.2        Assessment/Plan   54yo CF with PMH of hypothyroidism, IDDM-type II, GERD, anxiety, and chronic back pain presented to the ED with N/V and was admitted for possible NSTEMI.    New onset CHF  - no obvious signs of fluid overload on exam  - echo today showed EF 30%  - cardio following, plan for GDMT once meds affordable  - per cardio, plan to start entresto, Toprol, and spironolactone  - continue telemetry    Demand ischemia  - initially treated per ACS protocol with heparin drip for possible NSTEMI but ruled out  - likely secondary to above  - s/p LHC on 5/20 which was negative  - cardio following, continue    Poorly controlled IDDM-type II  - euglycemic DKA on admission  - last A1c 13 in June 2023 was 13.6, repeat ordered  - endo following, rec lantus 20u qPM and will likely  need prandial insulin at discharge  - per endo, has hx of DKA and should not be on [id number]    Possible aspiration pneumonia  - likely secondary to multiple episodes of repetitive vomiting prior to admission  - ID following, rec continue zosyn while admitted and transition to augmentin to complete course on 5/24    Chronic back pain  - has caused patient to not work and be mostly wheelchair bound, working with pain management  - likely source of progressive deconditioning along with poorly controlled comorbidities above  - plan to discharge with [facility]    Hypothyroidism  - continue home levothyroxine    DVT Proph: SCDs    Dispo: Patient requires close inpatient monitoring in setting off new onset CHF, discharge likely tomorrow if tolerates new medications.    Principal Problem:    NSTEMI (non-ST elevated myocardial infarction) (Multi)        Shanell Nolan DO

## 2024-05-21 NOTE — CARE PLAN
The patient's goals for the shift include to not be nauseated    The clinical goals for the shift include Patient will have no bleeding while removing air from TR band.

## 2024-05-21 NOTE — PROGRESS NOTES
"Speech-Language Pathology    Speech-Language Pathology Clinical Swallow Evaluation    Patient Name: Azucena Hagan  MRN: [id number]  : 1970  Today's Date: 24  Start time: Start Time: 0845  Stop time: Stop Time: 0900  Time calculation (min) : Time Calculation (min): 15 min      ASSESSMENT  Impressions:  No oral phase and no suspected pharyngeal phase dysphagia based on clinical swallow evaluation.  Prognosis: Good    PLAN  Recommendations:  MBSS recommended: No; no pharyngeal dysphagia suspected.  Solid consistency: Regular (IDDSI level 7)  Liquid consistency: Thin (IDDSI 0)  Medication administration: Whole, in thin liquid  Compensatory swallow strategies: Upright positioning for all PO intake    Recommended frequency/duration:  Skilled SLP services recommended: No    SUBJECTIVE    PMHx relevant to rehab: NSTEMI, aspiration pneumonia following intractable vomiting.     Chief complaint: Pt was admitted on 24 due to NSTEMI    Relevant imaging results:  24 chest birthday \"IMPRESSION:  No acute cardiopulmonary process.\"    General Visit Information:  SLP Received On: 24  Patient Class: Inpatient  Living Environment: Home  Ordering Physician: Dr Hernandez  Reason for Referral: Aspiration pneumonia  Prior to Session Communication: Bedside nurse    RN cleared pt to participate in session and reported no difficulty with oral intake.    Pt denied dysphagia.   [employer] Diet: Regular/thin  Current Diet : regular/ thin    Pain Assessment  Pain Assessment: 0-10  Pain Score: 0 - No pain  Pain Type: Chronic pain  Pain Location: Back  Pain Orientation: Right    Pt was alert, pleasant, and cooperative for session.  Orientation: Ox4  Ability to follow functional commands: WFL  Nutritional status: Appears well-nourished/no concerns    Respiratory status: Room air  Baseline Vocal Quality: Normal  Volitional Cough: Strong  Volitional Swallow: Within Functional Limits  Patient positioning: Upright in " bed      OBJECTIVE  Clinical swallow evaluation completed and consisted of interview, oral motor assessment, and PO trials (thin liquids, solids).  ORAL PHASE: Natural dentition in wfl condition. Oral mucosa were pink, moist, and free of obvious lesions. Lingual strength and ROM were wfl. Labial strength/ROM were wfl. Labial seal was adequate. Mastication of regular solids was good. A/P transit and oral clearance were wfl.  PHARYNGEAL PHASE: Laryngeal elevation was visualized or palpated with all trials, however adequacy of hyolaryngeal elevation/excursion cannot be determined at bedside. No immediate or delayed s/sx aspiration/penetration were observed with any consistencies.    Was 3oz challenge administered: Yes; pt drank 3oz of thin liquid in one attempt, without breaking, with no overt s/sx aspiration/penetration observed.      Treatment/Education:  Results and recommendations were relayed to: Patient  Education provided: Yes   Learner: Patient   Barriers to learning: None   Method of teaching: Verbal   Topic: role of ST   Outcome of teaching: Pt/family demonstrated good understanding  Treatment provided: No

## 2024-05-21 NOTE — PROGRESS NOTES
Azucena Hagan is a 53 y.o. female on day 3 of admission presenting with NSTEMI (non-ST elevated myocardial infarction) (Multi).    Subjective   Interval History: no fever, abdominal pain, no emesis        Review of Systems    Objective   Range of Vitals (last 24 hours)  Heart Rate:  [78-87]   Temp:  [35.8 °C (96.4 °F)-36.5 °C (97.7 °F)]   Resp:  [10-20]   BP: ()/(46-76)   SpO2:  [97 %-100 %]   Daily Weight  05/18/24 : 54 kg (119 lb)    Body mass index is 21.76 kg/m².    Physical Exam  Constitutional:       Appearance: Normal appearance.   HENT:      Head: Normocephalic and atraumatic.      Mouth/Throat:      Mouth: Mucous membranes are moist.      Pharynx: Oropharynx is clear.   Eyes:      Pupils: Pupils are equal, round, and reactive to light.   Cardiovascular:      Rate and Rhythm: Normal rate and regular rhythm.      Heart sounds: Normal heart sounds.   Pulmonary:      Effort: Pulmonary effort is normal.      Breath sounds: Normal breath sounds.   Abdominal:      General: Abdomen is flat. Bowel sounds are normal.      Palpations: Abdomen is soft.      Tenderness: There is abdominal tenderness.   Musculoskeletal:      Cervical back: Normal range of motion.   Neurological:      Mental Status: She is alert.         Antibiotics  ALPRAZolam (Xanax) tablet 1 mg  cyclobenzaprine (Flexeril) tablet 10 mg  levothyroxine (Synthroid, Levoxyl) tablet 100 mcg  temazepam (Restoril) capsule 30 mg  topiramate (Topamax) tablet 150 mg  insulin glargine (Lantus) injection 35 Units  glucagon (Glucagen) injection 1 mg  dextrose 50 % injection 25 g  glucagon (Glucagen) injection 1 mg  dextrose 50 % injection 12.5 g  insulin lispro (HumaLOG) injection 0-15 Units  heparin bolus from bag 3,102 Units  heparin 25,000 Units in dextrose 5% 250 mL (100 Units/mL) infusion (premix)  heparin bolus from bag 1,500-3,000 Units  metoprolol tartrate (Lopressor) injection 5 mg  piperacillin-tazobactam-dextrose (Zosyn) IV 3.375  g  metoclopramide (Reglan) injection 10 mg  sodium chloride 0.9% infusion  topiramate (Topamax) tablet 200 mg  atorvastatin (Lipitor) tablet 40 mg  aspirin chewable tablet 81 mg  metoprolol tartrate (Lopressor) tablet 25 mg  insulin glargine (Lantus) injection 35 Units  insulin lispro (HumaLOG) injection 0-15 Units  potassium chloride CR (Klor-Con M20) ER tablet 40 mEq  insulin lispro (HumaLOG) injection 0-15 Units  LORazepam (Ativan) injection  - Omnicell Override Pull  LORazepam (Ativan) injection 1 mg  dextrose 50 % injection 25 g  insulin glargine (Lantus) injection 20 Units  potassium chloride (Klor-Con) packet 60 mEq  potassium chloride CR (Klor-Con M20) ER tablet 60 mEq  potassium chloride CR (Klor-Con M20) ER tablet 60 mEq  potassium chloride 20 mEq in 100 mL IV premix  magnesium sulfate IV 2 g      Relevant Results  Labs  Results from last 72 hours   Lab Units 05/21/24  0605 05/20/24  0044 05/19/24  0554   WBC AUTO x10*3/uL 10.2 13.1* 11.1   HEMOGLOBIN g/dL 11.8* 12.2 10.6*   HEMATOCRIT % 35.8* 35.3* 30.9*   PLATELETS AUTO x10*3/uL 346 353 325   NEUTROS PCT AUTO % 58.9 69.0 78.6   LYMPHS PCT AUTO % 29.5 23.4 16.0   MONOS PCT AUTO % 6.7 6.1 3.9   EOS PCT AUTO % 2.6 0.2 0.0     Results from last 72 hours   Lab Units 05/21/24  0605 05/20/24  1553 05/20/24  0530 05/20/24  0044 05/19/24  0554   SODIUM mmol/L 135*  --  137  --  137   POTASSIUM mmol/L 3.5 3.6 2.7*   < > 2.8*   CHLORIDE mmol/L 109*  --  103  --  101   CO2 mmol/L 16*  --  23  --  25   BUN mg/dL 11  --  14  --  19   CREATININE mg/dL 0.83  --  0.86  --  0.80   GLUCOSE mg/dL 82  --  104*  --  177*   CALCIUM mg/dL 7.2*  --  7.6*  --  8.2*   ANION GAP mmol/L 14  --  14  --  14   EGFR mL/min/1.73m*2 85  --  82  --  89    < > = values in this interval not displayed.     Results from last 72 hours   Lab Units 05/21/24  0605 05/20/24  0530 05/19/24  0554   ALK PHOS U/L 59 71 68   BILIRUBIN TOTAL mg/dL 0.6 0.7 0.7   PROTEIN TOTAL g/dL 5.1* 5.5* 6.2*   ALT U/L  33 46* 52*   AST U/L 16 32 33   ALBUMIN g/dL 2.7* 2.9* 3.3*     Estimated Creatinine Clearance: 63.4 mL/min (by C-G formula based on SCr of 0.83 mg/dL).  C-Reactive Protein   Date Value Ref Range Status   05/19/2024 9.72 (H) <1.00 mg/dL Final   11/22/2023 0.30 <1.00 mg/dL Final     CRP   Date Value Ref Range Status   06/20/2023 0.56 mg/dL Final     Comment:     REF VALUE  < 1.00       Microbiology  reviewed  Imaging  reviewed        Assessment/Plan    Suspected aspiration pneumonia  Abdominal pain, better, US is negative      Recommendations :  Continue Zosyn   Discussed with the medical team    I spent minutes in the professional and overall care of this patient.      Khari Williamson MD

## 2024-05-21 NOTE — PROGRESS NOTES
"Azucena Hagan is a 53 y.o. female on day 3 of admission presenting with NSTEMI (non-ST elevated myocardial infarction) (Multi).    Subjective   No new complaint  Glargine decreased again last night to 20 units     Scheduled medications  aspirin, 81 mg, oral, Daily  atorvastatin, 40 mg, oral, Nightly  insulin glargine, 20 Units, subcutaneous, q PM  insulin lispro, 0-15 Units, subcutaneous, TID  levothyroxine, 100 mcg, oral, Daily before breakfast  LORazepam, 1 mg, intravenous, Once  metoprolol tartrate, 25 mg, oral, BID  piperacillin-tazobactam, 3.375 g, intravenous, q6h  topiramate, 300 mg, oral, Daily      Continuous medications  sodium chloride 0.9%, 100 mL/hr, Last Rate: 100 mL/hr (05/21/24 0454)      Objective   Physical Exam  Constitutional:       General: She is not in acute distress.  Neurological:      Mental Status: She is alert.         Last Recorded Vitals  Blood pressure 128/82, pulse 87, temperature 36 °C (96.8 °F), temperature source Temporal, resp. rate 19, height 1.575 m (5' 2.01\"), weight 54 kg (119 lb), SpO2 100%.  Intake/Output last 3 Shifts:  I/O last 3 completed shifts:  In: 5223.9 (96.8 mL/kg) [I.V.:4823.9 (89.4 mL/kg); IV Piggyback:400]  Out: 760 (14.1 mL/kg) [Urine:750 (0.4 mL/kg/hr); Blood:10]  Weight: 54 kg     Relevant Results  Results from last 7 days   Lab Units 05/21/24  0605 05/20/24  0530 05/19/24  0810 05/19/24  0554 05/19/24  0439 05/19/24  0008 05/18/24  1949 05/18/24  1605 05/18/24  1522 05/18/24  0625   POCT GLUCOSE mg/dL  --   --  181*  --  163* 121* 267* 301*  --   --    GLUCOSE mg/dL 82 104*  --  177*  --   --   --   --  304* 317*      Latest Reference Range & Units 05/21/24 06:05   GLUCOSE 74 - 99 mg/dL 82   SODIUM 136 - 145 mmol/L 135 (L)   POTASSIUM 3.5 - 5.3 mmol/L 3.5   CHLORIDE 98 - 107 mmol/L 109 (H)   Bicarbonate 21 - 32 mmol/L 16 (L)   Anion Gap 10 - 20 mmol/L 14   Blood Urea Nitrogen 6 - 23 mg/dL 11   Creatinine 0.50 - 1.05 mg/dL 0.83   EGFR >60 mL/min/1.73m*2 85 "   Calcium 8.6 - 10.3 mg/dL 7.2 (L)   Albumin 3.4 - 5.0 g/dL 2.7 (L)   Alkaline Phosphatase 33 - 110 U/L 59   ALT 7 - 45 U/L 33   AST 9 - 39 U/L 16   Bilirubin Total 0.0 - 1.2 mg/dL 0.6       Assessment/Plan   Principal Problem:    NSTEMI (non-ST elevated myocardial infarction) (Multi)      IMPRESSION:  TYPE 2 DIABETES MELLITUS WITH HYPERGLYCEMIA  LONG TERM CURRENT INSULIN USE  Chronically poor glucose control on basal insulin only  Stated allergy to aspart (Novolog)     RECOMMENDATIONS  Continue glargine 20 units at bedtime tonight  Lispro scale.  She may need prandial lispro at discharge    Please not patient has a history of diabetic ketoacidosis and may not be a candidate for SGLT2-inhibitor due to risk of ketoacidosis.       Ramesh Diaz MD

## 2024-05-22 ENCOUNTER — DOCUMENTATION (OUTPATIENT)
Dept: HOME HEALTH SERVICES | Facility: HOME HEALTH | Age: 54
End: 2024-05-22
Payer: COMMERCIAL

## 2024-05-22 ENCOUNTER — HOME HEALTH ADMISSION (OUTPATIENT)
Dept: HOME HEALTH SERVICES | Facility: HOME HEALTH | Age: 54
End: 2024-05-22
Payer: COMMERCIAL

## 2024-05-22 ENCOUNTER — APPOINTMENT (OUTPATIENT)
Dept: CARDIOLOGY | Facility: HOSPITAL | Age: 54
DRG: 286 | End: 2024-05-22
Payer: COMMERCIAL

## 2024-05-22 LAB
ALBUMIN SERPL BCP-MCNC: 2.7 G/DL (ref 3.4–5)
ALP SERPL-CCNC: 56 U/L (ref 33–110)
ALT SERPL W P-5'-P-CCNC: 28 U/L (ref 7–45)
ANION GAP SERPL CALC-SCNC: 12 MMOL/L (ref 10–20)
ANION GAP SERPL CALC-SCNC: 13 MMOL/L (ref 10–20)
AST SERPL W P-5'-P-CCNC: 14 U/L (ref 9–39)
BASOPHILS # BLD AUTO: 0.06 X10*3/UL (ref 0–0.1)
BASOPHILS NFR BLD AUTO: 0.5 %
BILIRUB SERPL-MCNC: 0.6 MG/DL (ref 0–1.2)
BUN SERPL-MCNC: 10 MG/DL (ref 6–23)
BUN SERPL-MCNC: 9 MG/DL (ref 6–23)
CALCIUM SERPL-MCNC: 7.5 MG/DL (ref 8.6–10.3)
CALCIUM SERPL-MCNC: 7.6 MG/DL (ref 8.6–10.3)
CARDIAC TROPONIN I PNL SERPL HS: 110 NG/L (ref 0–13)
CHLORIDE SERPL-SCNC: 107 MMOL/L (ref 98–107)
CHLORIDE SERPL-SCNC: 109 MMOL/L (ref 98–107)
CO2 SERPL-SCNC: 17 MMOL/L (ref 21–32)
CO2 SERPL-SCNC: 17 MMOL/L (ref 21–32)
CREAT SERPL-MCNC: 0.95 MG/DL (ref 0.5–1.05)
CREAT SERPL-MCNC: 0.95 MG/DL (ref 0.5–1.05)
D DIMER PPP FEU-MCNC: 753 NG/ML FEU
EGFRCR SERPLBLD CKD-EPI 2021: 73 ML/MIN/1.73M*2
EGFRCR SERPLBLD CKD-EPI 2021: 73 ML/MIN/1.73M*2
EOSINOPHIL # BLD AUTO: 0.42 X10*3/UL (ref 0–0.7)
EOSINOPHIL NFR BLD AUTO: 3.8 %
ERYTHROCYTE [DISTWIDTH] IN BLOOD BY AUTOMATED COUNT: 13.5 % (ref 11.5–14.5)
ERYTHROCYTE [DISTWIDTH] IN BLOOD BY AUTOMATED COUNT: 13.9 % (ref 11.5–14.5)
EST. AVERAGE GLUCOSE BLD GHB EST-MCNC: 183 MG/DL
GLUCOSE SERPL-MCNC: 118 MG/DL (ref 74–99)
GLUCOSE SERPL-MCNC: 128 MG/DL (ref 74–99)
HBA1C MFR BLD: 8 %
HCT VFR BLD AUTO: 39.3 % (ref 36–46)
HCT VFR BLD AUTO: 41.7 % (ref 36–46)
HGB BLD-MCNC: 13.3 G/DL (ref 12–16)
HGB BLD-MCNC: 13.4 G/DL (ref 12–16)
IMM GRANULOCYTES # BLD AUTO: 0.52 X10*3/UL (ref 0–0.7)
IMM GRANULOCYTES NFR BLD AUTO: 4.7 % (ref 0–0.9)
LYMPHOCYTES # BLD AUTO: 3.24 X10*3/UL (ref 1.2–4.8)
LYMPHOCYTES NFR BLD AUTO: 29.2 %
MAGNESIUM SERPL-MCNC: 1.71 MG/DL (ref 1.6–2.4)
MAGNESIUM SERPL-MCNC: 1.85 MG/DL (ref 1.6–2.4)
MCH RBC QN AUTO: 29.5 PG (ref 26–34)
MCH RBC QN AUTO: 30 PG (ref 26–34)
MCHC RBC AUTO-ENTMCNC: 32.1 G/DL (ref 32–36)
MCHC RBC AUTO-ENTMCNC: 33.8 G/DL (ref 32–36)
MCV RBC AUTO: 89 FL (ref 80–100)
MCV RBC AUTO: 92 FL (ref 80–100)
MONOCYTES # BLD AUTO: 0.74 X10*3/UL (ref 0.1–1)
MONOCYTES NFR BLD AUTO: 6.7 %
NEUTROPHILS # BLD AUTO: 6.1 X10*3/UL (ref 1.2–7.7)
NEUTROPHILS NFR BLD AUTO: 55.1 %
NRBC BLD-RTO: 0 /100 WBCS (ref 0–0)
NRBC BLD-RTO: 0.2 /100 WBCS (ref 0–0)
PLATELET # BLD AUTO: 340 X10*3/UL (ref 150–450)
PLATELET # BLD AUTO: 363 X10*3/UL (ref 150–450)
POTASSIUM SERPL-SCNC: 3.6 MMOL/L (ref 3.5–5.3)
POTASSIUM SERPL-SCNC: 4 MMOL/L (ref 3.5–5.3)
PROT SERPL-MCNC: 5.1 G/DL (ref 6.4–8.2)
RBC # BLD AUTO: 4.44 X10*6/UL (ref 4–5.2)
RBC # BLD AUTO: 4.54 X10*6/UL (ref 4–5.2)
SODIUM SERPL-SCNC: 132 MMOL/L (ref 136–145)
SODIUM SERPL-SCNC: 135 MMOL/L (ref 136–145)
WBC # BLD AUTO: 11.1 X10*3/UL (ref 4.4–11.3)
WBC # BLD AUTO: 11.1 X10*3/UL (ref 4.4–11.3)

## 2024-05-22 PROCEDURE — 85027 COMPLETE CBC AUTOMATED: CPT | Performed by: FAMILY MEDICINE

## 2024-05-22 PROCEDURE — 84484 ASSAY OF TROPONIN QUANT: CPT | Performed by: FAMILY MEDICINE

## 2024-05-22 PROCEDURE — 2060000001 HC INTERMEDIATE ICU ROOM DAILY

## 2024-05-22 PROCEDURE — 36415 COLL VENOUS BLD VENIPUNCTURE: CPT | Performed by: STUDENT IN AN ORGANIZED HEALTH CARE EDUCATION/TRAINING PROGRAM

## 2024-05-22 PROCEDURE — 99231 SBSQ HOSP IP/OBS SF/LOW 25: CPT | Performed by: INTERNAL MEDICINE

## 2024-05-22 PROCEDURE — 2500000001 HC RX 250 WO HCPCS SELF ADMINISTERED DRUGS (ALT 637 FOR MEDICARE OP): Performed by: FAMILY MEDICINE

## 2024-05-22 PROCEDURE — 36415 COLL VENOUS BLD VENIPUNCTURE: CPT | Performed by: FAMILY MEDICINE

## 2024-05-22 PROCEDURE — 83735 ASSAY OF MAGNESIUM: CPT | Performed by: FAMILY MEDICINE

## 2024-05-22 PROCEDURE — 2500000001 HC RX 250 WO HCPCS SELF ADMINISTERED DRUGS (ALT 637 FOR MEDICARE OP): Performed by: STUDENT IN AN ORGANIZED HEALTH CARE EDUCATION/TRAINING PROGRAM

## 2024-05-22 PROCEDURE — 80053 COMPREHEN METABOLIC PANEL: CPT | Performed by: STUDENT IN AN ORGANIZED HEALTH CARE EDUCATION/TRAINING PROGRAM

## 2024-05-22 PROCEDURE — 80048 BASIC METABOLIC PNL TOTAL CA: CPT | Mod: CCI | Performed by: FAMILY MEDICINE

## 2024-05-22 PROCEDURE — 85379 FIBRIN DEGRADATION QUANT: CPT | Performed by: FAMILY MEDICINE

## 2024-05-22 PROCEDURE — 2500000004 HC RX 250 GENERAL PHARMACY W/ HCPCS (ALT 636 FOR OP/ED): Performed by: FAMILY MEDICINE

## 2024-05-22 PROCEDURE — 2500000002 HC RX 250 W HCPCS SELF ADMINISTERED DRUGS (ALT 637 FOR MEDICARE OP, ALT 636 FOR OP/ED): Performed by: INTERNAL MEDICINE

## 2024-05-22 PROCEDURE — 93005 ELECTROCARDIOGRAM TRACING: CPT

## 2024-05-22 PROCEDURE — 99232 SBSQ HOSP IP/OBS MODERATE 35: CPT | Performed by: FAMILY MEDICINE

## 2024-05-22 PROCEDURE — 2500000004 HC RX 250 GENERAL PHARMACY W/ HCPCS (ALT 636 FOR OP/ED): Performed by: STUDENT IN AN ORGANIZED HEALTH CARE EDUCATION/TRAINING PROGRAM

## 2024-05-22 PROCEDURE — 85025 COMPLETE CBC W/AUTO DIFF WBC: CPT | Performed by: STUDENT IN AN ORGANIZED HEALTH CARE EDUCATION/TRAINING PROGRAM

## 2024-05-22 RX ORDER — SPIRONOLACTONE 25 MG/1
12.5 TABLET ORAL DAILY
Qty: 30 TABLET | Refills: 0 | Status: SHIPPED | OUTPATIENT
Start: 2024-05-22 | End: 2024-05-24 | Stop reason: HOSPADM

## 2024-05-22 RX ORDER — FUROSEMIDE 20 MG/1
20 TABLET ORAL DAILY PRN
Qty: 30 TABLET | Refills: 0 | Status: SHIPPED | OUTPATIENT
Start: 2024-05-22 | End: 2024-05-24 | Stop reason: HOSPADM

## 2024-05-22 RX ORDER — NOREPINEPHRINE BITARTRATE 0.03 MG/ML
INJECTION, SOLUTION INTRAVENOUS
Status: DISPENSED
Start: 2024-05-22 | End: 2024-05-22

## 2024-05-22 RX ORDER — INSULIN LISPRO 100 [IU]/ML
0-15 INJECTION, SOLUTION INTRAVENOUS; SUBCUTANEOUS
Qty: 15 ML | Refills: 0 | Status: SHIPPED | OUTPATIENT
Start: 2024-05-22

## 2024-05-22 RX ORDER — METOPROLOL SUCCINATE 25 MG/1
12.5 TABLET, EXTENDED RELEASE ORAL DAILY
Qty: 30 TABLET | Refills: 0 | Status: SHIPPED | OUTPATIENT
Start: 2024-05-22 | End: 2024-05-24 | Stop reason: HOSPADM

## 2024-05-22 RX ORDER — METOPROLOL SUCCINATE 25 MG/1
12.5 TABLET, EXTENDED RELEASE ORAL DAILY
Status: DISCONTINUED | OUTPATIENT
Start: 2024-05-23 | End: 2024-05-23

## 2024-05-22 RX ORDER — INSULIN GLARGINE-YFGN 100 [IU]/ML
20 INJECTION, SOLUTION SUBCUTANEOUS NIGHTLY
Qty: 90 ML | Refills: 0 | Status: SHIPPED | OUTPATIENT
Start: 2024-05-22

## 2024-05-22 RX ORDER — AMOXICILLIN AND CLAVULANATE POTASSIUM 875; 125 MG/1; MG/1
1 TABLET, FILM COATED ORAL 2 TIMES DAILY
Qty: 4 TABLET | Refills: 0 | Status: SHIPPED | OUTPATIENT
Start: 2024-05-22 | End: 2024-05-24 | Stop reason: HOSPADM

## 2024-05-22 RX ORDER — METOPROLOL SUCCINATE 25 MG/1
25 TABLET, EXTENDED RELEASE ORAL DAILY
Status: DISCONTINUED | OUTPATIENT
Start: 2024-05-22 | End: 2024-05-22

## 2024-05-22 RX ORDER — IBUPROFEN 200 MG
600 TABLET ORAL ONCE
Status: COMPLETED | OUTPATIENT
Start: 2024-05-22 | End: 2024-05-22

## 2024-05-22 RX ADMIN — LEVOTHYROXINE SODIUM 100 MCG: 100 TABLET ORAL at 07:36

## 2024-05-22 RX ADMIN — PIPERACILLIN SODIUM AND TAZOBACTAM SODIUM 3.38 G: 3; .375 INJECTION, SOLUTION INTRAVENOUS at 10:26

## 2024-05-22 RX ADMIN — SODIUM CHLORIDE 250 ML: 9 INJECTION, SOLUTION INTRAVENOUS at 09:33

## 2024-05-22 RX ADMIN — PIPERACILLIN SODIUM AND TAZOBACTAM SODIUM 3.38 G: 3; .375 INJECTION, SOLUTION INTRAVENOUS at 05:12

## 2024-05-22 RX ADMIN — IBUPROFEN 600 MG: 200 TABLET, FILM COATED ORAL at 12:39

## 2024-05-22 RX ADMIN — SODIUM CHLORIDE 1000 ML: 9 INJECTION, SOLUTION INTRAVENOUS at 15:28

## 2024-05-22 RX ADMIN — INSULIN GLARGINE 20 UNITS: 100 INJECTION, SOLUTION SUBCUTANEOUS at 20:54

## 2024-05-22 RX ADMIN — SODIUM CHLORIDE 250 ML: 9 INJECTION, SOLUTION INTRAVENOUS at 11:46

## 2024-05-22 ASSESSMENT — COGNITIVE AND FUNCTIONAL STATUS - GENERAL
MOVING TO AND FROM BED TO CHAIR: A LITTLE
DRESSING REGULAR UPPER BODY CLOTHING: A LITTLE
CLIMB 3 TO 5 STEPS WITH RAILING: A LOT
MOVING FROM LYING ON BACK TO SITTING ON SIDE OF FLAT BED WITH BEDRAILS: A LITTLE
WALKING IN HOSPITAL ROOM: A LOT
MOBILITY SCORE: 16
PERSONAL GROOMING: A LITTLE
HELP NEEDED FOR BATHING: A LITTLE
DAILY ACTIVITIY SCORE: 18
TURNING FROM BACK TO SIDE WHILE IN FLAT BAD: A LITTLE
DRESSING REGULAR LOWER BODY CLOTHING: A LITTLE
TOILETING: A LITTLE
EATING MEALS: A LITTLE
STANDING UP FROM CHAIR USING ARMS: A LITTLE

## 2024-05-22 ASSESSMENT — PAIN SCALES - GENERAL: PAINLEVEL_OUTOF10: 0 - NO PAIN

## 2024-05-22 ASSESSMENT — PAIN - FUNCTIONAL ASSESSMENT: PAIN_FUNCTIONAL_ASSESSMENT: 0-10

## 2024-05-22 NOTE — PROGRESS NOTES
Subjective Data:  Patient doing well overall. Complains of feeling fatigued.   Denies any chest pain, chest pressure, palpitations, dizziness, cough, shortness of breath, orthopnea, edema.      Overnight Events:    No acute issues reported overnight.      Objective Data:  Last Recorded Vitals:  Vitals:    05/21/24 1458 05/21/24 1600 05/21/24 1800 05/21/24 2113   BP: 122/81      BP Location: Right arm      Patient Position: Lying      Pulse:  91 100 105   Resp:  21 18    Temp: 36.6 °C (97.9 °F)   36.1 °C (97 °F)   TempSrc:    Temporal   SpO2: 100%   100%   Weight:       Height:           Last Labs:  CBC - 5/21/2024:  6:05 AM  10.2 11.8 346    35.8      CMP - 5/21/2024:  6:05 AM  7.2 5.1 16 --- 0.6   1.3; CANCELED 2.7 33 59      PTT - 5/18/2024:  6:25 AM  1.1   12.5 20     TROPHS   Date/Time Value Ref Range Status   05/20/2024 05:30  0 - 13 ng/L Final   05/19/2024 05:54 AM 1,170 0 - 13 ng/L Final   05/18/2024 03:22 PM 1,049 0 - 13 ng/L Final     Comment:     Previous result verified on 5/18/2024 0725 on specimen/case [id number] called with component [facility] for procedure Troponin I, High Sensitivity, Initial with value 330 ng/L.     HGBA1C   Date/Time Value Ref Range Status   10/24/2023 02:56 PM 9.5 4.2 - 6.5 % Final   06/21/2023 05:52 AM 13.6 % Final     Comment:          Diagnosis of Diabetes-Adults   Non-Diabetic: < or = 5.6%   Increased risk for developing diabetes: 5.7-6.4%   Diagnostic of diabetes: > or = 6.5%  .       Monitoring of Diabetes                Age (y)     Therapeutic Goal (%)   Adults:          >18           <7.0   Pediatrics:    13-18           <7.5                   7-12           <8.0                   0- 6            7.5-8.5   American Diabetes Association. Diabetes Care 33(S1), Jan 2010.     06/20/2023 06:39 AM 13.8 % Final     Comment:          Diagnosis of Diabetes-Adults   Non-Diabetic: < or = 5.6%   Increased risk for developing diabetes: 5.7-6.4%   Diagnostic of diabetes: > or =  "6.5%  .       Monitoring of Diabetes                Age (y)     Therapeutic Goal (%)   Adults:          >18           <7.0   Pediatrics:    13-18           <7.5                   7-12           <8.0                   0- 6            7.5-8.5   American Diabetes Association. Diabetes Care 33(S1), Jan 2010.       LDLCALC   Date/Time Value Ref Range Status   05/19/2024 05:54  <=99 mg/dL Final     Comment:                                 Near   Borderline      AGE      Desirable  Optimal    High     High     Very High     0-19 Y     0 - 109     ---    110-129   >/= 130     ----    20-24 Y     0 - 119     ---    120-159   >/= 160     ----      >24 Y     0 -  99   100-129  130-159   160-189     >/=190       VLDL   Date/Time Value Ref Range Status   05/19/2024 05:54 AM 20 0 - 40 mg/dL Final      Last I/O:  I/O last 3 completed shifts:  In: 4955.4 (91.8 mL/kg) [P.O.:480; I.V.:4125.4 (76.4 mL/kg); IV Piggyback:350]  Out: 610 (11.3 mL/kg) [Urine:600 (0.3 mL/kg/hr); Blood:10]  Weight: 54 kg     Past Cardiology Tests (Last 3 Years):  EKG:  ECG 12 lead 05/18/2024 (Preliminary)  ECG 12 lead 05/18/2024 (Preliminary)  ECG 12 lead 05/18/2024 (Preliminary)  ECG 12 lead 12/05/2023 (Preliminary)  ECG 12 lead 12/05/2023 (Preliminary)  ECG 12 lead 12/04/2023 (Preliminary)     Echo:  Transthoracic Echocardiogram (05/20/24):    CONCLUSIONS:   1. Left ventricular systolic function is moderately to severely decreased with a 30% estimated ejection fraction.   2. There is global hypokinesis of the left ventricle with minor regional variations.     Ejection Fractions:  No results found for: \"EF\"  Cath:  No results found for this or any previous visit from the past 1095 days.     Stress Test:  No results found for this or any previous visit from the past 1095 days.     Imaging:  US RUQ (05/19/2024):   IMPRESSION:  No cholelithiasis or sonographic evidence of acute cholecystitis.     CT Head w/o (05/18/2024):   IMPRESSION:  No CT evidence " for acute intracranial pathology.     CT Abd/pelvis (05/18/2024):   IMPRESSION:  No CT evidence for acute pathology within the abdomen or pelvis.      CT Angio Chest for PE (05/18/20240:   IMPRESSION:  1. No pulmonary embolus.  2. Small patchy airspace opacity in the right lower lobe with  tree-in-bud opacities, and also present to a lesser degree in the  left lower lobe and lateral segment of the right middle lobe.  Findings are concerning for atypical or early multifocal pneumonia.  Aspiration could also have this appearance given the findings of  severe reflux. The esophagus is patulous with extensive fluid  throughout the esophagus.      CXR (05/18/2024):  IMPRESSION:  No acute cardiopulmonary process.    Inpatient Medications:  Scheduled medications   Medication Dose Route Frequency    [START ON 5/22/2024] atorvastatin  80 mg oral Nightly    insulin glargine  20 Units subcutaneous q PM    insulin lispro  0-15 Units subcutaneous TID    levothyroxine  100 mcg oral Daily before breakfast    [START ON 5/22/2024] metoprolol succinate XL  50 mg oral Daily    piperacillin-tazobactam  3.375 g intravenous q6h    sacubitriL-valsartan  1 tablet oral BID    spironolactone  12.5 mg oral Daily    topiramate  300 mg oral Daily     PRN medications   Medication    acetaminophen    Or    acetaminophen    Or    acetaminophen    ALPRAZolam    cyclobenzaprine    dextrose    dextrose    glucagon    glucagon    metoclopramide    temazepam     Continuous Medications   Medication Dose Last Rate       Physical Exam:  Physical Exam  HENT:      Head: Normocephalic.      Nose: Nose normal.      Mouth/Throat:      Mouth: Mucous membranes are moist.   Eyes:      Conjunctiva/sclera: Conjunctivae normal.   Neck:      Comments: No JVD  Cardiovascular:      Rate and Rhythm: Normal rate and regular rhythm.      Heart sounds: No murmur heard.  Pulmonary:      Effort: Pulmonary effort is normal.      Breath sounds: Normal breath sounds.    Abdominal:      Palpations: Abdomen is soft.   Musculoskeletal:      Right lower leg: No edema.      Left lower leg: No edema.   Skin:     General: Skin is warm.      Capillary Refill: Capillary refill takes less than 2 seconds. RUE access site w/ dressing c/d/I. No erythema, edema or exudate. Scant bleeding. Pulses equal and intact, capillary refill brisk.   Neurological:      General: No focal deficit present.      Mental Status: She is alert. Mental status is at baseline.   Psychiatric:         Mood and Affect: Mood normal.         Behavior: Behavior normal.            Assessment/Plan   Azucena Hagan is a 52 yo female with a PMH of DM Type II, Hypothyroidism, DDD, Anxiety who presented tot he ED with vomiting, nausea and R sided chest pain. Was admitted for c/f of pneumonia, NSTEMI. Trop 330> 655> 1049 >1170> 644     #Acute systolic heart failure, NICM (EF 30%)   #Non-MI troponin elevation 2/2 above  #N/V, Abd pain (resolved)  #Pneumonia (concern for aspiration vs gram negative)  #Hypokalemia, hypomagnesemia  #DM Type II     -Replete electrolytes per protocol  Goal K4/Phos3/Mg2  -LHC completed without obstructive CAD  -echocardiogram recommended, reviewed with mod-severe LVSF dysfunction, EF 30%.   -d/c Heparin gtt   -Can d/c ASA, Increase atorvastatin to 80mg daily   -Change Metoprolol to succinate 50mg daily   -Start Entresto 24/26 BID, Spironolactone 25mg daily  -Euvolemic will hold off on Lasix  -No SGLT2 at this time given risk of DKA  -Recommend cardiac MRI outpatient, will arrange for HF follow up, would like to follow with Dr. Guido for general cardiology.   -Will follow     Peripheral IV 05/18/24 20 G Left Antecubital (Active)   Site Assessment Clean;Dry;Intact 05/21/24 2100   Dressing Status Clean;Dry 05/21/24 2100   Number of days: 3       Peripheral IV 05/18/24 20 G Left Forearm (Active)   Site Assessment Clean;Dry;Intact 05/21/24 2100   Dressing Status Clean;Dry 05/21/24 2100   Number of days: 3        Code Status:  DNR and No Intubation    I spent  minutes in the professional and overall care of this patient.        Olga Ferreira PA-C

## 2024-05-22 NOTE — PROGRESS NOTES
05/22/24 1132   Discharge Planning   Living Arrangements Spouse/significant other   Support Systems Spouse/significant other   Assistance Needed A&0x3, ambulates with walker, uses wheelchair PRN, has BSC, showerchair,  room air, no cpap or bipap, not active with any HHC, 2 story house and bedroom is on 2nd floor. Patient stays only on 2nd flooor, spouse works in the day, spouse brings food upstiars while he is at work.   Type of Residence Private residence   Number of Stairs to Enter Residence 5   Number of Stairs Within Residence 19   Do you have animals or pets at home? No   Who is requesting discharge planning? Provider   Home or Post Acute Services In home services   Type of Home Care Services Home nursing visits;Home OT;Home PT   Patient expects to be discharged to: Home with new [facility] ( SN, PT, OT).  MD sent internal referral , room air, ADOD today or tomorrow   Does the patient need discharge transport arranged? No   Patient Choice   Provider Choice list and CMS website (https://medicare.gov/care-compare#search) for post-acute Quality and Resource Measure Data were provided and reviewed with: Patient

## 2024-05-22 NOTE — HH CARE COORDINATION
Home Care received a Referral for Nursing, Physical Therapy, and Occupational Therapy. We have processed the referral for a Start of Care on 05/23-05/24.     If you have any questions or concerns, please feel free to contact us at [phone]. Follow the prompts, enter your five digit zip code, and you will be directed to your care team on E[facility] 1.

## 2024-05-22 NOTE — PROGRESS NOTES
"Azucena Hagan is a 53 y.o. female on day 4 of admission presenting with NSTEMI (non-ST elevated myocardial infarction) (Multi).    Subjective   No new complaints  She does not like the hospital food     Scheduled medications  atorvastatin, 80 mg, oral, Nightly  insulin glargine, 20 Units, subcutaneous, q PM  insulin lispro, 0-15 Units, subcutaneous, TID  levothyroxine, 100 mcg, oral, Daily before breakfast  metoprolol succinate XL, 50 mg, oral, Daily  piperacillin-tazobactam, 3.375 g, intravenous, q6h  sacubitriL-valsartan, 1 tablet, oral, BID  spironolactone, 12.5 mg, oral, Daily  topiramate, 300 mg, oral, Daily        Objective   Physical Exam  Constitutional:       General: She is not in acute distress.  Neurological:      Mental Status: She is alert.         Last Recorded Vitals  Blood pressure 95/62, pulse 92, temperature 36.4 °C (97.5 °F), temperature source Temporal, resp. rate 16, height 1.575 m (5' 2.01\"), weight 54 kg (119 lb), SpO2 95%.  Intake/Output last 3 Shifts:  I/O last 3 completed shifts:  In: 3182.1 (59 mL/kg) [P.O.:840; I.V.:2092.1 (38.8 mL/kg); IV Piggyback:250]  Out: 0 (0 mL/kg)   Weight: 54 kg     Relevant Results  Results from last 7 days   Lab Units 05/22/24  0536 05/21/24  0605 05/20/24  0530 05/19/24  0810 05/19/24  0554 05/19/24  0439 05/19/24  0008 05/18/24  1949 05/18/24  1605 05/18/24  1522   POCT GLUCOSE mg/dL  --   --   --  181*  --  163* 121* 267* 301*  --    GLUCOSE mg/dL 118* 82 104*  --  177*  --   --   --   --  304*      Latest Reference Range & Units 05/22/24 05:36   GLUCOSE 74 - 99 mg/dL 118 (H)   SODIUM 136 - 145 mmol/L 132 (L)   POTASSIUM 3.5 - 5.3 mmol/L 3.6   CHLORIDE 98 - 107 mmol/L 107   Bicarbonate 21 - 32 mmol/L 17 (L)   Anion Gap 10 - 20 mmol/L 12   Blood Urea Nitrogen 6 - 23 mg/dL 9   Creatinine 0.50 - 1.05 mg/dL 0.95   EGFR >60 mL/min/1.73m*2 73   Calcium 8.6 - 10.3 mg/dL 7.6 (L)   Albumin 3.4 - 5.0 g/dL 2.7 (L)   Alkaline Phosphatase 33 - 110 U/L 56   ALT 7 - 45 " U/L 28   AST 9 - 39 U/L 14   Bilirubin Total 0.0 - 1.2 mg/dL 0.6       Assessment/Plan   Principal Problem:    NSTEMI (non-ST elevated myocardial infarction) (Multi)      IMPRESSION:  TYPE 2 DIABETES MELLITUS WITH HYPERGLYCEMIA  LONG TERM CURRENT INSULIN USE  Chronically poor glucose control on basal insulin only  Stated allergy to aspart (Novolog)  Glucose controlled despite less insulin than on admission    RECOMMENDATIONS  Continue glargine 20 units at bedtime tonight  Lispro scale QAC, continue at discharge.  She may need scheduled prandial lispro when PO intake picks up.       Ramesh Diaz MD

## 2024-05-22 NOTE — PROGRESS NOTES
Subjective Data:  Patient reports feeling fatigued, dizzy when she stood to go to the bathroom. Complains of R sided chest pain with palpation/movement.   BP low, orthostatic VS positive. IVF.     Overnight Events:    No acute issues reported overnight.      Objective Data:  Last Recorded Vitals:  Vitals:    05/21/24 1800 05/21/24 2113 05/22/24 0523 05/22/24 0756   BP:  109/65 (!) 93/49 95/62   BP Location:  Left arm Left arm    Patient Position:  Lying Lying    Pulse: 100 105 90 92   Resp: 18 18 18 16   Temp:  36.1 °C (97 °F) 36.4 °C (97.5 °F)    TempSrc:  Temporal Temporal    SpO2:  100% 98% 95%   Weight:       Height:           Last Labs:  CBC - 5/22/2024:  5:36 AM  11.1 13.3 363    39.3      CMP - 5/22/2024:  5:36 AM  7.6 5.1 14 --- 0.6   1.3; CANCELED 2.7 28 56      PTT - 5/18/2024:  6:25 AM  1.1   12.5 20     TROPHS   Date/Time Value Ref Range Status   05/20/2024 05:30  0 - 13 ng/L Final   05/19/2024 05:54 AM 1,170 0 - 13 ng/L Final   05/18/2024 03:22 PM 1,049 0 - 13 ng/L Final     Comment:     Previous result verified on 5/18/2024 0725 on specimen/case [id number] called with component [facility] for procedure Troponin I, High Sensitivity, Initial with value 330 ng/L.     HGBA1C   Date/Time Value Ref Range Status   05/20/2024 05:30 AM 8.0 see below % Final   10/24/2023 02:56 PM 9.5 4.2 - 6.5 % Final   06/21/2023 05:52 AM 13.6 % Final     Comment:          Diagnosis of Diabetes-Adults   Non-Diabetic: < or = 5.6%   Increased risk for developing diabetes: 5.7-6.4%   Diagnostic of diabetes: > or = 6.5%  .       Monitoring of Diabetes                Age (y)     Therapeutic Goal (%)   Adults:          >18           <7.0   Pediatrics:    13-18           <7.5                   7-12           <8.0                   0- 6            7.5-8.5   American Diabetes Association. Diabetes Care 33(S1), Jan 2010.     06/20/2023 06:39 AM 13.8 % Final     Comment:          Diagnosis of Diabetes-Adults   Non-Diabetic: < or =  "5.6%   Increased risk for developing diabetes: 5.7-6.4%   Diagnostic of diabetes: > or = 6.5%  .       Monitoring of Diabetes                Age (y)     Therapeutic Goal (%)   Adults:          >18           <7.0   Pediatrics:    13-18           <7.5                   7-12           <8.0                   0- 6            7.5-8.5   American Diabetes Association. Diabetes Care 33(S1), Jan 2010.       LDLCALC   Date/Time Value Ref Range Status   05/19/2024 05:54  <=99 mg/dL Final     Comment:                                 Near   Borderline      AGE      Desirable  Optimal    High     High     Very High     0-19 Y     0 - 109     ---    110-129   >/= 130     ----    20-24 Y     0 - 119     ---    120-159   >/= 160     ----      >24 Y     0 -  99   100-129  130-159   160-189     >/=190       VLDL   Date/Time Value Ref Range Status   05/19/2024 05:54 AM 20 0 - 40 mg/dL Final      Last I/O:  I/O last 3 completed shifts:  In: 3182.1 (59 mL/kg) [P.O.:840; I.V.:2092.1 (38.8 mL/kg); IV Piggyback:250]  Out: 0 (0 mL/kg)   Weight: 54 kg     Past Cardiology Tests (Last 3 Years):  EKG:  ECG 12 lead 05/18/2024 (Preliminary)  ECG 12 lead 05/18/2024 (Preliminary)  ECG 12 lead 05/18/2024 (Preliminary)  ECG 12 lead 12/05/2023 (Preliminary)  ECG 12 lead 12/05/2023 (Preliminary)  ECG 12 lead 12/04/2023 (Preliminary)     Echo:  Transthoracic Echocardiogram (05/20/24):    CONCLUSIONS:   1. Left ventricular systolic function is moderately to severely decreased with a 30% estimated ejection fraction.   2. There is global hypokinesis of the left ventricle with minor regional variations.     Ejection Fractions:  No results found for: \"EF\"  Cath:  No results found for this or any previous visit from the past 1095 days.     Stress Test:  No results found for this or any previous visit from the past 1095 days.     Imaging:  US RUQ (05/19/2024):   IMPRESSION:  No cholelithiasis or sonographic evidence of acute cholecystitis.     CT Head w/o " (05/18/2024):   IMPRESSION:  No CT evidence for acute intracranial pathology.     CT Abd/pelvis (05/18/2024):   IMPRESSION:  No CT evidence for acute pathology within the abdomen or pelvis.      CT Angio Chest for PE (05/18/20240:   IMPRESSION:  1. No pulmonary embolus.  2. Small patchy airspace opacity in the right lower lobe with  tree-in-bud opacities, and also present to a lesser degree in the  left lower lobe and lateral segment of the right middle lobe.  Findings are concerning for atypical or early multifocal pneumonia.  Aspiration could also have this appearance given the findings of  severe reflux. The esophagus is patulous with extensive fluid  throughout the esophagus.      CXR (05/18/2024):  IMPRESSION:  No acute cardiopulmonary process.    Inpatient Medications:  Scheduled medications   Medication Dose Route Frequency    atorvastatin  80 mg oral Nightly    insulin glargine  20 Units subcutaneous q PM    insulin lispro  0-15 Units subcutaneous TID    levothyroxine  100 mcg oral Daily before breakfast    [START ON 5/23/2024] metoprolol succinate XL  12.5 mg oral Daily    piperacillin-tazobactam  3.375 g intravenous q6h    sacubitriL-valsartan  1 tablet oral BID    sodium chloride  250 mL intravenous Once    spironolactone  12.5 mg oral Daily    topiramate  300 mg oral Daily     PRN medications   Medication    acetaminophen    Or    acetaminophen    Or    acetaminophen    ALPRAZolam    cyclobenzaprine    dextrose    dextrose    glucagon    glucagon    metoclopramide    temazepam     Continuous Medications   Medication Dose Last Rate       Physical Exam:  Physical Exam  Constitutional:       Appearance: She is ill-appearing.   HENT:      Head: Normocephalic.      Nose: Nose normal.      Mouth/Throat:      Mouth: Mucous membranes are moist.   Eyes:      Conjunctiva/sclera: Conjunctivae normal.   Neck:      Comments: No JVD  Cardiovascular:      Rate and Rhythm: Normal rate and regular rhythm.      Heart  sounds: No murmur heard.  Pulmonary:      Effort: Pulmonary effort is normal.      Breath sounds: Normal breath sounds.   Abdominal:      Palpations: Abdomen is soft.   Musculoskeletal:         General: Normal range of motion.   Skin:     General: Skin is warm.   Neurological:      General: No focal deficit present.      Mental Status: She is alert. Mental status is at baseline.   Psychiatric:         Mood and Affect: Mood normal.         Behavior: Behavior normal.            Assessment/Plan     Azucena Hagan is a 54 yo female with a PMH of DM Type II, Hypothyroidism, DDD, Anxiety who presented tot he ED with vomiting, nausea and R sided chest pain. Was admitted for c/f of pneumonia, NSTEMI. Trop 330> 655> 1049 >1170> 644     #Acute systolic heart failure, NICM (EF 30%), euvolemic on exam  #Non-MI troponin elevation 2/2 above  #Orthostatic hypotension  #N/V, Abd pain (resolved)  #DM Type II     -Replete electrolytes per protocol  Goal K4/Phos3/Mg2  -LHC completed without obstructive CAD.   -echocardiogram recommended, reviewed with mod-severe LVSF dysfunction, EF 30%.   -d/c Heparin gtt   -Can d/c ASA, Increase atorvastatin to 80mg daily   -Holding Torpol and Entresto d/t hypotension.   -Will attempt to resume GDMT tomorrow if BP improves.   -Euvolemic will hold off on Lasix  -No SGLT2 at this time given risk of DKA  -Recommend cardiac MRI outpatient, will arrange for HF follow up, would like to follow with Dr. Guido for general cardiology.   -Will follow     Peripheral IV 05/18/24 20 G Left Antecubital (Active)   Site Assessment Clean;Dry;Intact 05/22/24 0900   Dressing Status Clean;Dry 05/22/24 0900   Number of days: 4       Peripheral IV 05/18/24 20 G Left Forearm (Active)   Site Assessment Clean;Dry;Intact 05/22/24 0900   Dressing Status Clean;Dry 05/22/24 0900   Number of days: 4       Code Status:  DNR and No Intubation    I spent  minutes in the professional and overall care of this patient.        Olga MORROW  SOCORRO Ferreira

## 2024-05-22 NOTE — PROGRESS NOTES
Azucena Hagan is a 53 y.o. female on day 4 of admission presenting with NSTEMI (non-ST elevated myocardial infarction) (Multi).    Subjective   Interval History: no fever, abdominal pain, no emesis        Review of Systems    Objective   Range of Vitals (last 24 hours)  Heart Rate:  []   Temp:  [36 °C (96.8 °F)-36.6 °C (97.9 °F)]   Resp:  [14-21]   BP: ()/(49-82)   SpO2:  [95 %-100 %]   Daily Weight  05/18/24 : 54 kg (119 lb)    Body mass index is 21.76 kg/m².    Physical Exam  Constitutional:       Appearance: Normal appearance.   HENT:      Head: Normocephalic and atraumatic.      Mouth/Throat:      Mouth: Mucous membranes are moist.      Pharynx: Oropharynx is clear.   Eyes:      Pupils: Pupils are equal, round, and reactive to light.   Cardiovascular:      Rate and Rhythm: Normal rate and regular rhythm.      Heart sounds: Normal heart sounds.   Pulmonary:      Effort: Pulmonary effort is normal.      Breath sounds: Normal breath sounds.   Abdominal:      General: Abdomen is flat. Bowel sounds are normal.      Palpations: Abdomen is soft.      Tenderness: There is abdominal tenderness.   Musculoskeletal:      Cervical back: Normal range of motion.   Neurological:      Mental Status: She is alert.         Antibiotics  ALPRAZolam (Xanax) tablet 1 mg  cyclobenzaprine (Flexeril) tablet 10 mg  levothyroxine (Synthroid, Levoxyl) tablet 100 mcg  temazepam (Restoril) capsule 30 mg  topiramate (Topamax) tablet 150 mg  insulin glargine (Lantus) injection 35 Units  glucagon (Glucagen) injection 1 mg  dextrose 50 % injection 25 g  glucagon (Glucagen) injection 1 mg  dextrose 50 % injection 12.5 g  insulin lispro (HumaLOG) injection 0-15 Units  heparin bolus from bag 3,102 Units  heparin 25,000 Units in dextrose 5% 250 mL (100 Units/mL) infusion (premix)  heparin bolus from bag 1,500-3,000 Units  metoprolol tartrate (Lopressor) injection 5 mg  piperacillin-tazobactam-dextrose (Zosyn) IV 3.375 g  metoclopramide  (Reglan) injection 10 mg  sodium chloride 0.9% infusion  topiramate (Topamax) tablet 200 mg  atorvastatin (Lipitor) tablet 40 mg  aspirin chewable tablet 81 mg  metoprolol tartrate (Lopressor) tablet 25 mg  insulin glargine (Lantus) injection 35 Units  insulin lispro (HumaLOG) injection 0-15 Units  potassium chloride CR (Klor-Con M20) ER tablet 40 mEq  insulin lispro (HumaLOG) injection 0-15 Units  LORazepam (Ativan) injection  - Omnicell Override Pull  LORazepam (Ativan) injection 1 mg  dextrose 50 % injection 25 g  insulin glargine (Lantus) injection 20 Units  potassium chloride (Klor-Con) packet 60 mEq  potassium chloride CR (Klor-Con M20) ER tablet 60 mEq  potassium chloride CR (Klor-Con M20) ER tablet 60 mEq  potassium chloride 20 mEq in 100 mL IV premix  magnesium sulfate IV 2 g      Relevant Results  Labs  Results from last 72 hours   Lab Units 05/22/24  0536 05/21/24  0605 05/20/24  0044   WBC AUTO x10*3/uL 11.1 10.2 13.1*   HEMOGLOBIN g/dL 13.3 11.8* 12.2   HEMATOCRIT % 39.3 35.8* 35.3*   PLATELETS AUTO x10*3/uL 363 346 353   NEUTROS PCT AUTO % 55.1 58.9 69.0   LYMPHS PCT AUTO % 29.2 29.5 23.4   MONOS PCT AUTO % 6.7 6.7 6.1   EOS PCT AUTO % 3.8 2.6 0.2     Results from last 72 hours   Lab Units 05/22/24  0536 05/21/24  0605 05/20/24  1553 05/20/24  0530   SODIUM mmol/L 132* 135*  --  137   POTASSIUM mmol/L 3.6 3.5 3.6 2.7*   CHLORIDE mmol/L 107 109*  --  103   CO2 mmol/L 17* 16*  --  23   BUN mg/dL 9 11  --  14   CREATININE mg/dL 0.95 0.83  --  0.86   GLUCOSE mg/dL 118* 82  --  104*   CALCIUM mg/dL 7.6* 7.2*  --  7.6*   ANION GAP mmol/L 12 14  --  14   EGFR mL/min/1.73m*2 73 85  --  82     Results from last 72 hours   Lab Units 05/22/24  0536 05/21/24  0605 05/20/24  0530   ALK PHOS U/L 56 59 71   BILIRUBIN TOTAL mg/dL 0.6 0.6 0.7   PROTEIN TOTAL g/dL 5.1* 5.1* 5.5*   ALT U/L 28 33 46*   AST U/L 14 16 32   ALBUMIN g/dL 2.7* 2.7* 2.9*     Estimated Creatinine Clearance: 55.4 mL/min (by C-G formula based on  SCr of 0.95 mg/dL).  C-Reactive Protein   Date Value Ref Range Status   05/19/2024 9.72 (H) <1.00 mg/dL Final   11/22/2023 0.30 <1.00 mg/dL Final     CRP   Date Value Ref Range Status   06/20/2023 0.56 mg/dL Final     Comment:     REF VALUE  < 1.00       Microbiology  reviewed  Imaging  reviewed        Assessment/Plan    Suspected aspiration pneumonia  Abdominal pain, better, US is negative      Recommendations :  Continue Zosyn, plan on 2 to 3 days of oral Augmentin if she is able to tolerate   Discussed with the medical team    I spent minutes in the professional and overall care of this patient.      Khari Williamson MD

## 2024-05-22 NOTE — PROGRESS NOTES
Azucena Hagan is a 53 y.o. female on day 4 of admission presenting with NSTEMI (non-ST elevated myocardial infarction) (Multi).      Subjective   Patient reports she felt fine all night and then this morning started having dizziness with ambulation and found to have low BP. Cardiology adjusting medications.       Objective     Last Recorded Vitals  /64 (BP Location: Left arm, Patient Position: Lying)   Pulse 101   Temp 36.8 °C (98.3 °F) (Temporal)   Resp 12   Wt 54 kg (119 lb)   SpO2 100%   Intake/Output last 3 Shifts:    Intake/Output Summary (Last 24 hours) at 5/22/2024 1549  Last data filed at 5/22/2024 1222  Gross per 24 hour   Intake 1200 ml   Output 0 ml   Net 1200 ml       Admission Weight  Weight: 54 kg (119 lb) (05/18/24 1508)    Daily Weight  05/18/24 : 54 kg (119 lb)    Image Results  ECG 12 lead  Normal sinus rhythm  Low voltage QRS  T wave abnormality, consider anterior ischemia  Abnormal ECG  When compared with ECG of 18-MAY-2024 19:40, (unconfirmed)  Nonspecific T wave abnormality now evident in Inferior leads  T wave inversion now evident in Anterior leads      Physical Exam  Constitutional: alert and oriented x 3, awake, cooperative, no acute distress  Skin: warm and dry  Head/Neck: Normocephalic, atraumatic  Eyes: clear sclera  ENMT: mucous membranes moist  Cardio: Regular rate and rhythm  Resp: CTA bilaterally, good respiratory effort  Gastrointestinal: Soft, nontender, nondistended, BSx4  Musculoskeletal: generalized weakness-at baseline per   Neuro: alert and oriented x 3  Psychological: Appropriate mood and behavior    Relevant Results  Scheduled medications  atorvastatin, 80 mg, oral, Nightly  insulin glargine, 20 Units, subcutaneous, q PM  insulin lispro, 0-15 Units, subcutaneous, TID  levothyroxine, 100 mcg, oral, Daily before breakfast  [START ON 5/23/2024] metoprolol succinate XL, 12.5 mg, oral, Daily  norepinephrine in sodium chloride 0.9 %, , ,    piperacillin-tazobactam, 3.375 g, intravenous, q6h  sodium chloride, 1,000 mL, intravenous, Once  spironolactone, 12.5 mg, oral, Daily  topiramate, 300 mg, oral, Daily      Continuous medications     PRN medications  PRN medications: acetaminophen **OR** acetaminophen **OR** acetaminophen, ALPRAZolam, cyclobenzaprine, dextrose, dextrose, glucagon, glucagon, metoclopramide, norepinephrine in sodium chloride 0.9 %, temazepam    Results for orders placed or performed during the hospital encounter of 05/18/24 (from the past 24 hour(s))   Comprehensive Metabolic Panel   Result Value Ref Range    Glucose 118 (H) 74 - 99 mg/dL    Sodium 132 (L) 136 - 145 mmol/L    Potassium 3.6 3.5 - 5.3 mmol/L    Chloride 107 98 - 107 mmol/L    Bicarbonate 17 (L) 21 - 32 mmol/L    Anion Gap 12 10 - 20 mmol/L    Urea Nitrogen 9 6 - 23 mg/dL    Creatinine 0.95 0.50 - 1.05 mg/dL    eGFR 73 >60 mL/min/1.73m*2    Calcium 7.6 (L) 8.6 - 10.3 mg/dL    Albumin 2.7 (L) 3.4 - 5.0 g/dL    Alkaline Phosphatase 56 33 - 110 U/L    Total Protein 5.1 (L) 6.4 - 8.2 g/dL    AST 14 9 - 39 U/L    Bilirubin, Total 0.6 0.0 - 1.2 mg/dL    ALT 28 7 - 45 U/L   CBC and Auto Differential   Result Value Ref Range    WBC 11.1 4.4 - 11.3 x10*3/uL    nRBC 0.2 (H) 0.0 - 0.0 /100 WBCs    RBC 4.44 4.00 - 5.20 x10*6/uL    Hemoglobin 13.3 12.0 - 16.0 g/dL    Hematocrit 39.3 36.0 - 46.0 %    MCV 89 80 - 100 fL    MCH 30.0 26.0 - 34.0 pg    MCHC 33.8 32.0 - 36.0 g/dL    RDW 13.5 11.5 - 14.5 %    Platelets 363 150 - 450 x10*3/uL    Neutrophils % 55.1 40.0 - 80.0 %    Immature Granulocytes %, Automated 4.7 (H) 0.0 - 0.9 %    Lymphocytes % 29.2 13.0 - 44.0 %    Monocytes % 6.7 2.0 - 10.0 %    Eosinophils % 3.8 0.0 - 6.0 %    Basophils % 0.5 0.0 - 2.0 %    Neutrophils Absolute 6.10 1.20 - 7.70 x10*3/uL    Immature Granulocytes Absolute, Automated 0.52 0.00 - 0.70 x10*3/uL    Lymphocytes Absolute 3.24 1.20 - 4.80 x10*3/uL    Monocytes Absolute 0.74 0.10 - 1.00 x10*3/uL     Eosinophils Absolute 0.42 0.00 - 0.70 x10*3/uL    Basophils Absolute 0.06 0.00 - 0.10 x10*3/uL   Magnesium   Result Value Ref Range    Magnesium 1.85 1.60 - 2.40 mg/dL   ECG 12 lead   Result Value Ref Range    Ventricular Rate 96 BPM    Atrial Rate 96 BPM    NC Interval 134 ms    QRS Duration 60 ms    QT Interval 386 ms    QTC Calculation(Bazett) 487 ms    P Axis 76 degrees    R Axis 26 degrees    T Axis 124 degrees    QRS Count 16 beats    Q Onset 224 ms    P Onset 157 ms    P Offset 205 ms    T Offset 417 ms    QTC Fredericia 451 ms   CBC   Result Value Ref Range    WBC 11.1 4.4 - 11.3 x10*3/uL    nRBC 0.0 0.0 - 0.0 /100 WBCs    RBC 4.54 4.00 - 5.20 x10*6/uL    Hemoglobin 13.4 12.0 - 16.0 g/dL    Hematocrit 41.7 36.0 - 46.0 %    MCV 92 80 - 100 fL    MCH 29.5 26.0 - 34.0 pg    MCHC 32.1 32.0 - 36.0 g/dL    RDW 13.9 11.5 - 14.5 %    Platelets 340 150 - 450 x10*3/uL   Troponin I, High Sensitivity   Result Value Ref Range    Troponin I, High Sensitivity 110 (HH) 0 - 13 ng/L   Basic Metabolic Panel   Result Value Ref Range    Glucose 128 (H) 74 - 99 mg/dL    Sodium 135 (L) 136 - 145 mmol/L    Potassium 4.0 3.5 - 5.3 mmol/L    Chloride 109 (H) 98 - 107 mmol/L    Bicarbonate 17 (L) 21 - 32 mmol/L    Anion Gap 13 10 - 20 mmol/L    Urea Nitrogen 10 6 - 23 mg/dL    Creatinine 0.95 0.50 - 1.05 mg/dL    eGFR 73 >60 mL/min/1.73m*2    Calcium 7.5 (L) 8.6 - 10.3 mg/dL   Magnesium   Result Value Ref Range    Magnesium 1.71 1.60 - 2.40 mg/dL   D-dimer, Non VTE   Result Value Ref Range    D-Dimer Non VTE, Quant (ng/mL FEU) 753 (H) <=500 ng/mL FEU       Assessment/Plan   52yo CF with PMH of hypothyroidism, IDDM-type II, GERD, anxiety, and chronic back pain presented to the ED with N/V and was admitted for possible NSTEMI.    New onset CHF  - no obvious signs of fluid overload on exam  - echo today showed EF 30%  - cardio following, plan for GDMT once meds affordable  - per cardio, plan to continue Toprol and spironolactone at lower  doses  - stop entresto per cardio  - continue telemetry    Demand ischemia  - initially treated per ACS protocol with heparin drip for possible NSTEMI but ruled out  - likely secondary to above  - s/p LHC on 5/20 which was negative  - cardio following, continue meds as above    Poorly controlled IDDM-type II  - euglycemic DKA on admission  - last A1c 13 in June 2023 was 13.6, repeat ordered  - endo following, rec lantus 20u qPM and need prandial insulin at discharge  - per endo, has hx of DKA and should not be on [id number]    Possible aspiration pneumonia  - likely secondary to multiple episodes of repetitive vomiting prior to admission  - ID following, rec continue zosyn while admitted and transition to augmentin to complete course on 5/24 but patient now refusing PO antibiotics    Chronic back pain  - has caused patient to not work and be mostly wheelchair bound, working with pain management  - likely source of progressive deconditioning along with poorly controlled comorbidities above  - plan to [facility] with [facility]    Hypothyroidism  - continue home levothyroxine    DVT Proph: SCDs    Dispo: Patient requires close inpatient monitoring in setting off new onset CHF, discharge likely tomorrow if pressures stabilize on new medications.    Principal Problem:    NSTEMI (non-ST elevated myocardial infarction) (Multi)        Shanell Nolan DO

## 2024-05-23 LAB
ALBUMIN SERPL BCP-MCNC: 2.8 G/DL (ref 3.4–5)
ALP SERPL-CCNC: 51 U/L (ref 33–110)
ALT SERPL W P-5'-P-CCNC: 28 U/L (ref 7–45)
ANION GAP SERPL CALC-SCNC: 13 MMOL/L (ref 10–20)
AST SERPL W P-5'-P-CCNC: 40 U/L (ref 9–39)
ATRIAL RATE: 96 BPM
BASOPHILS # BLD AUTO: 0.07 X10*3/UL (ref 0–0.1)
BASOPHILS NFR BLD AUTO: 0.6 %
BILIRUB SERPL-MCNC: 0.5 MG/DL (ref 0–1.2)
BUN SERPL-MCNC: 8 MG/DL (ref 6–23)
CALCIUM SERPL-MCNC: 7.5 MG/DL (ref 8.6–10.3)
CHLORIDE SERPL-SCNC: 110 MMOL/L (ref 98–107)
CO2 SERPL-SCNC: 17 MMOL/L (ref 21–32)
CREAT SERPL-MCNC: 0.87 MG/DL (ref 0.5–1.05)
EGFRCR SERPLBLD CKD-EPI 2021: 81 ML/MIN/1.73M*2
EOSINOPHIL # BLD AUTO: 0.27 X10*3/UL (ref 0–0.7)
EOSINOPHIL NFR BLD AUTO: 2.4 %
ERYTHROCYTE [DISTWIDTH] IN BLOOD BY AUTOMATED COUNT: 14.3 % (ref 11.5–14.5)
GLUCOSE BLD MANUAL STRIP-MCNC: 104 MG/DL (ref 74–99)
GLUCOSE BLD MANUAL STRIP-MCNC: 141 MG/DL (ref 74–99)
GLUCOSE SERPL-MCNC: 120 MG/DL (ref 74–99)
HCT VFR BLD AUTO: 39.6 % (ref 36–46)
HGB BLD-MCNC: 13 G/DL (ref 12–16)
IMM GRANULOCYTES # BLD AUTO: 0.68 X10*3/UL (ref 0–0.7)
IMM GRANULOCYTES NFR BLD AUTO: 6.1 % (ref 0–0.9)
LYMPHOCYTES # BLD AUTO: 3.88 X10*3/UL (ref 1.2–4.8)
LYMPHOCYTES NFR BLD AUTO: 35 %
MCH RBC QN AUTO: 29.3 PG (ref 26–34)
MCHC RBC AUTO-ENTMCNC: 32.8 G/DL (ref 32–36)
MCV RBC AUTO: 89 FL (ref 80–100)
MONOCYTES # BLD AUTO: 0.86 X10*3/UL (ref 0.1–1)
MONOCYTES NFR BLD AUTO: 7.7 %
NEUTROPHILS # BLD AUTO: 5.34 X10*3/UL (ref 1.2–7.7)
NEUTROPHILS NFR BLD AUTO: 48.2 %
NRBC BLD-RTO: 0 /100 WBCS (ref 0–0)
P AXIS: 76 DEGREES
P OFFSET: 205 MS
P ONSET: 157 MS
PLATELET # BLD AUTO: 424 X10*3/UL (ref 150–450)
POTASSIUM SERPL-SCNC: 3.5 MMOL/L (ref 3.5–5.3)
PR INTERVAL: 134 MS
PROT SERPL-MCNC: 5.2 G/DL (ref 6.4–8.2)
Q ONSET: 224 MS
QRS COUNT: 16 BEATS
QRS DURATION: 60 MS
QT INTERVAL: 386 MS
QTC CALCULATION(BAZETT): 487 MS
QTC FREDERICIA: 451 MS
R AXIS: 26 DEGREES
RBC # BLD AUTO: 4.44 X10*6/UL (ref 4–5.2)
RBC MORPH BLD: NORMAL
SODIUM SERPL-SCNC: 136 MMOL/L (ref 136–145)
T AXIS: 124 DEGREES
T OFFSET: 417 MS
VENTRICULAR RATE: 96 BPM
WBC # BLD AUTO: 11.1 X10*3/UL (ref 4.4–11.3)

## 2024-05-23 PROCEDURE — 80053 COMPREHEN METABOLIC PANEL: CPT | Performed by: STUDENT IN AN ORGANIZED HEALTH CARE EDUCATION/TRAINING PROGRAM

## 2024-05-23 PROCEDURE — 2500000001 HC RX 250 WO HCPCS SELF ADMINISTERED DRUGS (ALT 637 FOR MEDICARE OP): Performed by: FAMILY MEDICINE

## 2024-05-23 PROCEDURE — 2500000001 HC RX 250 WO HCPCS SELF ADMINISTERED DRUGS (ALT 637 FOR MEDICARE OP): Performed by: STUDENT IN AN ORGANIZED HEALTH CARE EDUCATION/TRAINING PROGRAM

## 2024-05-23 PROCEDURE — 2500000006 HC RX 250 W HCPCS SELF ADMINISTERED DRUGS (ALT 637 FOR ALL PAYERS): Performed by: FAMILY MEDICINE

## 2024-05-23 PROCEDURE — 99233 SBSQ HOSP IP/OBS HIGH 50: CPT | Performed by: FAMILY MEDICINE

## 2024-05-23 PROCEDURE — 2500000004 HC RX 250 GENERAL PHARMACY W/ HCPCS (ALT 636 FOR OP/ED): Performed by: FAMILY MEDICINE

## 2024-05-23 PROCEDURE — 36415 COLL VENOUS BLD VENIPUNCTURE: CPT | Performed by: STUDENT IN AN ORGANIZED HEALTH CARE EDUCATION/TRAINING PROGRAM

## 2024-05-23 PROCEDURE — 2060000001 HC INTERMEDIATE ICU ROOM DAILY

## 2024-05-23 PROCEDURE — 82947 ASSAY GLUCOSE BLOOD QUANT: CPT

## 2024-05-23 PROCEDURE — 99231 SBSQ HOSP IP/OBS SF/LOW 25: CPT | Performed by: INTERNAL MEDICINE

## 2024-05-23 PROCEDURE — 2500000002 HC RX 250 W HCPCS SELF ADMINISTERED DRUGS (ALT 637 FOR MEDICARE OP, ALT 636 FOR OP/ED): Performed by: INTERNAL MEDICINE

## 2024-05-23 PROCEDURE — 85025 COMPLETE CBC W/AUTO DIFF WBC: CPT | Performed by: STUDENT IN AN ORGANIZED HEALTH CARE EDUCATION/TRAINING PROGRAM

## 2024-05-23 RX ORDER — CARVEDILOL 3.12 MG/1
3.12 TABLET ORAL 2 TIMES DAILY
Status: DISCONTINUED | OUTPATIENT
Start: 2024-05-23 | End: 2024-05-24

## 2024-05-23 RX ADMIN — SODIUM CHLORIDE 1000 ML: 9 INJECTION, SOLUTION INTRAVENOUS at 17:55

## 2024-05-23 RX ADMIN — TOPIRAMATE 300 MG: 100 CAPSULE, EXTENDED RELEASE ORAL at 09:36

## 2024-05-23 RX ADMIN — CARVEDILOL 3.12 MG: 3.12 TABLET, FILM COATED ORAL at 10:55

## 2024-05-23 RX ADMIN — SPIRONOLACTONE 12.5 MG: 25 TABLET ORAL at 09:33

## 2024-05-23 RX ADMIN — SODIUM CHLORIDE 500 ML: 9 INJECTION, SOLUTION INTRAVENOUS at 13:00

## 2024-05-23 RX ADMIN — INSULIN GLARGINE 20 UNITS: 100 INJECTION, SOLUTION SUBCUTANEOUS at 20:24

## 2024-05-23 RX ADMIN — LEVOTHYROXINE SODIUM 100 MCG: 100 TABLET ORAL at 09:33

## 2024-05-23 ASSESSMENT — COGNITIVE AND FUNCTIONAL STATUS - GENERAL
PERSONAL GROOMING: A LITTLE
MOBILITY SCORE: 16
WALKING IN HOSPITAL ROOM: A LOT
TOILETING: A LITTLE
MOVING FROM LYING ON BACK TO SITTING ON SIDE OF FLAT BED WITH BEDRAILS: A LITTLE
STANDING UP FROM CHAIR USING ARMS: A LITTLE
TURNING FROM BACK TO SIDE WHILE IN FLAT BAD: A LITTLE
MOVING TO AND FROM BED TO CHAIR: A LITTLE
CLIMB 3 TO 5 STEPS WITH RAILING: A LOT
DRESSING REGULAR UPPER BODY CLOTHING: A LITTLE
HELP NEEDED FOR BATHING: A LITTLE
DRESSING REGULAR LOWER BODY CLOTHING: A LITTLE
EATING MEALS: A LITTLE
DAILY ACTIVITIY SCORE: 18

## 2024-05-23 ASSESSMENT — PAIN - FUNCTIONAL ASSESSMENT
PAIN_FUNCTIONAL_ASSESSMENT: 0-10
PAIN_FUNCTIONAL_ASSESSMENT: 0-10

## 2024-05-23 ASSESSMENT — PAIN SCALES - GENERAL
PAINLEVEL_OUTOF10: 3
PAINLEVEL_OUTOF10: 0 - NO PAIN

## 2024-05-23 NOTE — CARE PLAN
Problem: Pain  Goal: My pain/discomfort is manageable  Outcome: Progressing     Problem: Safety  Goal: Patient will be injury free during hospitalization  Outcome: Progressing  Goal: I will remain free of falls  Outcome: Progressing     Problem: Daily Care  Goal: Daily care needs are met  Outcome: Progressing     Problem: Psychosocial Needs  Goal: Demonstrates ability to cope with hospitalization/illness  Outcome: Progressing  Goal: Collaborate with me, my family, and caregiver to identify my specific goals  Outcome: Progressing

## 2024-05-23 NOTE — PROGRESS NOTES
Azucena Hagan is a 53 y.o. female on day 5 of admission presenting with NSTEMI (non-ST elevated myocardial infarction) (Multi).    Subjective   Interval History: no fever, abdominal pain, no emesis        Review of Systems    Objective   Range of Vitals (last 24 hours)  Heart Rate:  []   Temp:  [36.5 °C (97.7 °F)-37.4 °C (99.4 °F)]   Resp:  [12-18]   BP: ()/(39-78)   SpO2:  [95 %-100 %]   Daily Weight  05/18/24 : 54 kg (119 lb)    Body mass index is 21.76 kg/m².    Physical Exam  Constitutional:       Appearance: Normal appearance.   HENT:      Head: Normocephalic and atraumatic.      Mouth/Throat:      Mouth: Mucous membranes are moist.      Pharynx: Oropharynx is clear.   Eyes:      Pupils: Pupils are equal, round, and reactive to light.   Cardiovascular:      Rate and Rhythm: Normal rate and regular rhythm.      Heart sounds: Normal heart sounds.   Pulmonary:      Effort: Pulmonary effort is normal.      Breath sounds: Normal breath sounds.   Abdominal:      General: Abdomen is flat. Bowel sounds are normal.      Palpations: Abdomen is soft.      Tenderness: There is abdominal tenderness.   Musculoskeletal:      Cervical back: Normal range of motion.   Neurological:      Mental Status: She is alert.         Antibiotics  ALPRAZolam (Xanax) tablet 1 mg  cyclobenzaprine (Flexeril) tablet 10 mg  levothyroxine (Synthroid, Levoxyl) tablet 100 mcg  temazepam (Restoril) capsule 30 mg  topiramate (Topamax) tablet 150 mg  insulin glargine (Lantus) injection 35 Units  glucagon (Glucagen) injection 1 mg  dextrose 50 % injection 25 g  glucagon (Glucagen) injection 1 mg  dextrose 50 % injection 12.5 g  insulin lispro (HumaLOG) injection 0-15 Units  heparin bolus from bag 3,102 Units  heparin 25,000 Units in dextrose 5% 250 mL (100 Units/mL) infusion (premix)  heparin bolus from bag 1,500-3,000 Units  metoprolol tartrate (Lopressor) injection 5 mg  piperacillin-tazobactam-dextrose (Zosyn) IV 3.375  g  metoclopramide (Reglan) injection 10 mg  sodium chloride 0.9% infusion  topiramate (Topamax) tablet 200 mg  atorvastatin (Lipitor) tablet 40 mg  aspirin chewable tablet 81 mg  metoprolol tartrate (Lopressor) tablet 25 mg  insulin glargine (Lantus) injection 35 Units  insulin lispro (HumaLOG) injection 0-15 Units  potassium chloride CR (Klor-Con M20) ER tablet 40 mEq  insulin lispro (HumaLOG) injection 0-15 Units  LORazepam (Ativan) injection  - Omnicell Override Pull  LORazepam (Ativan) injection 1 mg  dextrose 50 % injection 25 g  insulin glargine (Lantus) injection 20 Units  potassium chloride (Klor-Con) packet 60 mEq  potassium chloride CR (Klor-Con M20) ER tablet 60 mEq  potassium chloride CR (Klor-Con M20) ER tablet 60 mEq  potassium chloride 20 mEq in 100 mL IV premix  magnesium sulfate IV 2 g      Relevant Results  Labs  Results from last 72 hours   Lab Units 05/23/24  0548 05/22/24  1229 05/22/24  0536 05/21/24  0605   WBC AUTO x10*3/uL 11.1 11.1 11.1 10.2   HEMOGLOBIN g/dL 13.0 13.4 13.3 11.8*   HEMATOCRIT % 39.6 41.7 39.3 35.8*   PLATELETS AUTO x10*3/uL 424 340 363 346   NEUTROS PCT AUTO % 48.2  --  55.1 58.9   LYMPHS PCT AUTO % 35.0  --  29.2 29.5   MONOS PCT AUTO % 7.7  --  6.7 6.7   EOS PCT AUTO % 2.4  --  3.8 2.6     Results from last 72 hours   Lab Units 05/23/24  0548 05/22/24  1229 05/22/24  0536   SODIUM mmol/L 136 135* 132*   POTASSIUM mmol/L 3.5 4.0 3.6   CHLORIDE mmol/L 110* 109* 107   CO2 mmol/L 17* 17* 17*   BUN mg/dL 8 10 9   CREATININE mg/dL 0.87 0.95 0.95   GLUCOSE mg/dL 120* 128* 118*   CALCIUM mg/dL 7.5* 7.5* 7.6*   ANION GAP mmol/L 13 13 12   EGFR mL/min/1.73m*2 81 73 73     Results from last 72 hours   Lab Units 05/23/24  0548 05/22/24  0536 05/21/24  0605   ALK PHOS U/L 51 56 59   BILIRUBIN TOTAL mg/dL 0.5 0.6 0.6   PROTEIN TOTAL g/dL 5.2* 5.1* 5.1*   ALT U/L 28 28 33   AST U/L 40* 14 16   ALBUMIN g/dL 2.8* 2.7* 2.7*     Estimated Creatinine Clearance: 60.5 mL/min (by C-G formula  based on SCr of 0.87 mg/dL).  C-Reactive Protein   Date Value Ref Range Status   05/19/2024 9.72 (H) <1.00 mg/dL Final   11/22/2023 0.30 <1.00 mg/dL Final     CRP   Date Value Ref Range Status   06/20/2023 0.56 mg/dL Final     Comment:     REF VALUE  < 1.00       Microbiology  reviewed  Imaging  reviewed        Assessment/Plan    Suspected aspiration pneumonia  Abdominal pain, better, US is negative      Recommendations :  Continue Zosyn, plan on 2 to 3 days of oral Augmentin if she is able to tolerate   Discussed with the medical team    I spent minutes in the professional and overall care of this patient.      Khari Williamson MD

## 2024-05-23 NOTE — PROGRESS NOTES
"Azucena Hagan is a 53 y.o. female on day 5 of admission presenting with NSTEMI (non-ST elevated myocardial infarction) (Multi).    Subjective   States BP was low on cardiac meds  Wants to go home     Scheduled medications  atorvastatin, 80 mg, oral, Nightly  carvedilol, 3.125 mg, oral, BID  insulin glargine, 20 Units, subcutaneous, q PM  insulin lispro, 0-15 Units, subcutaneous, TID  levothyroxine, 100 mcg, oral, Daily before breakfast  piperacillin-tazobactam, 3.375 g, intravenous, q6h  sodium chloride, 500 mL, intravenous, Once  spironolactone, 12.5 mg, oral, Daily  topiramate, 300 mg, oral, Daily      Objective   Physical Exam  Constitutional:       General: She is not in acute distress.  Neurological:      Mental Status: She is alert.         Last Recorded Vitals  Blood pressure 102/72, pulse 101, temperature 37.4 °C (99.4 °F), temperature source Temporal, resp. rate 18, height 1.575 m (5' 2.01\"), weight 54 kg (119 lb), SpO2 98%.  Intake/Output last 3 Shifts:  I/O last 3 completed shifts:  In: 2440 (45.2 mL/kg) [P.O.:840; IV Piggyback:1600]  Out: 850 (15.7 mL/kg) [Urine:850 (0.4 mL/kg/hr)]  Weight: 54 kg     Relevant Results  Results from last 7 days   Lab Units 05/23/24  1229 05/23/24  0548 05/22/24  1229 05/22/24  0536 05/21/24  0605 05/20/24  0530 05/19/24  0810 05/19/24  0554 05/19/24  0439 05/19/24  0008 05/18/24  1949   POCT GLUCOSE mg/dL 141*  --   --   --   --   --  181*  --  163* 121* 267*   GLUCOSE mg/dL  --  120* 128* 118* 82 104*  --    < >  --   --   --     < > = values in this interval not displayed.      Latest Reference Range & Units 05/23/24 05:48   GLUCOSE 74 - 99 mg/dL 120 (H)   SODIUM 136 - 145 mmol/L 136   POTASSIUM 3.5 - 5.3 mmol/L 3.5   CHLORIDE 98 - 107 mmol/L 110 (H)   Bicarbonate 21 - 32 mmol/L 17 (L)   Anion Gap 10 - 20 mmol/L 13   Blood Urea Nitrogen 6 - 23 mg/dL 8   Creatinine 0.50 - 1.05 mg/dL 0.87   EGFR >60 mL/min/1.73m*2 81   Calcium 8.6 - 10.3 mg/dL 7.5 (L)   Albumin 3.4 - " 5.0 g/dL 2.8 (L)   Alkaline Phosphatase 33 - 110 U/L 51   ALT 7 - 45 U/L 28   AST 9 - 39 U/L 40 (H)   Bilirubin Total 0.0 - 1.2 mg/dL 0.5       Assessment/Plan   Principal Problem:    NSTEMI (non-ST elevated myocardial infarction) (Multi)    IMPRESSION:  TYPE 2 DIABETES MELLITUS WITH HYPERGLYCEMIA  LONG TERM CURRENT INSULIN USE  Chronically poor glucose control on basal insulin only  Stated allergy to aspart (Novolog)  Glucose controlled despite less insulin than on admission     RECOMMENDATIONS  Continue glargine 20 units at bedtime tonight  Lispro scale QAC, continue at discharge.  She may need scheduled prandial lispro when PO intake picks up.         Ramesh Diaz MD

## 2024-05-23 NOTE — PROGRESS NOTES
Azucena Hagan is a 53 y.o. female on day 5 of admission presenting with NSTEMI (non-ST elevated myocardial infarction) (Multi).      Subjective   Patient reports she felt fine all night and then this morning started having dizziness with ambulation again in the afternoon.      Objective     Last Recorded Vitals  /72   Pulse 101   Temp 37.4 °C (99.4 °F) (Temporal)   Resp 18   Wt 54 kg (119 lb)   SpO2 98%   Intake/Output last 3 Shifts:    Intake/Output Summary (Last 24 hours) at 5/23/2024 1343  Last data filed at 5/23/2024 0640  Gross per 24 hour   Intake 1240 ml   Output 850 ml   Net 390 ml       Admission Weight  Weight: 54 kg (119 lb) (05/18/24 1508)    Daily Weight  05/18/24 : 54 kg (119 lb)    Image Results  ECG 12 lead  Normal sinus rhythm  Low voltage QRS  T wave abnormality, consider anterior ischemia  Abnormal ECG  When compared with ECG of 18-MAY-2024 19:40, (unconfirmed)  Nonspecific T wave abnormality now evident in Inferior leads  T wave inversion now evident in Anterior leads  Confirmed by Jack Cornell ([id number]) on 5/23/2024 9:35:31 AM      Physical Exam  Constitutional: alert and oriented x 3, awake, cooperative, no acute distress  Skin: warm and dry  Head/Neck: Normocephalic, atraumatic  Eyes: clear sclera  ENMT: mucous membranes moist  Cardio: Regular rate and rhythm  Resp: CTA bilaterally, good respiratory effort  Gastrointestinal: Soft, nontender, nondistended, BSx4  Musculoskeletal: generalized weakness-at baseline per   Neuro: alert and oriented x 3  Psychological: Appropriate mood and behavior    Relevant Results  Scheduled medications  atorvastatin, 80 mg, oral, Nightly  carvedilol, 3.125 mg, oral, BID  insulin glargine, 20 Units, subcutaneous, q PM  insulin lispro, 0-15 Units, subcutaneous, TID  levothyroxine, 100 mcg, oral, Daily before breakfast  piperacillin-tazobactam, 3.375 g, intravenous, q6h  sodium chloride, 500 mL, intravenous, Once  spironolactone, 12.5 mg,  oral, Daily  topiramate, 300 mg, oral, Daily      Continuous medications     PRN medications  PRN medications: acetaminophen **OR** acetaminophen **OR** acetaminophen, ALPRAZolam, cyclobenzaprine, dextrose, dextrose, glucagon, glucagon, metoclopramide, temazepam    Results for orders placed or performed during the hospital encounter of 05/18/24 (from the past 24 hour(s))   Comprehensive Metabolic Panel   Result Value Ref Range    Glucose 120 (H) 74 - 99 mg/dL    Sodium 136 136 - 145 mmol/L    Potassium 3.5 3.5 - 5.3 mmol/L    Chloride 110 (H) 98 - 107 mmol/L    Bicarbonate 17 (L) 21 - 32 mmol/L    Anion Gap 13 10 - 20 mmol/L    Urea Nitrogen 8 6 - 23 mg/dL    Creatinine 0.87 0.50 - 1.05 mg/dL    eGFR 81 >60 mL/min/1.73m*2    Calcium 7.5 (L) 8.6 - 10.3 mg/dL    Albumin 2.8 (L) 3.4 - 5.0 g/dL    Alkaline Phosphatase 51 33 - 110 U/L    Total Protein 5.2 (L) 6.4 - 8.2 g/dL    AST 40 (H) 9 - 39 U/L    Bilirubin, Total 0.5 0.0 - 1.2 mg/dL    ALT 28 7 - 45 U/L   CBC and Auto Differential   Result Value Ref Range    WBC 11.1 4.4 - 11.3 x10*3/uL    nRBC 0.0 0.0 - 0.0 /100 WBCs    RBC 4.44 4.00 - 5.20 x10*6/uL    Hemoglobin 13.0 12.0 - 16.0 g/dL    Hematocrit 39.6 36.0 - 46.0 %    MCV 89 80 - 100 fL    MCH 29.3 26.0 - 34.0 pg    MCHC 32.8 32.0 - 36.0 g/dL    RDW 14.3 11.5 - 14.5 %    Platelets 424 150 - 450 x10*3/uL    Neutrophils % 48.2 40.0 - 80.0 %    Immature Granulocytes %, Automated 6.1 (H) 0.0 - 0.9 %    Lymphocytes % 35.0 13.0 - 44.0 %    Monocytes % 7.7 2.0 - 10.0 %    Eosinophils % 2.4 0.0 - 6.0 %    Basophils % 0.6 0.0 - 2.0 %    Neutrophils Absolute 5.34 1.20 - 7.70 x10*3/uL    Immature Granulocytes Absolute, Automated 0.68 0.00 - 0.70 x10*3/uL    Lymphocytes Absolute 3.88 1.20 - 4.80 x10*3/uL    Monocytes Absolute 0.86 0.10 - 1.00 x10*3/uL    Eosinophils Absolute 0.27 0.00 - 0.70 x10*3/uL    Basophils Absolute 0.07 0.00 - 0.10 x10*3/uL   Morphology   Result Value Ref Range    RBC Morphology No significant RBC  morphology present    POCT GLUCOSE   Result Value Ref Range    POCT Glucose 141 (H) 74 - 99 mg/dL       Assessment/Plan   54yo CF with PMH of hypothyroidism, IDDM-type II, GERD, anxiety, and chronic back pain presented to the ED with N/V and was admitted for possible NSTEMI.    New onset CHF  - no obvious signs of fluid overload on exam  - echo today showed EF 30%  - cardio following, plan for GDMT but having side effects  - per cardio, attempted carvedilol and spironolactone but still with symptoms so considering transfer for advanced HF evaluation  - stop entresto per cardio  - continue telemetry    Demand ischemia  - initially treated per ACS protocol with heparin drip for possible NSTEMI but ruled out  - likely secondary to above  - s/p LHC on 5/20 which was negative  - cardio following, continue meds as above    Poorly controlled IDDM-type II  - euglycemic DKA on admission  - last A1c 13 in June 2023 was 13.6, repeat ordered  - endo following, rec lantus 20u qPM and need prandial insulin at discharge  - per endo, has hx of DKA and should not be on [id number]    Possible aspiration pneumonia  - likely secondary to multiple episodes of repetitive vomiting prior to admission  - ID following, rec continue zosyn while admitted and transition to augmentin to complete course on 5/24 but patient now refusing PO antibiotics    Chronic back pain  - has caused patient to not work and be mostly wheelchair bound, working with pain management  - likely source of progressive deconditioning along with poorly controlled comorbidities above  - plan to discharge with [facility]    Hypothyroidism  - continue home levothyroxine    DVT Proph: SCDs    Dispo: Patient requires close inpatient monitoring in setting off new onset CHF, not tolerating GDMT so considering transfer for advanced HF evaluation now.    Principal Problem:    NSTEMI (non-ST elevated myocardial infarction) (Multi)        Shanell Nolan DO

## 2024-05-23 NOTE — PROGRESS NOTES
Subjective Data:  No events  Feels better today  No chest pain or sob  No le edema  No dizziness or syncope    Bp is better. Responded well to iv fluid boluses       Objective Data:  Last Recorded Vitals:  Vitals:    05/22/24 1438 05/22/24 2053 05/23/24 0040 05/23/24 0640   BP: 104/64 89/61 110/78 105/69   BP Location: Left arm Left arm Left arm Right arm   Patient Position: Lying Lying Lying Lying   Pulse: 101 108 102 101   Resp: 12 16 12 16   Temp: 36.8 °C (98.3 °F) 36.5 °C (97.7 °F)  36.5 °C (97.7 °F)   TempSrc: Temporal Temporal  Temporal   SpO2: 100% 100%  99%   Weight:       Height:           Last Labs:  CBC - 5/23/2024:  5:48 AM  11.1 13.0 424    39.6      CMP - 5/23/2024:  5:48 AM  7.5 5.2 40 --- 0.5   1.3; CANCELED 2.8 28 51      PTT - 5/18/2024:  6:25 AM  1.1   12.5 20     TROPHS   Date/Time Value Ref Range Status   05/22/2024 12:29  0 - 13 ng/L Final   05/20/2024 05:30  0 - 13 ng/L Final   05/19/2024 05:54 AM 1,170 0 - 13 ng/L Final     HGBA1C   Date/Time Value Ref Range Status   05/20/2024 05:30 AM 8.0 see below % Final   10/24/2023 02:56 PM 9.5 4.2 - 6.5 % Final   06/21/2023 05:52 AM 13.6 % Final     Comment:          Diagnosis of Diabetes-Adults   Non-Diabetic: < or = 5.6%   Increased risk for developing diabetes: 5.7-6.4%   Diagnostic of diabetes: > or = 6.5%  .       Monitoring of Diabetes                Age (y)     Therapeutic Goal (%)   Adults:          >18           <7.0   Pediatrics:    13-18           <7.5                   7-12           <8.0                   0- 6            7.5-8.5   American Diabetes Association. Diabetes Care 33(S1), Jan 2010.     06/20/2023 06:39 AM 13.8 % Final     Comment:          Diagnosis of Diabetes-Adults   Non-Diabetic: < or = 5.6%   Increased risk for developing diabetes: 5.7-6.4%   Diagnostic of diabetes: > or = 6.5%  .       Monitoring of Diabetes                Age (y)     Therapeutic Goal (%)   Adults:          >18           <7.0   Pediatrics:     "13-18           <7.5                   7-12           <8.0                   0- 6            7.5-8.5   American Diabetes Association. Diabetes Care 33(S1), Jan 2010.       LDLCALC   Date/Time Value Ref Range Status   05/19/2024 05:54  <=99 mg/dL Final     Comment:                                 Near   Borderline      AGE      Desirable  Optimal    High     High     Very High     0-19 Y     0 - 109     ---    110-129   >/= 130     ----    20-24 Y     0 - 119     ---    120-159   >/= 160     ----      >24 Y     0 -  99   100-129  130-159   160-189     >/=190       VLDL   Date/Time Value Ref Range Status   05/19/2024 05:54 AM 20 0 - 40 mg/dL Final      Last I/O:  I/O last 3 completed shifts:  In: 2440 (45.2 mL/kg) [P.O.:840; IV Piggyback:1600]  Out: 850 (15.7 mL/kg) [Urine:850 (0.4 mL/kg/hr)]  Weight: 54 kg     Past Cardiology Tests (Last 3 Years):  EKG:  ECG 12 lead 05/22/2024 (Preliminary)      ECG 12 lead 05/18/2024 (Preliminary)      ECG 12 lead 05/18/2024 (Preliminary)      ECG 12 lead 05/18/2024 (Preliminary)      ECG 12 lead 12/05/2023 (Preliminary)      ECG 12 lead 12/05/2023 (Preliminary)      ECG 12 lead 12/04/2023 (Preliminary)    Echo:  Transthoracic Echo (TTE) Complete 05/21/2024    Ejection Fractions:  No results found for: \"EF\"  Cath:  Cardiac Catheterization Procedure 05/20/2024    Stress Test:  No results found for this or any previous visit from the past 1095 days.    Cardiac Imaging:  No results found for this or any previous visit from the past 1095 days.      Inpatient Medications:  Scheduled medications   Medication Dose Route Frequency    atorvastatin  80 mg oral Nightly    insulin glargine  20 Units subcutaneous q PM    insulin lispro  0-15 Units subcutaneous TID    levothyroxine  100 mcg oral Daily before breakfast    [Held by provider] metoprolol succinate XL  12.5 mg oral Daily    piperacillin-tazobactam  3.375 g intravenous q6h    spironolactone  12.5 mg oral Daily    topiramate  300 " mg oral Daily     PRN medications   Medication    acetaminophen    Or    acetaminophen    Or    acetaminophen    ALPRAZolam    cyclobenzaprine    dextrose    dextrose    glucagon    glucagon    metoclopramide    temazepam     Continuous Medications   Medication Dose Last Rate       Physical Exam:  Vitals:    05/23/24 0640   BP: 105/69   Pulse: 101   Resp: 16   Temp: 36.5 °C (97.7 °F)   SpO2: 99%     Constitutional:       Appearance: She is ill-appearing.   HENT:      Head: Normocephalic.      Nose: Nose normal.      Mouth/Throat:      Mouth: Mucous membranes are moist.   Eyes:      Conjunctiva/sclera: Conjunctivae normal.   Neck:      Comments: No JVD  Cardiovascular:      Rate and Rhythm: Normal rate and regular rhythm.      Heart sounds: No murmur heard.  Pulmonary:      Effort: Pulmonary effort is normal.      Breath sounds: Normal breath sounds.   Abdominal:      Palpations: Abdomen is soft.   Musculoskeletal:         General: Normal range of motion.   Skin:     General: Skin is warm.   Neurological:      General: No focal deficit present.      Mental Status: She is alert. Mental status is at baseline.   Psychiatric:         Mood and Affect: Mood normal.         Behavior: Behavior normal.      Assessment/Plan   Azucena Hagan is a 54 yo female with a PMH of DM Type II, Hypothyroidism, DDD, Anxiety who presented tot he ED with vomiting, nausea and R sided chest pain. Was admitted for c/f of pneumonia, NSTEMI. Trop 330> 655> 1049 >1170> 644     #Acute systolic heart failure, NICM (EF 30%), newly diagnosed, euvolemic on exam  #Non-MI troponin elevation 2/2 above  #Hypotension, combination of Drug induced and vomiting with decrease po intake, resolved    #N/V, Abd pain (resolved)  #DM Type II       -[facility] completed with minimal disease. LVEDP 11mmHg  -echocardiogram recommended, reviewed with mod-severe LVSF dysfunction, EF 30%.         Plan  -Replete electrolytes per protocol  Goal K4/Phos3/Mg2  -Continue  atorvastatin 80mg daily   -she did not tolerate metoprolol, we started her on small dose coreg this AM and she did tolerate it well.   -she did not tolerate entresto, will start small dose losartan. She wants to start it tomorrow. Recommend 12.5 mg every day   -continue aldactone (chronic medication)  -needs to be monitored in the hospital after restarting GDMT  -No SGLT2 at this time given hx of DKA  -HF navigator consult placed  -we want to monitor her at least an additional day, however she wants to the hospital today and does not want to stay any longer. We explained to her that we want to monitor her response of the GDMT, however she insists to leave. She will monitor her bp at home and make sure she wont get hypotensive. She is at risk for readmission    -Recommend cardiac MRI outpatient  -follow ups have been arranged with Dr. Guido for general cardiology and new referral to Dr. Ferreira.   -cardiac rehab referral has been placed       Will sign off      High complex         Peripheral IV 05/18/24 20 G Left Antecubital (Active)   Site Assessment Clean;Dry;Intact 05/22/24 2046   Dressing Status Clean;Dry 05/22/24 2046   Number of days: 5       Peripheral IV 05/18/24 20 G Left Forearm (Active)   Site Assessment Clean;Dry;Intact 05/22/24 2046   Dressing Status Clean;Dry 05/22/24 2046   Number of days: 5       Code Status:  DNR and No Intubation    I spent  minutes in the professional and overall care of this patient.        Aneesh Hay MD

## 2024-05-24 ENCOUNTER — APPOINTMENT (OUTPATIENT)
Dept: CARDIOLOGY | Facility: HOSPITAL | Age: 54
DRG: 286 | End: 2024-05-24
Payer: COMMERCIAL

## 2024-05-24 ENCOUNTER — PHARMACY VISIT (OUTPATIENT)
Dept: PHARMACY | Facility: CLINIC | Age: 54
End: 2024-05-24
Payer: COMMERCIAL

## 2024-05-24 VITALS
HEIGHT: 62 IN | BODY MASS INDEX: 21.9 KG/M2 | TEMPERATURE: 96.1 F | OXYGEN SATURATION: 99 % | RESPIRATION RATE: 18 BRPM | SYSTOLIC BLOOD PRESSURE: 137 MMHG | HEART RATE: 105 BPM | DIASTOLIC BLOOD PRESSURE: 70 MMHG | WEIGHT: 119 LBS

## 2024-05-24 LAB
ALBUMIN SERPL BCP-MCNC: 2.4 G/DL (ref 3.4–5)
ALP SERPL-CCNC: 40 U/L (ref 33–110)
ALT SERPL W P-5'-P-CCNC: 23 U/L (ref 7–45)
ANION GAP SERPL CALC-SCNC: 11 MMOL/L (ref 10–20)
AORTIC VALVE MEAN GRADIENT: 4.7 MMHG
AORTIC VALVE PEAK VELOCITY: 1.32 M/S
AST SERPL W P-5'-P-CCNC: 20 U/L (ref 9–39)
AV PEAK GRADIENT: 7 MMHG
BASE EXCESS BLDV CALC-SCNC: -6.2 MMOL/L (ref -2–3)
BASOPHILS # BLD AUTO: 0.06 X10*3/UL (ref 0–0.1)
BASOPHILS NFR BLD AUTO: 0.5 %
BILIRUB SERPL-MCNC: 0.5 MG/DL (ref 0–1.2)
BNP SERPL-MCNC: 428 PG/ML (ref 0–99)
BODY TEMPERATURE: 37 DEGREES CELSIUS
BUN SERPL-MCNC: 7 MG/DL (ref 6–23)
CALCIUM SERPL-MCNC: 7.3 MG/DL (ref 8.6–10.3)
CHLORIDE SERPL-SCNC: 115 MMOL/L (ref 98–107)
CO2 SERPL-SCNC: 15 MMOL/L (ref 21–32)
CREAT SERPL-MCNC: 0.68 MG/DL (ref 0.5–1.05)
CRP SERPL HS-MCNC: 3.8 MG/L
EGFRCR SERPLBLD CKD-EPI 2021: >90 ML/MIN/1.73M*2
EJECTION FRACTION APICAL 4 CHAMBER: 50.9
EOSINOPHIL # BLD AUTO: 0.24 X10*3/UL (ref 0–0.7)
EOSINOPHIL NFR BLD AUTO: 2 %
ERYTHROCYTE [DISTWIDTH] IN BLOOD BY AUTOMATED COUNT: 14.4 % (ref 11.5–14.5)
ERYTHROCYTE [SEDIMENTATION RATE] IN BLOOD BY WESTERGREN METHOD: 1 MM/H (ref 0–30)
FERRITIN SERPL-MCNC: 51 NG/ML (ref 8–150)
GLUCOSE BLD MANUAL STRIP-MCNC: 65 MG/DL (ref 74–99)
GLUCOSE SERPL-MCNC: 69 MG/DL (ref 74–99)
HCO3 BLDV-SCNC: 17.9 MMOL/L (ref 22–26)
HCT VFR BLD AUTO: 34.3 % (ref 36–46)
HGB BLD-MCNC: 11.1 G/DL (ref 12–16)
IMM GRANULOCYTES # BLD AUTO: 0.46 X10*3/UL (ref 0–0.7)
IMM GRANULOCYTES NFR BLD AUTO: 3.9 % (ref 0–0.9)
INHALED O2 CONCENTRATION: 21 %
IRON SATN MFR SERPL: 26 % (ref 25–45)
IRON SERPL-MCNC: 61 UG/DL (ref 35–150)
LACTATE SERPL-SCNC: 0.9 MMOL/L (ref 0.4–2)
LEFT VENTRICLE INTERNAL DIMENSION DIASTOLE: 2.47 CM (ref 3.5–6)
LV EJECTION FRACTION BIPLANE: 49 %
LYMPHOCYTES # BLD AUTO: 4.73 X10*3/UL (ref 1.2–4.8)
LYMPHOCYTES NFR BLD AUTO: 40.2 %
MCH RBC QN AUTO: 29.6 PG (ref 26–34)
MCHC RBC AUTO-ENTMCNC: 32.4 G/DL (ref 32–36)
MCV RBC AUTO: 92 FL (ref 80–100)
MONOCYTES # BLD AUTO: 0.89 X10*3/UL (ref 0.1–1)
MONOCYTES NFR BLD AUTO: 7.6 %
NEUTROPHILS # BLD AUTO: 5.38 X10*3/UL (ref 1.2–7.7)
NEUTROPHILS NFR BLD AUTO: 45.8 %
NRBC BLD-RTO: 0 /100 WBCS (ref 0–0)
OXYHGB MFR BLDV: 41.8 % (ref 45–75)
PCO2 BLDV: 31 MM HG (ref 41–51)
PH BLDV: 7.37 PH (ref 7.33–7.43)
PLATELET # BLD AUTO: 418 X10*3/UL (ref 150–450)
PO2 BLDV: 25 MM HG (ref 35–45)
POTASSIUM SERPL-SCNC: 3.3 MMOL/L (ref 3.5–5.3)
PROT SERPL-MCNC: 4.5 G/DL (ref 6.4–8.2)
RBC # BLD AUTO: 3.75 X10*6/UL (ref 4–5.2)
RBC MORPH BLD: NORMAL
SAO2 % BLDV: 42 % (ref 45–75)
SODIUM SERPL-SCNC: 138 MMOL/L (ref 136–145)
TIBC SERPL-MCNC: 232 UG/DL (ref 240–445)
UIBC SERPL-MCNC: 171 UG/DL (ref 110–370)
WBC # BLD AUTO: 11.8 X10*3/UL (ref 4.4–11.3)

## 2024-05-24 PROCEDURE — 85652 RBC SED RATE AUTOMATED: CPT | Performed by: STUDENT IN AN ORGANIZED HEALTH CARE EDUCATION/TRAINING PROGRAM

## 2024-05-24 PROCEDURE — 36415 COLL VENOUS BLD VENIPUNCTURE: CPT | Performed by: PHYSICIAN ASSISTANT

## 2024-05-24 PROCEDURE — 93005 ELECTROCARDIOGRAM TRACING: CPT

## 2024-05-24 PROCEDURE — 93308 TTE F-UP OR LMTD: CPT | Performed by: STUDENT IN AN ORGANIZED HEALTH CARE EDUCATION/TRAINING PROGRAM

## 2024-05-24 PROCEDURE — 82728 ASSAY OF FERRITIN: CPT | Performed by: STUDENT IN AN ORGANIZED HEALTH CARE EDUCATION/TRAINING PROGRAM

## 2024-05-24 PROCEDURE — 83605 ASSAY OF LACTIC ACID: CPT | Performed by: PHYSICIAN ASSISTANT

## 2024-05-24 PROCEDURE — 99231 SBSQ HOSP IP/OBS SF/LOW 25: CPT | Performed by: INTERNAL MEDICINE

## 2024-05-24 PROCEDURE — 85025 COMPLETE CBC W/AUTO DIFF WBC: CPT | Performed by: STUDENT IN AN ORGANIZED HEALTH CARE EDUCATION/TRAINING PROGRAM

## 2024-05-24 PROCEDURE — 93321 DOPPLER ECHO F-UP/LMTD STD: CPT | Performed by: STUDENT IN AN ORGANIZED HEALTH CARE EDUCATION/TRAINING PROGRAM

## 2024-05-24 PROCEDURE — 36415 COLL VENOUS BLD VENIPUNCTURE: CPT | Performed by: STUDENT IN AN ORGANIZED HEALTH CARE EDUCATION/TRAINING PROGRAM

## 2024-05-24 PROCEDURE — 86038 ANTINUCLEAR ANTIBODIES: CPT | Mod: GEALAB | Performed by: PHYSICIAN ASSISTANT

## 2024-05-24 PROCEDURE — 83540 ASSAY OF IRON: CPT | Performed by: STUDENT IN AN ORGANIZED HEALTH CARE EDUCATION/TRAINING PROGRAM

## 2024-05-24 PROCEDURE — 93325 DOPPLER ECHO COLOR FLOW MAPG: CPT | Performed by: STUDENT IN AN ORGANIZED HEALTH CARE EDUCATION/TRAINING PROGRAM

## 2024-05-24 PROCEDURE — 93325 DOPPLER ECHO COLOR FLOW MAPG: CPT

## 2024-05-24 PROCEDURE — 82805 BLOOD GASES W/O2 SATURATION: CPT | Performed by: PHYSICIAN ASSISTANT

## 2024-05-24 PROCEDURE — 99239 HOSP IP/OBS DSCHRG MGMT >30: CPT | Performed by: FAMILY MEDICINE

## 2024-05-24 PROCEDURE — 2500000001 HC RX 250 WO HCPCS SELF ADMINISTERED DRUGS (ALT 637 FOR MEDICARE OP): Performed by: STUDENT IN AN ORGANIZED HEALTH CARE EDUCATION/TRAINING PROGRAM

## 2024-05-24 PROCEDURE — 84075 ASSAY ALKALINE PHOSPHATASE: CPT | Performed by: STUDENT IN AN ORGANIZED HEALTH CARE EDUCATION/TRAINING PROGRAM

## 2024-05-24 PROCEDURE — 2500000001 HC RX 250 WO HCPCS SELF ADMINISTERED DRUGS (ALT 637 FOR MEDICARE OP): Performed by: FAMILY MEDICINE

## 2024-05-24 PROCEDURE — 86141 C-REACTIVE PROTEIN HS: CPT | Mod: GEALAB | Performed by: STUDENT IN AN ORGANIZED HEALTH CARE EDUCATION/TRAINING PROGRAM

## 2024-05-24 PROCEDURE — 82947 ASSAY GLUCOSE BLOOD QUANT: CPT

## 2024-05-24 PROCEDURE — RXMED WILLOW AMBULATORY MEDICATION CHARGE

## 2024-05-24 PROCEDURE — 83880 ASSAY OF NATRIURETIC PEPTIDE: CPT | Performed by: STUDENT IN AN ORGANIZED HEALTH CARE EDUCATION/TRAINING PROGRAM

## 2024-05-24 PROCEDURE — 2500000001 HC RX 250 WO HCPCS SELF ADMINISTERED DRUGS (ALT 637 FOR MEDICARE OP): Performed by: PHYSICIAN ASSISTANT

## 2024-05-24 RX ORDER — COLCHICINE 0.6 MG/1
0.6 TABLET ORAL DAILY
Qty: 30 TABLET | Refills: 2 | Status: SHIPPED | OUTPATIENT
Start: 2024-05-25 | End: 2024-06-07 | Stop reason: SINTOL

## 2024-05-24 RX ORDER — FAMOTIDINE 20 MG/1
20 TABLET, FILM COATED ORAL 2 TIMES DAILY
Status: DISCONTINUED | OUTPATIENT
Start: 2024-05-24 | End: 2024-05-24 | Stop reason: HOSPADM

## 2024-05-24 RX ORDER — COLCHICINE 0.6 MG/1
1.2 TABLET ORAL ONCE
Status: COMPLETED | OUTPATIENT
Start: 2024-05-24 | End: 2024-05-24

## 2024-05-24 RX ORDER — COLCHICINE 0.6 MG/1
0.6 TABLET ORAL DAILY
Status: DISCONTINUED | OUTPATIENT
Start: 2024-05-25 | End: 2024-05-24 | Stop reason: HOSPADM

## 2024-05-24 RX ORDER — LOSARTAN POTASSIUM 25 MG/1
12.5 TABLET ORAL DAILY
Qty: 30 TABLET | Refills: 0 | Status: SHIPPED | OUTPATIENT
Start: 2024-05-25 | End: 2024-06-06 | Stop reason: WASHOUT

## 2024-05-24 RX ORDER — LOSARTAN POTASSIUM 25 MG/1
12.5 TABLET ORAL DAILY
Status: DISCONTINUED | OUTPATIENT
Start: 2024-05-24 | End: 2024-05-24 | Stop reason: HOSPADM

## 2024-05-24 RX ORDER — MIDODRINE HYDROCHLORIDE 5 MG/1
2.5 TABLET ORAL
Status: DISCONTINUED | OUTPATIENT
Start: 2024-05-24 | End: 2024-05-24 | Stop reason: HOSPADM

## 2024-05-24 RX ORDER — INSULIN GLARGINE 100 [IU]/ML
18 INJECTION, SOLUTION SUBCUTANEOUS EVERY EVENING
Status: DISCONTINUED | OUTPATIENT
Start: 2024-05-24 | End: 2024-05-24 | Stop reason: HOSPADM

## 2024-05-24 RX ADMIN — FAMOTIDINE 20 MG: 20 TABLET, FILM COATED ORAL at 11:40

## 2024-05-24 RX ADMIN — LEVOTHYROXINE SODIUM 100 MCG: 100 TABLET ORAL at 08:18

## 2024-05-24 RX ADMIN — TOPIRAMATE 300 MG: 100 CAPSULE, EXTENDED RELEASE ORAL at 08:18

## 2024-05-24 RX ADMIN — LOSARTAN POTASSIUM 12.5 MG: 25 TABLET, FILM COATED ORAL at 12:19

## 2024-05-24 RX ADMIN — COLCHICINE 1.2 MG: 0.6 TABLET, FILM COATED ORAL at 13:23

## 2024-05-24 ASSESSMENT — PAIN SCALES - GENERAL: PAINLEVEL_OUTOF10: 0 - NO PAIN

## 2024-05-24 NOTE — PROGRESS NOTES
Azucena Hagan is a 53 y.o. female on day 6 of admission presenting with NSTEMI (non-ST elevated myocardial infarction) (Multi).    Subjective   Interval History: no fever, abdominal pain, no emesis        Review of Systems    Objective   Range of Vitals (last 24 hours)  Heart Rate:  []   Temp:  [35.6 °C (96.1 °F)-36.8 °C (98.2 °F)]   Resp:  [14-21]   BP: ()/(40-83)   SpO2:  [98 %-100 %]   Daily Weight  05/18/24 : 54 kg (119 lb)    Body mass index is 21.76 kg/m².    Physical Exam  Constitutional:       Appearance: Normal appearance.   HENT:      Head: Normocephalic and atraumatic.      Mouth/Throat:      Mouth: Mucous membranes are moist.      Pharynx: Oropharynx is clear.   Eyes:      Pupils: Pupils are equal, round, and reactive to light.   Cardiovascular:      Rate and Rhythm: Normal rate and regular rhythm.      Heart sounds: Normal heart sounds.   Pulmonary:      Effort: Pulmonary effort is normal.      Breath sounds: Normal breath sounds.   Abdominal:      General: Abdomen is flat. Bowel sounds are normal.      Palpations: Abdomen is soft.      Tenderness: There is abdominal tenderness.   Musculoskeletal:      Cervical back: Normal range of motion.   Neurological:      Mental Status: She is alert.         Antibiotics  ALPRAZolam (Xanax) tablet 1 mg  cyclobenzaprine (Flexeril) tablet 10 mg  levothyroxine (Synthroid, Levoxyl) tablet 100 mcg  temazepam (Restoril) capsule 30 mg  topiramate (Topamax) tablet 150 mg  insulin glargine (Lantus) injection 35 Units  glucagon (Glucagen) injection 1 mg  dextrose 50 % injection 25 g  glucagon (Glucagen) injection 1 mg  dextrose 50 % injection 12.5 g  insulin lispro (HumaLOG) injection 0-15 Units  heparin bolus from bag 3,102 Units  heparin 25,000 Units in dextrose 5% 250 mL (100 Units/mL) infusion (premix)  heparin bolus from bag 1,500-3,000 Units  metoprolol tartrate (Lopressor) injection 5 mg  piperacillin-tazobactam-dextrose (Zosyn) IV 3.375  g  metoclopramide (Reglan) injection 10 mg  sodium chloride 0.9% infusion  topiramate (Topamax) tablet 200 mg  atorvastatin (Lipitor) tablet 40 mg  aspirin chewable tablet 81 mg  metoprolol tartrate (Lopressor) tablet 25 mg  insulin glargine (Lantus) injection 35 Units  insulin lispro (HumaLOG) injection 0-15 Units  potassium chloride CR (Klor-Con M20) ER tablet 40 mEq  insulin lispro (HumaLOG) injection 0-15 Units  LORazepam (Ativan) injection  - Omnicell Override Pull  LORazepam (Ativan) injection 1 mg  dextrose 50 % injection 25 g  insulin glargine (Lantus) injection 20 Units  potassium chloride (Klor-Con) packet 60 mEq  potassium chloride CR (Klor-Con M20) ER tablet 60 mEq  potassium chloride CR (Klor-Con M20) ER tablet 60 mEq  potassium chloride 20 mEq in 100 mL IV premix  magnesium sulfate IV 2 g      Relevant Results  Labs  Results from last 72 hours   Lab Units 05/24/24  0517 05/23/24  0548 05/22/24  1229 05/22/24  0536   WBC AUTO x10*3/uL 11.8* 11.1 11.1 11.1   HEMOGLOBIN g/dL 11.1* 13.0 13.4 13.3   HEMATOCRIT % 34.3* 39.6 41.7 39.3   PLATELETS AUTO x10*3/uL 418 424 340 363   NEUTROS PCT AUTO % 45.8 48.2  --  55.1   LYMPHS PCT AUTO % 40.2 35.0  --  29.2   MONOS PCT AUTO % 7.6 7.7  --  6.7   EOS PCT AUTO % 2.0 2.4  --  3.8     Results from last 72 hours   Lab Units 05/24/24  0517 05/23/24  0548 05/22/24  1229   SODIUM mmol/L 138 136 135*   POTASSIUM mmol/L 3.3* 3.5 4.0   CHLORIDE mmol/L 115* 110* 109*   CO2 mmol/L 15* 17* 17*   BUN mg/dL 7 8 10   CREATININE mg/dL 0.68 0.87 0.95   GLUCOSE mg/dL 69* 120* 128*   CALCIUM mg/dL 7.3* 7.5* 7.5*   ANION GAP mmol/L 11 13 13   EGFR mL/min/1.73m*2 >90 81 73     Results from last 72 hours   Lab Units 05/24/24  0517 05/23/24  0548 05/22/24  0536   ALK PHOS U/L 40 51 56   BILIRUBIN TOTAL mg/dL 0.5 0.5 0.6   PROTEIN TOTAL g/dL 4.5* 5.2* 5.1*   ALT U/L 23 28 28   AST U/L 20 40* 14   ALBUMIN g/dL 2.4* 2.8* 2.7*     Estimated Creatinine Clearance: 77.4 mL/min (by C-G formula  based on SCr of 0.68 mg/dL).  C-Reactive Protein   Date Value Ref Range Status   05/19/2024 9.72 (H) <1.00 mg/dL Final   11/22/2023 0.30 <1.00 mg/dL Final     CRP   Date Value Ref Range Status   06/20/2023 0.56 mg/dL Final     Comment:     REF VALUE  < 1.00       Microbiology  reviewed  Imaging  reviewed        Assessment/Plan    Suspected aspiration pneumonia  Abdominal pain, better, US is negative      Recommendations :  Continue Zosyn, plan to stop with discharge   Discussed with the medical team    I spent minutes in the professional and overall care of this patient.      Khari Williamson MD

## 2024-05-24 NOTE — PROGRESS NOTES
Subjective Data:  Patient reports feeling fatigued. Ready to go home.   No chest pain or pressure. No dizziness currently.  Was dizzy yesterday with standing (hypotensive).     Overnight Events:    No acute issues reported overnight.      Objective Data:  Last Recorded Vitals:  Vitals:    05/23/24 1734 05/23/24 1735 05/23/24 2026 05/24/24 0614   BP: 108/72 (!) 61/40 125/83 117/77   BP Location: Right arm Right arm Right arm Left arm   Patient Position: Sitting Standing Lying Lying   Pulse: 100 106 107 96   Resp:   21 14   Temp:   36.8 °C (98.2 °F) 36.1 °C (97 °F)   TempSrc:   Temporal Temporal   SpO2:   99% 98%   Weight:       Height:           Last Labs:  CBC - 5/24/2024:  5:17 AM  11.8 11.1 418    34.3      CMP - 5/24/2024:  5:17 AM  7.3 4.5 20 --- 0.5   1.3; CANCELED 2.4 23 40      PTT - 5/18/2024:  6:25 AM  1.1   12.5 20     TROPHS   Date/Time Value Ref Range Status   05/22/2024 12:29  0 - 13 ng/L Final   05/20/2024 05:30  0 - 13 ng/L Final   05/19/2024 05:54 AM 1,170 0 - 13 ng/L Final     HGBA1C   Date/Time Value Ref Range Status   05/20/2024 05:30 AM 8.0 see below % Final   10/24/2023 02:56 PM 9.5 4.2 - 6.5 % Final   06/21/2023 05:52 AM 13.6 % Final     Comment:          Diagnosis of Diabetes-Adults   Non-Diabetic: < or = 5.6%   Increased risk for developing diabetes: 5.7-6.4%   Diagnostic of diabetes: > or = 6.5%  .       Monitoring of Diabetes                Age (y)     Therapeutic Goal (%)   Adults:          >18           <7.0   Pediatrics:    13-18           <7.5                   7-12           <8.0                   0- 6            7.5-8.5   American Diabetes Association. Diabetes Care 33(S1), Jan 2010.     06/20/2023 06:39 AM 13.8 % Final     Comment:          Diagnosis of Diabetes-Adults   Non-Diabetic: < or = 5.6%   Increased risk for developing diabetes: 5.7-6.4%   Diagnostic of diabetes: > or = 6.5%  .       Monitoring of Diabetes                Age (y)     Therapeutic Goal (%)   Adults:  "         >18           <7.0   Pediatrics:    13-18           <7.5                   7-12           <8.0                   0- 6            7.5-8.5   American Diabetes Association. Diabetes Care 33(S1), Jan 2010.       LDLCALC   Date/Time Value Ref Range Status   05/19/2024 05:54  <=99 mg/dL Final     Comment:                                 Near   Borderline      AGE      Desirable  Optimal    High     High     Very High     0-19 Y     0 - 109     ---    110-129   >/= 130     ----    20-24 Y     0 - 119     ---    120-159   >/= 160     ----      >24 Y     0 -  99   100-129  130-159   160-189     >/=190       VLDL   Date/Time Value Ref Range Status   05/19/2024 05:54 AM 20 0 - 40 mg/dL Final      Last I/O:  I/O last 3 completed shifts:  In: 2980 (55.2 mL/kg) [P.O.:480; IV Piggyback:2500]  Out: 850 (15.7 mL/kg) [Urine:850 (0.4 mL/kg/hr)]  Weight: 54 kg     Past Cardiology Tests (Last 3 Years):  EKG:  ECG 12 lead 05/18/2024 (Preliminary)  ECG 12 lead 05/18/2024 (Preliminary)  ECG 12 lead 05/18/2024 (Preliminary)  ECG 12 lead 12/05/2023 (Preliminary)  ECG 12 lead 12/05/2023 (Preliminary)  ECG 12 lead 12/04/2023 (Preliminary)     Echo:  Transthoracic Echocardiogram (05/24/24):     Transthoracic Echocardiogram (05/20/24):    CONCLUSIONS:   1. Left ventricular systolic function is moderately to severely decreased with a 30% estimated ejection fraction.   2. There is global hypokinesis of the left ventricle with minor regional variations.     Ejection Fractions:  No results found for: \"EF\"  Cath:  No results found for this or any previous visit from the past 1095 days.     Stress Test:  No results found for this or any previous visit from the past 1095 days.     Imaging:  US RUQ (05/19/2024):   IMPRESSION:  No cholelithiasis or sonographic evidence of acute cholecystitis.     CT Head w/o (05/18/2024):   IMPRESSION:  No CT evidence for acute intracranial pathology.     CT Abd/pelvis (05/18/2024):   IMPRESSION:  No CT " evidence for acute pathology within the abdomen or pelvis.      CT Angio Chest for PE (05/18/20240:   IMPRESSION:  1. No pulmonary embolus.  2. Small patchy airspace opacity in the right lower lobe with  tree-in-bud opacities, and also present to a lesser degree in the  left lower lobe and lateral segment of the right middle lobe.  Findings are concerning for atypical or early multifocal pneumonia.  Aspiration could also have this appearance given the findings of  severe reflux. The esophagus is patulous with extensive fluid  throughout the esophagus.      CXR (05/18/2024):  IMPRESSION:  No acute cardiopulmonary process.    Inpatient Medications:  Scheduled medications   Medication Dose Route Frequency    atorvastatin  80 mg oral Nightly    [Held by provider] carvedilol  3.125 mg oral BID    insulin glargine  20 Units subcutaneous q PM    insulin lispro  0-15 Units subcutaneous TID    levothyroxine  100 mcg oral Daily before breakfast    piperacillin-tazobactam  3.375 g intravenous q6h    [Held by provider] spironolactone  12.5 mg oral Daily    topiramate  300 mg oral Daily     PRN medications   Medication    acetaminophen    Or    acetaminophen    Or    acetaminophen    ALPRAZolam    cyclobenzaprine    dextrose    dextrose    glucagon    glucagon    metoclopramide    temazepam     Continuous Medications   Medication Dose Last Rate       Physical Exam:  Physical Exam  HENT:      Head: Normocephalic.      Nose: Nose normal.      Mouth/Throat:      Mouth: Mucous membranes are moist.   Eyes:      Conjunctiva/sclera: Conjunctivae normal.   Neck:      Comments: No JVD  Cardiovascular:      Rate and Rhythm: Regular rhythm. Tachycardia present.      Heart sounds: No murmur heard.  Pulmonary:      Effort: Pulmonary effort is normal.   Abdominal:      Palpations: Abdomen is soft.   Musculoskeletal:         General: Normal range of motion.      Right lower leg: No edema.      Left lower leg: No edema.   Skin:     General:  Skin is warm.   Neurological:      General: No focal deficit present.      Mental Status: She is alert. Mental status is at baseline.   Psychiatric:         Mood and Affect: Mood normal.         Behavior: Behavior normal.            Assessment/Plan   Azucena Hagan is a 54 yo female with a PMH of DM Type II, Hypothyroidism, DDD, Anxiety who presented tot he ED with vomiting, nausea and R sided chest pain. Was admitted for c/f of pneumonia, NSTEMI. Trop 330> 655> 1049 >1170> 644     #Concern for myocarditis   #Acute systolic heart failure, NICM (EF 30%), newly diagnosed#Non-MI troponin elevation 2/2 above  #Hypotension, combination of Drug induced and vomiting with decrease po intake. Orthostasis   #N/V, Abd pain (resolved)  #DM Type II, uncontrolled  #Malnourishment      -[facility] completed with minimal disease. LVEDP 11mmHg  -echocardiogram recommended, reviewed with mod-severe LVSF dysfunction, EF 30%.   -repeat echocardiogram today with improvement           Plan  -Replete electrolytes per protocol  Goal K4/Phos3/Mg2  -Continue atorvastatin 80mg daily   -she did not tolerate metoprolol, we started her on small dose coreg this AM and she did tolerate it well. Will not persue BB therapy at this time.   -she did not tolerate entresto, will start small dose losartan. She wants to start it tomorrow. Recommend 12.5 mg every day. Started today, will see if patient can tolerate.   -Aldactone discontinued d/t hypotension dehydration   -No SGLT2 at this time given hx of DKA  -HF navigator consult placed  -Will monitor after starting Losartan and Midodrine. If BP stable and patient without near syncope ok for discharge.   -She will monitor her bp at home and make sure she wont get hypotensive. She is at risk for readmission    -Recommend cardiac MRI outpatient  -follow ups have been arranged with Dr. Guido for general cardiology and new referral to Dr. Ferreira.   -cardiac rehab referral has been placed   -Start Colchicine  1.2mg daily then 0.6mg daily       Peripheral IV 05/18/24 20 G Left Antecubital (Active)   Site Assessment Clean;Dry;Intact 05/23/24 2028   Dressing Status Clean;Dry 05/23/24 2028   Number of days: 6       Peripheral IV 05/18/24 20 G Left Forearm (Active)   Site Assessment Clean;Dry;Intact 05/23/24 2028   Dressing Status Clean;Dry 05/23/24 2028   Number of days: 6       Code Status:  DNR and No Intubation    I spent  minutes in the professional and overall care of this patient.        Olga Ferreira PA-C

## 2024-05-24 NOTE — PROGRESS NOTES
"Azucena Hagan is a 53 y.o. female on day 6 of admission presenting with NSTEMI (non-ST elevated myocardial infarction) (Multi).    Subjective   No new complaint     Scheduled medications  atorvastatin, 80 mg, oral, Nightly  [START ON 5/25/2024] colchicine, 0.6 mg, oral, Daily  colchicine, 1.2 mg, oral, Once  famotidine, 20 mg, oral, BID  insulin glargine, 20 Units, subcutaneous, q PM  insulin lispro, 0-15 Units, subcutaneous, TID  levothyroxine, 100 mcg, oral, Daily before breakfast  losartan, 12.5 mg, oral, Daily  midodrine, 2.5 mg, oral, TID  piperacillin-tazobactam, 3.375 g, intravenous, q6h  [Held by provider] spironolactone, 12.5 mg, oral, Daily  topiramate, 300 mg, oral, Daily      Objective   Physical Exam  Constitutional:       General: She is not in acute distress.  Neurological:      Mental Status: She is alert.         Last Recorded Vitals  Blood pressure 140/90, pulse 98, temperature 35.6 °C (96.1 °F), resp. rate 14, height 1.575 m (5' 2.01\"), weight 54 kg (119 lb), SpO2 99%.  Intake/Output last 3 Shifts:  I/O last 3 completed shifts:  In: 2980 (55.2 mL/kg) [P.O.:480; IV Piggyback:2500]  Out: 850 (15.7 mL/kg) [Urine:850 (0.4 mL/kg/hr)]  Weight: 54 kg     Relevant Results  Results from last 7 days   Lab Units 05/24/24  0822 05/24/24  0517 05/23/24 2016 05/23/24  1229 05/23/24  0548 05/22/24  1229 05/22/24  0536 05/21/24  0605 05/20/24  0530 05/19/24  0810 05/19/24  0554 05/19/24  0439   POCT GLUCOSE mg/dL 65*  --  104* 141*  --   --   --   --   --  181*  --  163*   GLUCOSE mg/dL  --  69*  --   --  120* 128* 118* 82   < >  --    < >  --     < > = values in this interval not displayed.       Assessment/Plan   Principal Problem:    NSTEMI (non-ST elevated myocardial infarction) (Multi)      MPRESSION:  TYPE 2 DIABETES MELLITUS WITH HYPERGLYCEMIA  LONG TERM CURRENT INSULIN USE  Chronically poor glucose control on basal insulin only  Stated allergy to aspart (Novolog)  Hypoglycemic this morning despite " less insulin than on admission  Poor PO intake    RECOMMENDATIONS  Continue glargine 20 units at bedtime tonight  Lispro scale QAC, continue at discharge.  She may need scheduled prandial lispro when PO intake picks up.     Ramesh Diaz MD

## 2024-05-24 NOTE — PROGRESS NOTES
05/24/24 1448   Discharge Planning   Living Arrangements Spouse/significant other   Support Systems Spouse/significant other   Assistance Needed A&0x3, ambulates with walker, uses wheelchair PRN, has BSC, showerchair,  room air, no cpap or bipap, not active with any HHC, 2 story house and bedroom is on 2nd floor. Patient stays only on 2nd flooor, spouse works in the day, spouse brings food upstiars while he is at work.   Type of Residence Private residence   Number of Stairs to Enter Residence 5   Number of Stairs Within Residence 19   Do you have animals or pets at home? No   Who is requesting discharge planning? Provider   Home or Post Acute Services In home services   Type of Home Care Services Home nursing visits;Home OT;Home PT   Patient expects to be discharged to: Home with new [facility] ( SN, PT, OT), MD sent internal referral for HHC, patient on room air, medically ready for d/c today, spouse to transport home.   Does the patient need discharge transport arranged? No   Patient Choice   Provider Choice list and CMS website (https://medicare.gov/care-compare#search) for post-acute Quality and Resource Measure Data were provided and reviewed with: Patient

## 2024-05-24 NOTE — DISCHARGE SUMMARY
"Discharge Diagnosis  NSTEMI (non-ST elevated myocardial infarction) (Multi)    Issues Requiring Follow-Up  Cardiology follow up with advanced HF clinic and myocarditis  PCP follow up for post-hospitalization  Endocrinology follow up for poorly controlled diabetes    Discharge Meds     Your medication list        START taking these medications        Instructions Last Dose Given Next Dose Due   colchicine 0.6 mg tablet  Start taking on: May 25, 2024      Take 1 tablet (0.6 mg) by mouth once daily. Do not fill before May 25, 2024.       insulin lispro 100 unit/mL injection  Commonly known as: HumaLOG      Inject 0-0.15 mL (0-15 Units) under the skin 3 times daily (morning, midday, late afternoon). 0 units if glucose is less than 180, 4 units if glucose is 181-200, 6 units if glucose is 201-250, 8 units if glucose is 251-300, 10 units if glucose is 301-350, 12 units if glucose is 351-400       losartan 25 mg tablet  Commonly known as: Cozaar  Start taking on: May 25, 2024      Take 0.5 tablets (12.5 mg) by mouth once daily.              CHANGE how you take these medications        Instructions Last Dose Given Next Dose Due   Camrese 0.15 mg-30 mcg (84)/10 mcg (7) tablets,dose pack,3 month tablet  Generic drug: L norgest/e.estradioL-e.estrad  What changed: Another medication with the same name was removed. Continue taking this medication, and follow the directions you see here.      TAKE 1 TABLET BY MOUTH ONCE DAILY. *NEED AUGUST APPT*       insulin glargine-yfgn 100 unit/mL (3 mL) Pen  What changed: how much to take      Inject 20 Units under the skin once daily at bedtime.              CONTINUE taking these medications        Instructions Last Dose Given Next Dose Due   ALPRAZolam 1 mg tablet  Commonly known as: Xanax      Take 1 tablet (1 mg) by mouth 3 times a day as needed for anxiety.       BD Ultra-Fine Patience Pen Needle 32 gauge x 5/32\" needle  Generic drug: pen needle, diabetic           BD Patience 2nd Gen Pen " "Needle 32 gauge x 5/32\" needle  Generic drug: pen needle, diabetic      USE FOR INSULIN INJECTIONS FOUR TIMES DAILY AS DIRECTED       EPINEPHrine 0.3 mg/0.3 mL injection syringe  Commonly known as: Epipen      Inject 0.3 mL (0.3 mg) into the muscle 1 time if needed for anaphylaxis for up to 2 doses.       famotidine 10 mg tablet  Commonly known as: Pepcid           FreeStyle Alvarado 2 [place] misc  Generic drug: flash glucose scanning reader      Use as instructed. Scan sensor for glucose readings as directed       FreeStyle Alvarado 2 Sensor kit  Generic drug: flash glucose sensor kit      For continuous glucose monitoring, change sensor every 14 days       lansoprazole 30 mg DR capsule  Commonly known as: Prevacid      TAKE 1 CAPSULE (30 MG) BY MOUTH 2 TIMES A DAY.       naloxone 4 mg/0.1 mL nasal spray  Commonly known as: Narcan           ondansetron ODT 8 mg disintegrating tablet  Commonly known as: Zofran-ODT      DISSOLVE 1 TABLET IN MOUTH EVERY 8 HOURS AS NEEDED       ONETOUCH DELICA SAFETY LANCET MISC           OneTouch Delica Plus Lancet 33 gauge misc  Generic drug: lancets           Synthroid 100 mcg tablet  Generic drug: levothyroxine      Take 1 tablet (100 mcg) by mouth once daily in the morning. Take before meals.       temazepam 30 mg capsule  Commonly known as: Restoril      Take 1 capsule (30 mg) by mouth as needed at bedtime (insomnia).       Trokendi  mg capsule,extended release 24hr  Generic drug: topiramate      Take 300 mg by mouth once daily. In addition to the  50mg cap for total of 350mg daily       Trokendi XR 50 mg capsule,extended release 24hr  Generic drug: topiramate      TAKE 1 CAPSULE(50MG) BY MOUTH ONCE DAILY WITH 300MG TROKENDI FOR TOTAL DAILY DOSE OF 350MG              STOP taking these medications      clindamycin 1 % external solution  Commonly known as: Cleocin T        cyclobenzaprine 10 mg tablet  Commonly known as: Flexeril                  Where to Get Your Medications    "     These medications were sent to [facility]  [address], [place] [address]      Hours: 9 AM to 5 PM Mon-Fri Phone: [phone]   colchicine 0.6 mg tablet  insulin glargine-yfgn 100 unit/mL (3 mL) Pen  insulin lispro 100 unit/mL injection  losartan 25 mg tablet         Test Results Pending At Discharge  Pending Labs       Order Current Status    JOE with Reflex to JONATHAN In process    C-Reactive Protein, High Sensitivity In process            Hospital Course  54yo CF with PMH of hypothyroidism, IDDM-type II, GERD, anxiety, and chronic back pain presented to the ED with N/V and was admitted for possible NSTEMI but found to be demand ischemia secondary to new onset heart failure and possible myocarditis. Patient underwent cardiac cath on 5/20 which was negative but echo showed EF 30% so she was started on GDMT but did not tolerate it well with multiple episodes of symptomatic hypotension and orthostatic hypotension requiring fluids bolus. Patient refused to take any further diuretics, beta blockers, and statins. Patient was agreeable to take losartan and colchicine per repeat echo showing concerns for myocarditis. Patient was also seen by endocrinology for poorly controlled DM-type II who recommended starting lantus 20u qPM and ISS which she was discharged on. Patient was seen by PT/OT who recommended home health care which patient was agreeable to. Patient appears medically stable for discharge and was agreeable to plan.    Medically cleared for discharge. Medications reviewed and reconciled. Scripts prepared as needed.  Discussed with the family, , and . Questions answered. Understanding verbalized. Overall time spent on discharge was longer than 35 min.     Pertinent Physical Exam At Time of Discharge  Constitutional: alert and oriented x 3, awake, cooperative, no acute distress  Skin: warm and dry  Head/Neck: Normocephalic, atraumatic  Eyes: clear sclera  ENMT: mucous  membranes moist  Cardio: Regular rate and rhythm  Resp: CTA bilaterally, good respiratory effort  Gastrointestinal: Soft, nontender, nondistended, BSx4  Musculoskeletal: generalized weakness-at baseline per   Neuro: alert and oriented x 3  Psychological: Appropriate mood and behavior    Outpatient Follow-Up  Future Appointments   Date Time Provider Department Center   5/25/2024 To Be Determined Kassy Boogie RN [facility]   5/28/2024 To Be Determined Hanny Monzon, OT [facility]   5/29/2024  3:40 PM Martir Stokes MD [id number] [facility]   5/30/2024 To Be Determined Inga Rodriguez, PT [facility]   6/7/2024  1:40 PM Bertrand Griffin MD [id number] [facility]   7/12/2024  2:30 PM Landry Ferreira MD [id number] [facility]   9/12/2024  3:00 PM Nader López MD [id number] [facility]         Shanell Nolan DO

## 2024-05-24 NOTE — CARE PLAN
The patient's goals for the shift include to not be nauseated    The clinical goals for the shift include Patient will be free from pain

## 2024-05-25 LAB
ATRIAL RATE: 107 BPM
ATRIAL RATE: 113 BPM
P AXIS: 61 DEGREES
P AXIS: 68 DEGREES
P OFFSET: 212 MS
P OFFSET: 212 MS
P ONSET: 155 MS
P ONSET: 156 MS
PR INTERVAL: 142 MS
PR INTERVAL: 148 MS
Q ONSET: 227 MS
Q ONSET: 229 MS
QRS COUNT: 17 BEATS
QRS COUNT: 19 BEATS
QRS DURATION: 74 MS
QRS DURATION: 82 MS
QT INTERVAL: 338 MS
QT INTERVAL: 366 MS
QTC CALCULATION(BAZETT): 463 MS
QTC CALCULATION(BAZETT): 488 MS
QTC FREDERICIA: 417 MS
QTC FREDERICIA: 444 MS
R AXIS: 17 DEGREES
R AXIS: 30 DEGREES
T AXIS: 53 DEGREES
T AXIS: 63 DEGREES
T OFFSET: 398 MS
T OFFSET: 410 MS
VENTRICULAR RATE: 107 BPM
VENTRICULAR RATE: 113 BPM

## 2024-05-28 LAB
ANA SER QL HEP2 SUBST: NEGATIVE
ATRIAL RATE: 107 BPM
P AXIS: 52 DEGREES
P OFFSET: 191 MS
P ONSET: 134 MS
PR INTERVAL: 158 MS
Q ONSET: 213 MS
QRS COUNT: 18 BEATS
QRS DURATION: 72 MS
QT INTERVAL: 362 MS
QTC CALCULATION(BAZETT): 483 MS
QTC FREDERICIA: 439 MS
R AXIS: 41 DEGREES
T AXIS: 59 DEGREES
T OFFSET: 394 MS
VENTRICULAR RATE: 107 BPM

## 2024-05-28 NOTE — SIGNIFICANT EVENT
Follow Up Phone Call    Outgoing phone call    Spoke to: Azucena Hagan Relationship:self   Phone number: [phone]      Outcome: I left a message on answering machine   No chief complaint on file.         Diagnosis:Not applicable

## 2024-05-29 ENCOUNTER — APPOINTMENT (OUTPATIENT)
Dept: PAIN MEDICINE | Facility: CLINIC | Age: 54
End: 2024-05-29
Payer: COMMERCIAL

## 2024-05-31 ENCOUNTER — TELEPHONE (OUTPATIENT)
Dept: HOME HEALTH SERVICES | Facility: HOME HEALTH | Age: 54
End: 2024-05-31
Payer: COMMERCIAL

## 2024-05-31 NOTE — TELEPHONE ENCOUNTER
Hello,    Your recent home care referral for Azucena ALBERTS Fannyalec has been made a Non Admit with  Home Care due to Inability to Contact Patient. If you have further questions, feel free to reach out to our office at [phone].     Thank you,   [facility]

## 2024-06-02 LAB
ATRIAL RATE: 109 BPM
P AXIS: 73 DEGREES
P OFFSET: 212 MS
P ONSET: 165 MS
PR INTERVAL: 120 MS
Q ONSET: 225 MS
QRS COUNT: 18 BEATS
QRS DURATION: 66 MS
QT INTERVAL: 352 MS
QTC CALCULATION(BAZETT): 474 MS
QTC FREDERICIA: 429 MS
R AXIS: 56 DEGREES
T AXIS: 55 DEGREES
T OFFSET: 401 MS
VENTRICULAR RATE: 109 BPM

## 2024-06-03 PROCEDURE — RXMED WILLOW AMBULATORY MEDICATION CHARGE

## 2024-06-05 ENCOUNTER — PHARMACY VISIT (OUTPATIENT)
Dept: PHARMACY | Facility: CLINIC | Age: 54
End: 2024-06-05
Payer: COMMERCIAL

## 2024-06-05 PROCEDURE — RXMED WILLOW AMBULATORY MEDICATION CHARGE

## 2024-06-05 NOTE — DOCUMENTATION CLARIFICATION NOTE
"    PATIENT:               [address]  ACCT #:                  [id number]  MRN:                       [id number]  :                       1970  ADMIT DATE:       2024 2:29 PM  DISCH DATE:        2024 4:00 PM  RESPONDING PROVIDER #:        [id number]          PROVIDER RESPONSE TEXT:    Demand Ischemia    CDI QUERY TEXT:    Clarification        Instruction:    Based on your assessment of the patient and the clinical information, please provide the requested documentation by clicking on the appropriate radio button and enter any additional information if prompted.    Question: Is there a diagnosis indicative of the patient elevated Troponins and symptoms    When answering this query, please exercise your independent professional judgment. The fact that a question is being asked, does not imply that any particular answer is desired or expected.    The patient's clinical indicators include:  Clinical Information:  53 year old sent to [facility] with upper abdominal pain, vomiting.  Felt to have NSTEMI.  Transferred to [facility].  Left heart cath on  non-obstructive CAD.   NSTEMI, demand ischemia, and Type II MI are all listed, please clarify which one she had.    \"NSTEMI\" per [facility], H&P by medicine, H&P by cardiology, ,  by cardiology, , ,  by endocrine, , ,  by ID, in DC summary    \"C/F NSTEMI\" on , , , ,  per cardiology    \"NSTEMI vs demand ischemia\" by cardiology     \"Type II MI\" in post procedure diagnosis on cath report .    \"Demand ischemia\" by medicine , , , DC summary      Clinical Indicators:  Troponins at [facility] 330, then 655.  Troponin on arrival to [facility]:  1049 on @15:22  1170 on @05:54  644 on @05:30  110 on @12:29    Treatment: Cardiology consult, LHC, ECHO, started Losartan and Midodrine PO.  Heparin drip    Risk Factors:   IDDM2, CHF, HTN, cardiomyopathy  Options provided:  -- NSTEMI  -- Demand " Ischemia  -- Type II MI  -- Troponin elevation due to other, Please specify additional information below  -- Other - I will add my own diagnosis  -- Refer to Clinical Documentation Reviewer    Query created by: Gabriela Acosta on 5/29/2024 2:29 PM      Electronically signed by:  FOREST GOLDSTEIN DO 6/5/2024 7:19 AM

## 2024-06-07 ENCOUNTER — TELEMEDICINE (OUTPATIENT)
Dept: CARDIOLOGY | Facility: CLINIC | Age: 54
End: 2024-06-07
Payer: COMMERCIAL

## 2024-06-07 DIAGNOSIS — I50.41 ACUTE COMBINED SYSTOLIC AND DIASTOLIC CONGESTIVE HEART FAILURE (MULTI): ICD-10-CM

## 2024-06-07 DIAGNOSIS — I31.9 MYOPERICARDITIS (HHS-HCC): Primary | ICD-10-CM

## 2024-06-07 PROCEDURE — 99214 OFFICE O/P EST MOD 30 MIN: CPT | Performed by: INTERNAL MEDICINE

## 2024-06-07 PROCEDURE — 4010F ACE/ARB THERAPY RXD/TAKEN: CPT | Performed by: INTERNAL MEDICINE

## 2024-06-07 PROCEDURE — 3052F HG A1C>EQUAL 8.0%<EQUAL 9.0%: CPT | Performed by: INTERNAL MEDICINE

## 2024-06-07 PROCEDURE — RXMED WILLOW AMBULATORY MEDICATION CHARGE

## 2024-06-07 PROCEDURE — 3049F LDL-C 100-129 MG/DL: CPT | Performed by: INTERNAL MEDICINE

## 2024-06-07 PROCEDURE — 3008F BODY MASS INDEX DOCD: CPT | Performed by: INTERNAL MEDICINE

## 2024-06-07 RX ORDER — IBUPROFEN 800 MG/1
800 TABLET ORAL 2 TIMES DAILY
Qty: 20 TABLET | Refills: 0 | Status: SHIPPED | OUTPATIENT
Start: 2024-06-07 | End: 2024-06-21

## 2024-06-07 RX ORDER — LOSARTAN POTASSIUM 25 MG/1
12.5 TABLET ORAL DAILY
Start: 2024-06-07

## 2024-06-07 ASSESSMENT — ENCOUNTER SYMPTOMS
MEMORY LOSS: 0
BLOATING: 0
DIARRHEA: 0
CONSTIPATION: 0
WHEEZING: 0
MYALGIAS: 0
DEPRESSION: 0
COUGH: 0
CHILLS: 0
DYSURIA: 0
HEADACHES: 0
FALLS: 0
FEVER: 0
HEMOPTYSIS: 0
ALTERED MENTAL STATUS: 0
ABDOMINAL PAIN: 0
VOMITING: 0
NAUSEA: 0
HEMATURIA: 0

## 2024-06-07 NOTE — PROGRESS NOTES
Chief complaint:  Syncope/TPN elevation  (Consult requested by C Ray)     Virtual or Telephone Consent    An interactive audio and video telecommunication system which permits real time communications between the patient (at the originating site) and provider (at the distant site) was utilized to provide this telehealth service.   Verbal consent was requested and obtained from Azucena Hagan on this date, 06/07/24 for a telehealth visit.      HPI  54 yo WF w/ h/o DM, hypothyroid, long COVID, anxiety now on video for cardiology fu. 5/24 hosp for N/V/CP -> dx'ed with myopericarditis. No further N/V/CP.  No chest pain. No dyspnea at rest. +occ ANDERSON. No orthopnea/PND. +occ mild palps x few minutes (prev improved on Bystolic). No edema. No claudication. No cough.   11/23 hosp for N/V followed by syncope felt to be due to dehydration (K 2.7,, HSTPN 232). No further episodes.  BP at home: 90s/60s HR 90s  ECG 6/23: ST (122)  ECG 11/23: ST (123), nonsp ST_T changes  ECG 11/23: ST (119), low volt  ECG 5/24: ), LV, ant TWIs  ECG 5/24: ST (109), LV, nonsp T-wave changes  Echo 5/24: EF 30%  Echo 5/24: EF 45-50%  Cath 5/24: mLAD 30%, LVEDP 11, no AVS  CXR 11/23: no acute abnl  CXR 5/24: no acute abnl  CTA chest 5/24: no PE, nl PA, nl Ao, nl heart size, no peric eff  CT ab 6/23: nl heart size, no peric eff  CT ab 11/23: no AAA  CT ab 5/24: Ao unremark  CT brain 5/24: no acute abnl    Review of Systems   Constitutional: Negative for chills, fever and malaise/fatigue.   HENT:  Negative for hearing loss.    Eyes:  Negative for visual disturbance.   Respiratory:  Negative for cough, hemoptysis and wheezing.    Skin:  Negative for rash.   Musculoskeletal:  Negative for falls and myalgias.   Gastrointestinal:  Negative for bloating, abdominal pain, constipation, diarrhea, dysphagia, nausea and vomiting.   Genitourinary:  Negative for dysuria and hematuria.   Neurological:  Negative for headaches.   Psychiatric/Behavioral:   Negative for altered mental status, depression and memory loss.       Social History     Tobacco Use    Smoking status: Never     Passive exposure: Never    Smokeless tobacco: Never   Substance Use Topics    Alcohol use: Never      Family History   Problem Relation Name Age of Onset    Anxiety disorder Mother Karolyn Terrell     Other (back injury) Mother Karolyn Terrell     Depression Mother Karolyn Terrell         recurrent    Other (cardiac disorder) Father      Heart attack Father      Anxiety disorder Brother Wild     Hypertension Brother Wild     Other (king disease) Brother Wild       Allergies   Allergen Reactions    Nut - Unspecified Anaphylaxis     Pecans only    Pecan Nut Shortness of breath    Cephalexin Other    House Dust Unknown    Hydrocodone-Acetaminophen Hallucinations and Other     Hallucinations    Insulin Aspart Itching    Insulin Regular Unknown    Nickel Other     Nickel-skin irritation    Red Dye Unknown    Statins-Hmg-Coa Reductase Inhibitors Other     Muscle cramping      Tree And Shrub Pollen Swelling    Venom-Wasp Other       Physical Exam  Constitutional:       Appearance: Normal appearance.   Musculoskeletal:      Right lower leg: No edema.      Left lower leg: No edema.   Neurological:      Mental Status: She is alert.        Labs  5/24 Cr 0.68, K 3.3, Mg 1.71, LFT nl, HGB 11.1, , Lex 51, ESR 38, CRP 9.7 -> 3.8, HSTPN 1170,     Assessment/Plan   52 yo WF w/ h/o DM, hypothyroid, long COVID, anxiety now s/p myopericarditis vs Takutsobo (mildly reduced EF). She could not tolerate Colchicine (diarrhea) or Coreg/Entresto (vision loss). Okay to take Ibuprofen 800 bid x 7-10 days (with food). Repeat limited echo in ~1 month to re-eval EF. If remains low, consider cMRI (she claims cannot do unless sedated).  -?intolerant of BB (if EF remains low and BP allows, consider try low dose BB)  -okay to continue Losartan 12.5 every day (if symptomatic hypotension, may have to stop)  -FU  PRN (pending echo)    Bertrand Griffin MD

## 2024-06-11 ENCOUNTER — PHARMACY VISIT (OUTPATIENT)
Dept: PHARMACY | Facility: CLINIC | Age: 54
End: 2024-06-11
Payer: COMMERCIAL

## 2024-06-11 PROCEDURE — RXMED WILLOW AMBULATORY MEDICATION CHARGE

## 2024-06-12 ENCOUNTER — PHARMACY VISIT (OUTPATIENT)
Dept: PHARMACY | Facility: CLINIC | Age: 54
End: 2024-06-12
Payer: COMMERCIAL

## 2024-06-18 ENCOUNTER — APPOINTMENT (OUTPATIENT)
Dept: PRIMARY CARE | Facility: CLINIC | Age: 54
End: 2024-06-18
Payer: COMMERCIAL

## 2024-06-20 ENCOUNTER — APPOINTMENT (OUTPATIENT)
Dept: BEHAVIORAL HEALTH | Facility: CLINIC | Age: 54
End: 2024-06-20
Payer: COMMERCIAL

## 2024-06-20 DIAGNOSIS — F41.9 ANXIETY: ICD-10-CM

## 2024-06-20 PROCEDURE — 3049F LDL-C 100-129 MG/DL: CPT | Performed by: PSYCHIATRY & NEUROLOGY

## 2024-06-20 PROCEDURE — 3052F HG A1C>EQUAL 8.0%<EQUAL 9.0%: CPT | Performed by: PSYCHIATRY & NEUROLOGY

## 2024-06-20 PROCEDURE — 99213 OFFICE O/P EST LOW 20 MIN: CPT | Performed by: PSYCHIATRY & NEUROLOGY

## 2024-06-20 PROCEDURE — 4010F ACE/ARB THERAPY RXD/TAKEN: CPT | Performed by: PSYCHIATRY & NEUROLOGY

## 2024-06-20 RX ORDER — ALPRAZOLAM 1 MG/1
1 TABLET ORAL 3 TIMES DAILY PRN
Qty: 90 TABLET | Refills: 3 | Status: SHIPPED | OUTPATIENT
Start: 2024-06-20 | End: 2024-10-18

## 2024-06-20 RX ORDER — TEMAZEPAM 30 MG/1
30 CAPSULE ORAL NIGHTLY PRN
Qty: 30 CAPSULE | Refills: 3 | Status: SHIPPED | OUTPATIENT
Start: 2024-06-20 | End: 2024-10-18

## 2024-06-20 ASSESSMENT — ENCOUNTER SYMPTOMS: NERVOUS/ANXIOUS: 1

## 2024-06-20 NOTE — PROGRESS NOTES
Subjective   Patient ID: Azucena Hagan is a 53 y.o. female for .  Medication management visit  HPI patient discussed recent hospital stay for non-ST wave MI.  She also had aspiration pneumonia.  Multiple complaints about hospital staff and incompetence.  Continues to take her alprazolam 3 times daily as needed for anxiety and panic symptoms.  Takes temazepam most nights for insomnia 30 mg.  Complains of fatigue due to illness and deconditioning in the hospital.    Review of Systems   Psychiatric/Behavioral:  Negative for self-injury and suicidal ideas. The patient is nervous/anxious.        Objective   Physical Exam  Psychiatric:         Attention and Perception: Attention and perception normal.         Mood and Affect: Mood and affect normal.         Speech: Speech normal.         Behavior: Behavior normal. Behavior is cooperative.         Thought Content: Thought content normal.         Cognition and Memory: Cognition and memory normal.         Judgment: Judgment normal.      Comments: Very critical of hospital staff         Assessment/Plan   Problem List Items Addressed This Visit             ICD-10-CM    Anxiety F41.9     Continue alprazolam 1 mg 3 times daily and temazepam 30 mg at night.  30 days and 3 refills of each follow-up 3 months         Relevant Medications    ALPRAZolam (Xanax) 1 mg tablet    temazepam (Restoril) 30 mg capsule            James Tavarez MD 06/20/24 5:41 PM

## 2024-06-20 NOTE — ASSESSMENT & PLAN NOTE
Continue alprazolam 1 mg 3 times daily and temazepam 30 mg at night.  30 days and 3 refills of each follow-up 3 months

## 2024-06-20 NOTE — ASSESSMENT & PLAN NOTE
>>ASSESSMENT AND PLAN FOR ANXIETY WRITTEN ON 6/20/2024  5:45 PM BY ELHAM PARKER MD    Continue alprazolam 1 mg 3 times daily and temazepam 30 mg at night.  30 days and 3 refills of each follow-up 3 months

## 2024-06-21 ENCOUNTER — PHARMACY VISIT (OUTPATIENT)
Dept: PHARMACY | Facility: CLINIC | Age: 54
End: 2024-06-21
Payer: COMMERCIAL

## 2024-06-21 PROCEDURE — RXMED WILLOW AMBULATORY MEDICATION CHARGE

## 2024-06-26 ENCOUNTER — APPOINTMENT (OUTPATIENT)
Dept: PRIMARY CARE | Facility: CLINIC | Age: 54
End: 2024-06-26
Payer: COMMERCIAL

## 2024-06-26 DIAGNOSIS — J18.9 PNEUMONIA OF RIGHT MIDDLE LOBE DUE TO INFECTIOUS ORGANISM: ICD-10-CM

## 2024-06-26 DIAGNOSIS — Z09 HOSPITAL DISCHARGE FOLLOW-UP: Primary | ICD-10-CM

## 2024-06-26 DIAGNOSIS — I21.4 NSTEMI (NON-ST ELEVATED MYOCARDIAL INFARCTION) (MULTI): ICD-10-CM

## 2024-06-26 PROCEDURE — 3049F LDL-C 100-129 MG/DL: CPT | Performed by: STUDENT IN AN ORGANIZED HEALTH CARE EDUCATION/TRAINING PROGRAM

## 2024-06-26 PROCEDURE — 99215 OFFICE O/P EST HI 40 MIN: CPT | Performed by: STUDENT IN AN ORGANIZED HEALTH CARE EDUCATION/TRAINING PROGRAM

## 2024-06-26 PROCEDURE — 99417 PROLNG OP E/M EACH 15 MIN: CPT | Performed by: STUDENT IN AN ORGANIZED HEALTH CARE EDUCATION/TRAINING PROGRAM

## 2024-06-26 PROCEDURE — 3052F HG A1C>EQUAL 8.0%<EQUAL 9.0%: CPT | Performed by: STUDENT IN AN ORGANIZED HEALTH CARE EDUCATION/TRAINING PROGRAM

## 2024-06-26 NOTE — PROGRESS NOTES
Subjective   Patient ID: Azucena Hagan is a 53 y.o. female who presents for No chief complaint on file..    HPI   Patient presents to the office today for hospital follow-up.  Patient states that she initially started having GI upset with vomiting on 5/14/2024 which improved but had relapsing symptoms of vomiting on 5/17.  She began having severe right-sided chest pain and took aspirin before presenting to the emergency room.  Patient was initially seen at the [facility] on 5/18/2024 but was transferred to the [facility] on 5/18/2024 to 5/24/2024 for a demand ischemia NSTEMI.  Patient was found to have suspected myocarditis and Takotsubo's.  Patient underwent a left heart cath that was clear.  Echocardiogram showed a significantly reduced EF of 30% which through admission increased to 42 to 45% EF.  Patient was also found to have a right middle lobe pneumonia that was treated with Zosyn during the admission.     Since patient is discharged she states that her back pain has been waxing and waning she is currently using I placed pexing temporarily.  She has had profound weakness that has been improving.  She does decline physical therapy at this time and at the time of the admission due to uptick in COVID/infections.  Her blood sugars have been relatively stable she is currently using 16 to 25 units of insulin sliding scale along with her basal.  Her blood pressure has been doing well and her weight has returned to normal.  She has followed up with Dr. Griffin with her cardiologist due to the colchicine causing diarrhea patient is currently using ibuprofen 800 mg for her myocarditis.  This will be followed up with a echocardiogram in 1 month.  Losartan was discontinued due to bradycardia.      Assessment/Plan   There are no diagnoses linked to this encounter.    Hospital discharge follow-up  Patient's notes, imaging and labs were reviewed  Updated patient medication list  Patient to follow  with her cardiologist in regards to a decreased EF which was likely situational.  She was back up to 8 and ejection fraction of 42 to 45% upon discharge.  Her right middle lobe pneumonia was treated with Zosyn on admission plan for follow-up in about a month.  Her physical deconditioning due to the demand ischemia recommended physical therapy however patient states that she can do this at home she will think about home PT as she is concerned about recent uptick in COVID infections.  Blood sugars have been much more manageable since her discharge and is using 16 to 25 units in her insulin sliding scale plus her basal.  Patient to return to care in 1 to 2 months for follow-up for her blood pressure as well as overall weakness         Kaci Saha,  06/26/24 12:16 PM

## 2024-07-01 DIAGNOSIS — K21.9 GASTROESOPHAGEAL REFLUX DISEASE WITHOUT ESOPHAGITIS: ICD-10-CM

## 2024-07-02 PROCEDURE — RXMED WILLOW AMBULATORY MEDICATION CHARGE

## 2024-07-02 RX ORDER — LANSOPRAZOLE 30 MG/1
30 CAPSULE, DELAYED RELEASE ORAL 2 TIMES DAILY
Qty: 180 CAPSULE | Refills: 1 | Status: SHIPPED | OUTPATIENT
Start: 2024-07-02 | End: 2025-07-02

## 2024-07-05 PROCEDURE — RXMED WILLOW AMBULATORY MEDICATION CHARGE

## 2024-07-11 ENCOUNTER — PHARMACY VISIT (OUTPATIENT)
Dept: PHARMACY | Facility: CLINIC | Age: 54
End: 2024-07-11
Payer: COMMERCIAL

## 2024-07-12 ENCOUNTER — APPOINTMENT (OUTPATIENT)
Dept: CARDIOLOGY | Facility: HOSPITAL | Age: 54
End: 2024-07-12
Payer: COMMERCIAL

## 2024-07-15 DIAGNOSIS — M54.41 CHRONIC BILATERAL LOW BACK PAIN WITH RIGHT-SIDED SCIATICA: ICD-10-CM

## 2024-07-15 DIAGNOSIS — G89.29 CHRONIC BILATERAL LOW BACK PAIN WITH RIGHT-SIDED SCIATICA: ICD-10-CM

## 2024-07-15 PROCEDURE — RXMED WILLOW AMBULATORY MEDICATION CHARGE

## 2024-07-15 RX ORDER — CYCLOBENZAPRINE HCL 10 MG
10 TABLET ORAL 3 TIMES DAILY PRN
Qty: 30 TABLET | Refills: 2 | Status: SHIPPED | OUTPATIENT
Start: 2024-07-15

## 2024-07-16 ENCOUNTER — PHARMACY VISIT (OUTPATIENT)
Dept: PHARMACY | Facility: CLINIC | Age: 54
End: 2024-07-16
Payer: COMMERCIAL

## 2024-07-22 ENCOUNTER — PHARMACY VISIT (OUTPATIENT)
Dept: PHARMACY | Facility: CLINIC | Age: 54
End: 2024-07-22
Payer: COMMERCIAL

## 2024-07-22 PROCEDURE — RXMED WILLOW AMBULATORY MEDICATION CHARGE

## 2024-07-23 ENCOUNTER — PHARMACY VISIT (OUTPATIENT)
Dept: PHARMACY | Facility: CLINIC | Age: 54
End: 2024-07-23
Payer: COMMERCIAL

## 2024-07-29 ENCOUNTER — TELEPHONE (OUTPATIENT)
Dept: CARDIAC REHAB | Facility: CLINIC | Age: 54
End: 2024-07-29
Payer: COMMERCIAL

## 2024-08-16 DIAGNOSIS — E11.65 POORLY CONTROLLED TYPE 2 DIABETES MELLITUS (MULTI): ICD-10-CM

## 2024-08-16 PROCEDURE — RXMED WILLOW AMBULATORY MEDICATION CHARGE

## 2024-08-16 RX ORDER — FLASH GLUCOSE SENSOR
KIT MISCELLANEOUS
Qty: 6 EACH | Refills: 0 | Status: SHIPPED | OUTPATIENT
Start: 2024-08-16

## 2024-08-19 ENCOUNTER — PHARMACY VISIT (OUTPATIENT)
Dept: PHARMACY | Facility: CLINIC | Age: 54
End: 2024-08-19
Payer: COMMERCIAL

## 2024-08-20 ENCOUNTER — PHARMACY VISIT (OUTPATIENT)
Dept: PHARMACY | Facility: CLINIC | Age: 54
End: 2024-08-20
Payer: COMMERCIAL

## 2024-08-20 PROCEDURE — RXMED WILLOW AMBULATORY MEDICATION CHARGE

## 2024-08-27 DIAGNOSIS — E11.65 POORLY CONTROLLED TYPE 2 DIABETES MELLITUS (MULTI): Primary | ICD-10-CM

## 2024-08-29 DIAGNOSIS — E11.65 POORLY CONTROLLED TYPE 2 DIABETES MELLITUS (MULTI): ICD-10-CM

## 2024-08-29 RX ORDER — FLASH GLUCOSE SENSOR
KIT MISCELLANEOUS
Qty: 6 EACH | Refills: 0 | Status: SHIPPED | OUTPATIENT
Start: 2024-08-29

## 2024-09-03 ENCOUNTER — APPOINTMENT (OUTPATIENT)
Dept: PHARMACY | Facility: HOSPITAL | Age: 54
End: 2024-09-03
Payer: COMMERCIAL

## 2024-09-08 DIAGNOSIS — G43.809 OTHER MIGRAINE WITHOUT STATUS MIGRAINOSUS, NOT INTRACTABLE: ICD-10-CM

## 2024-09-09 PROCEDURE — RXMED WILLOW AMBULATORY MEDICATION CHARGE

## 2024-09-09 RX ORDER — TOPIRAMATE 100 MG/1
300 CAPSULE, EXTENDED RELEASE ORAL DAILY
Qty: 270 CAPSULE | Refills: 1 | Status: SHIPPED | OUTPATIENT
Start: 2024-09-09 | End: 2025-09-09

## 2024-09-10 ENCOUNTER — PHARMACY VISIT (OUTPATIENT)
Dept: PHARMACY | Facility: CLINIC | Age: 54
End: 2024-09-10
Payer: COMMERCIAL

## 2024-09-12 ENCOUNTER — APPOINTMENT (OUTPATIENT)
Dept: NEUROLOGY | Facility: CLINIC | Age: 54
End: 2024-09-12
Payer: COMMERCIAL

## 2024-09-17 ENCOUNTER — PHARMACY VISIT (OUTPATIENT)
Dept: PHARMACY | Facility: CLINIC | Age: 54
End: 2024-09-17
Payer: COMMERCIAL

## 2024-09-17 PROCEDURE — RXMED WILLOW AMBULATORY MEDICATION CHARGE

## 2024-09-18 ENCOUNTER — APPOINTMENT (OUTPATIENT)
Dept: PHARMACY | Facility: HOSPITAL | Age: 54
End: 2024-09-18
Payer: COMMERCIAL

## 2024-09-18 DIAGNOSIS — E11.65 POORLY CONTROLLED TYPE 2 DIABETES MELLITUS (MULTI): ICD-10-CM

## 2024-09-18 RX ORDER — FLASH GLUCOSE SCANNING READER
EACH MISCELLANEOUS
Qty: 1 EACH | Refills: 0 | Status: SHIPPED | OUTPATIENT
Start: 2024-09-18

## 2024-09-18 NOTE — PROGRESS NOTES
[facility] ([facility])    Azucena Hagan is a 53 y.o. female referred to Clinical Pharmacy Team to complete a comprehensive medication review (CMR) with a pharmacist as part of the [employer] ([employer]) diabetes program.  Pharmacy team may also provide assistance in diabetes management per discussion with referring provider and/or endocrinology. Referring Provider: Ramesh Diaz MD    Does patient follow with Endocrinology: Yes  Endocrinology Provider Name:  Ramesh Diaz MD    Subjective   Allergies   Allergen Reactions    Nut - Unspecified Anaphylaxis     Pecans only    Pecan Nut Shortness of breath    Cephalexin Other    House Dust Unknown    Hydrocodone-Acetaminophen Hallucinations and Other     Hallucinations    Insulin Aspart Itching    Insulin Regular Unknown    Nickel Other     Nickel-skin irritation    Red Dye Unknown    Statins-Hmg-Coa Reductase Inhibitors Other     Muscle cramping      Tree And Shrub Pollen Swelling    Venom-Wasp Other       [facility]  [address] [facility] [address]  Phone: [phone] Fax: [phone]    [id number] - [place], [place] - [address] [address]  Phone: [phone] Fax: [phone]    [facility]  [address] [facility] [address]  Phone: [phone] Fax: [phone]    Diabetes  She presents for her follow-up diabetic visit. She has type 2 diabetes mellitus. Her disease course has been improving. Risk factors for coronary artery disease include diabetes mellitus, dyslipidemia and hypertension. She is currently taking insulin at bedtime. An ACE inhibitor/angiotensin II receptor blocker is not being taken. She does not see a podiatrist.Eye exam is not current.     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 Yes, occasional, has alarm set up on CGM .  Did patient treat episode of hypoglycemia appropriately? Yes, keeps candy available at bedside    Objective     BP Readings from Last 6 Encounters:   05/24/24 137/70   05/18/24 (!) 138/111   02/14/24 118/58   11/22/23 114/76   11/21/23 110/75   07/19/23 108/68      Daily Weight  05/18/24 : 54 kg (119 lb)  05/18/24 : 51.7 kg (114 lb)  02/14/24 : 51.7 kg (114 lb)  01/31/24 : 50.8 kg (112 lb)  12/12/23 : 50.8 kg (112 lb)  11/20/23 : 51.7 kg (114 lb)       LAB  Lab Results   Component Value Date    BILITOT 0.5 05/24/2024    CALCIUM 7.3 (L) 05/24/2024    CO2 15 (L) 05/24/2024     (H) 05/24/2024    CREATININE 0.68 05/24/2024    GLUCOSE 69 (L) 05/24/2024    ALKPHOS 40 05/24/2024    K 3.3 (L) 05/24/2024    PROT 4.5 (L) 05/24/2024     05/24/2024    AST 20 05/24/2024    ALT 23 05/24/2024    BUN 7 05/24/2024    ANIONGAP 11 05/24/2024    MG 1.71 05/22/2024    PHOS 1.3 (L) 06/17/2023    PHOS CANCELED 06/17/2023    ALBUMIN 2.4 (L) 05/24/2024    AMYLASE 13 (L) 05/19/2024    LIPASE 19 05/19/2024    GFRF >90 06/21/2023    GFRMALE CANCELED 06/19/2023     Lab Results   Component Value Date    TRIG 101 05/19/2024    CHOL 169 05/19/2024    LDLCALC 102 (H) 05/19/2024    HDL 46.7 05/19/2024     Lab Results   Component Value Date    HGBA1C 8.0 (H) 05/20/2024     Estimated body mass index is 21.76 kg/m² as calculated from the following:    Height as of 5/18/24: 1.575 m (5' 2.01\").    Weight as of 5/18/24: 54 kg (119 lb).     Current Outpatient Medications on File Prior to Visit   Medication Sig Dispense Refill    ALPRAZolam (Xanax) 1 mg tablet Take 1 tablet (1 mg) by mouth 3 times a day as needed for anxiety. 90 tablet 3    BD Ultra-Fine Patience Pen Needle 32 gauge x 5/32\" needle 4 times a day.      cyclobenzaprine (Flexeril) 10 mg tablet Take " 1 tablet (10 mg) by mouth 3 times a day as needed for muscle spasms. 30 tablet 2    EPINEPHrine (EpiPen 2-Dt) 0.3 mg/0.3 mL injection syringe Inject 0.3 mL (0.3 mg) into the muscle 1 time if needed for anaphylaxis for up to 2 doses. 2 each 1    famotidine (Pepcid) 10 mg tablet Take by mouth 2 times a day as needed.      flash glucose sensor kit (FreeStyle Alvarado 2 Sensor) kit For continuous glucose monitoring, change sensor every 14 days 6 each 0    FreeStyle Alvarado reader (FreeStyle Alvarado 2 [place]) misc Use as instructed. Scan sensor for glucose readings as directed 1 each 0    insulin glargine-yfgn 100 unit/mL (3 mL) Pen Inject 20 Units under the skin once daily at bedtime. 90 mL 0    insulin lispro (HumaLOG) 100 unit/mL injection Inject 0-0.15 mL (0-15 Units) under the skin 3 times daily (morning, midday, late afternoon). 0 units if glucose is less than 180, 4 units if glucose is 181-200, 6 units if glucose is 201-250, 8 units if glucose is 251-300, 10 units if glucose is 301-350, 12 units if glucose is 351-400 15 mL 0    L norgest/e.estradioL-e.estrad (Seasonique) 0.15 mg-30 mcg (84)/10 mcg (7) tablets,dose pack,3 month tablet TAKE 1 TABLET BY MOUTH ONCE DAILY. *NEED AUGUST APPT* 91 tablet 0    lancets (ONETOUCH DELICA SAFETY LANCET MISC) 3 times a day.      lansoprazole (Prevacid) 30 mg DR capsule TAKE 1 CAPSULE (30 MG) BY MOUTH 2 TIMES A DAY. 180 capsule 1    naloxone (Narcan) 4 mg/0.1 mL nasal spray Administer 1 spray (4 mg) into affected nostril(s). While pt lay on there back May repeat every 2-3 minutes if needed, alternating nostrils, until medical assistance becomes available.      ondansetron ODT (Zofran-ODT) 8 mg disintegrating tablet DISSOLVE 1 TABLET IN MOUTH EVERY 8 HOURS AS NEEDED 42 tablet 0    OneTouch Delica Plus Lancet 33 gauge misc       Synthroid 100 mcg tablet Take 1 tablet (100 mcg) by mouth once daily in the morning. Take before meals. 90 tablet 3    temazepam (Restoril) 30 mg capsule Take 1  capsule (30 mg) by mouth as needed at bedtime (insomnia). 30 capsule 3    Trokendi  mg capsule,extended release 24hr Take 300 mg ( 3 capsules) by mouth once daily. In addition to the  50mg cap for total of 350mg daily 270 capsule 1    Trokendi XR 50 mg capsule,extended release 24hr TAKE 1 CAPSULE(50MG) BY MOUTH ONCE DAILY WITH 300MG TROKENDI FOR TOTAL DAILY DOSE OF 350MG 90 capsule 1     No current facility-administered medications on file prior to visit.      MEDICATION RECONCILIATION  Added: none  Changed: none  Removed: none    Drug Interactions:  None warranting change in therapy at this time    CURRENT DIABETES PHARMACOTHERAPY  Insulin glargine - 20 units subcutaneously once daily (nighttime) Reports using 20-30 units depending on level and depending on carb load  Insulin lispro - inject per sliding scale, prescribed at hospital discharge 5/2024, has not filled since  Sliding scale: Inject 0-0.15 mL (0-15 Units) under the skin 3 times daily (morning, midday, late afternoon). 0 units if glucose is less than 180, 4 units if glucose is 181-200, 6 units if glucose is 201-250, 8 units if glucose is 251-300, 10 units if glucose is 301-350, 12 units if glucose is 351-400     HISTORICAL PHARMACOTHERAPY (if relevant)  Insulin aspart - adverse reactions documented  Insulin regular - adverse reactions documented    PREVENTATIVE PHARMACOTHERAPY  Statin? no  LDL: not at goal (< 70 mg/dL)  ACE-I/ARB? no  BP: not at goal, < 130/80  UACR:  unable to assess  Aspirin?  no    Assessment/Plan   Problem List Items Addressed This Visit       Poorly controlled type 2 diabetes mellitus (Multi)    Relevant Medications    FreeStyle Alvarado 2 [place] misc    Other Relevant Orders    Follow Up In Clinical Pharmacy      Diabetes:  Patient's Type 2 diabetes is uncontrolled but improved with most recent A1c of 8.0% on 5/20/24 (Goal < 7%). Reports doing fairly well on current regimen, but interested in starting once weekly injectable to  decrease need for daily insulin. Plans to discuss with endocrinologist at upcoming appt.  Comorbidity/Complication Regimen: needs adjustment, none prescribed. Pt indicated for statin therapy, needs routine lab monitoring for UACR to determine albuminuria status.   Concerns with DM regimen: Patient needs optimization of insulin/DM medications. Instructed patient to follow up with endocrinology as visit is overdue.     Comprehensive Medication Review:  Reviewed all medications on patient medication list in EMR. Discussed indication, administration, MOA and possible side effects to medications. Answered all patient questions and concerns.   Patient denies side effects with medications.   Adherence is expected to be Fair.   Additional concerns with medication list: none    [employer] Employee Diabetes Program Enrollment: Renewal  Patient enrolled in  Employee diabetes program for $0 co-pays on diabetes medications/supplies. Renewal enrollment should be active in 2-4 weeks.  Requested [employer] enrollment date: 9/18/24  PharmD Management Level: Level 1   Pharmacy fill location: [facility]      Follow up: 10-12 months for renewal      Vesta Bourgeois PharmD     Continue all meds under the continuation of care with the referring provider and clinical pharmacy team. Patient/Caregiver was informed they may decline to participate or withdraw from participation in pharmacy services at any time.

## 2024-09-19 PROCEDURE — RXMED WILLOW AMBULATORY MEDICATION CHARGE

## 2024-09-23 ENCOUNTER — PHARMACY VISIT (OUTPATIENT)
Dept: PHARMACY | Facility: CLINIC | Age: 54
End: 2024-09-23
Payer: COMMERCIAL

## 2024-10-08 ENCOUNTER — PHARMACY VISIT (OUTPATIENT)
Dept: PHARMACY | Facility: CLINIC | Age: 54
End: 2024-10-08
Payer: COMMERCIAL

## 2024-10-08 DIAGNOSIS — G89.29 CHRONIC BILATERAL LOW BACK PAIN WITH RIGHT-SIDED SCIATICA: ICD-10-CM

## 2024-10-08 DIAGNOSIS — M54.41 CHRONIC BILATERAL LOW BACK PAIN WITH RIGHT-SIDED SCIATICA: ICD-10-CM

## 2024-10-08 PROCEDURE — RXMED WILLOW AMBULATORY MEDICATION CHARGE

## 2024-10-08 RX ORDER — CYCLOBENZAPRINE HCL 10 MG
10 TABLET ORAL 3 TIMES DAILY PRN
Qty: 30 TABLET | Refills: 2 | Status: SHIPPED | OUTPATIENT
Start: 2024-10-08

## 2024-10-09 DIAGNOSIS — F41.9 ANXIETY: ICD-10-CM

## 2024-10-09 RX ORDER — TEMAZEPAM 30 MG/1
30 CAPSULE ORAL NIGHTLY PRN
Qty: 30 CAPSULE | Refills: 1 | Status: SHIPPED | OUTPATIENT
Start: 2024-10-15 | End: 2024-12-14

## 2024-10-09 RX ORDER — ALPRAZOLAM 1 MG/1
1 TABLET ORAL 3 TIMES DAILY PRN
Qty: 90 TABLET | Refills: 1 | Status: SHIPPED | OUTPATIENT
Start: 2024-10-15 | End: 2024-12-14

## 2024-10-14 ENCOUNTER — PHARMACY VISIT (OUTPATIENT)
Dept: PHARMACY | Facility: CLINIC | Age: 54
End: 2024-10-14

## 2024-10-14 ENCOUNTER — APPOINTMENT (OUTPATIENT)
Dept: BEHAVIORAL HEALTH | Facility: CLINIC | Age: 54
End: 2024-10-14
Payer: COMMERCIAL

## 2024-10-15 ENCOUNTER — PHARMACY VISIT (OUTPATIENT)
Dept: PHARMACY | Facility: CLINIC | Age: 54
End: 2024-10-15
Payer: COMMERCIAL

## 2024-10-15 PROCEDURE — RXMED WILLOW AMBULATORY MEDICATION CHARGE

## 2024-10-29 ENCOUNTER — APPOINTMENT (OUTPATIENT)
Dept: RHEUMATOLOGY | Facility: CLINIC | Age: 54
End: 2024-10-29
Payer: COMMERCIAL

## 2024-11-12 ENCOUNTER — PHARMACY VISIT (OUTPATIENT)
Dept: PHARMACY | Facility: CLINIC | Age: 54
End: 2024-11-12
Payer: COMMERCIAL

## 2024-11-12 PROCEDURE — RXMED WILLOW AMBULATORY MEDICATION CHARGE

## 2024-12-04 ENCOUNTER — APPOINTMENT (OUTPATIENT)
Dept: BEHAVIORAL HEALTH | Facility: CLINIC | Age: 54
End: 2024-12-04
Payer: COMMERCIAL

## 2024-12-04 DIAGNOSIS — F41.9 ANXIETY: ICD-10-CM

## 2024-12-04 PROCEDURE — 99215 OFFICE O/P EST HI 40 MIN: CPT | Performed by: STUDENT IN AN ORGANIZED HEALTH CARE EDUCATION/TRAINING PROGRAM

## 2024-12-04 PROCEDURE — 3049F LDL-C 100-129 MG/DL: CPT | Performed by: STUDENT IN AN ORGANIZED HEALTH CARE EDUCATION/TRAINING PROGRAM

## 2024-12-04 PROCEDURE — 3052F HG A1C>EQUAL 8.0%<EQUAL 9.0%: CPT | Performed by: STUDENT IN AN ORGANIZED HEALTH CARE EDUCATION/TRAINING PROGRAM

## 2024-12-04 RX ORDER — ALPRAZOLAM 1 MG/1
1 TABLET ORAL 3 TIMES DAILY PRN
Qty: 90 TABLET | Refills: 2 | Status: SHIPPED | OUTPATIENT
Start: 2024-12-04 | End: 2025-03-04

## 2024-12-04 RX ORDER — TEMAZEPAM 30 MG/1
30 CAPSULE ORAL NIGHTLY PRN
Qty: 30 CAPSULE | Refills: 2 | Status: SHIPPED | OUTPATIENT
Start: 2024-12-04 | End: 2025-03-04

## 2024-12-04 NOTE — PROGRESS NOTES
"Outpatient Psychiatry Follow-Up    Virtual or Telephone Consent  An interactive audio and video telecommunication system which permits real time communications between the patient (at the originating site) and provider (at the distant site) was utilized to provide this telehealth service.   Verbal consent was requested and obtained from Azucena Hagan on this date, 12/05/24 for a telehealth visit.     Azucena Hagan is a 54 y.o. female former patient of Dr. Tavarez, here to bridge prior to a full appointment to establish care. Besides unspecified anxiety, she has a recent hospitalization for demand ischemia 2/2 myocarditis vs Takosubo c/b PNA, post-COVID syndrome, hypothyroidism, poorly controlled IDDM-type II, GERD, chronic back pain     Subjective   Previous Treatment Plan          ICD-10-CM     Anxiety F41.9       Continue alprazolam 1 mg 3 times daily and temazepam 30 mg at night.  30 days and 3 refills of each follow-up 3 months           Relevant Medications     ALPRAZolam (Xanax) 1 mg tablet     temazepam (Restoril) 30 mg capsule     Interim History    She's feeling stressed because she and her  were going to build a house and supposed to move in a few weeks ago but they still haven't moved in. This is hard because she's very immobile and stuck on her second story (the new house is only one story). She's stuck in her house. Can't move to the first floor right now.    HPI of anxiety: Panic attacks will come on for no clear reason or triggered by family. With Dr. Bhakta, she gave up caffiene. She takes Xanax PRN if feeling panic coming on or knowing that a stressful situation is coming up. Usually goes though the whole supply in a month although day to day use varies.    PPH per 2020 note:  \"Past psychiatric medication trials: Wellbutrin (\"terrible\"), Strattera (paranoia), Paxil (insomnia), Zoloft (apathetic), Ambien (parasomnias), Lexapro, Prozac, Effexor, Cymbalta, amitriptyline, valium up to 20mg " "(ineffective).\"  Previously had ADHD and was on medication for that but decided with Dr. Tavarez to come off of it, also she has dyslexia and feels judged sometimes.  Therapy: She knows about exposure therapy but prefers avoiding.  Insomnia: depends on how much stress she has - neighbors playing loud music will sometimes wake her up     Objective   Mental Status Exam:  General Appearance: Well groomed, appropriate eye contact  Attitude/Behavior: Cooperative  Motor: No psychomotor agitation or retardation, no tremor or other abnormal movements  Speech: Normal rate, volume, prosody  Mood: fine  Affect: Euthymic, full-range  Thought Process: Linear, goal directed  Thought Associations: No loosening of associations  Thought Content: Normal  Perception: No perceptual abnormalities noted  Sensorium: Alert  Insight: Intact  Judgement: Intact  Cognition: Cognitively intact to conversational testing with respect to attention, orientation, fund of knowledge, recent and remote memory, and language    OARRS:  Alprazolam, Temazepam 30d 11/12  No data recorded  I have personally reviewed the OARRS report for Azucena Hagan. I have considered the risks of abuse, dependence, addiction and diversion  Is the patient prescribed a combination of a benzodiazepine and opioid?  No  Last Urine Drug Screen / ordered today: Yes  No results found for this or any previous visit (from the past 8760 hours).  N/A    Lab Review:   Lab Results   Component Value Date     05/24/2024    K 3.3 (L) 05/24/2024     (H) 05/24/2024    CO2 15 (L) 05/24/2024    BUN 7 05/24/2024    CREATININE 0.68 05/24/2024    GLUCOSE 69 (L) 05/24/2024    CALCIUM 7.3 (L) 05/24/2024     Lab Results   Component Value Date    CKTOTAL 33 06/19/2023     Lab Results   Component Value Date    WBC 11.8 (H) 05/24/2024    HGB 11.1 (L) 05/24/2024    HCT 34.3 (L) 05/24/2024    MCV 92 05/24/2024     05/24/2024     Lab Results   Component Value Date    CHOL 169 " 05/19/2024    TRIG 101 05/19/2024    HDL 46.7 05/19/2024          Assessment/Plan   Problem List Items Addressed This Visit             ICD-10-CM    Anxiety F41.9     Moderately increase anxiety related to social stressors, but overall stable. Avoidant, not interested in therapy right now. Will provide refills and schedule follow up with Dr. Bhakta, who she previously saw for an extended time.  - Xanax 1 mg TID PRN anxiety  - Temazepam 30 mg nightly         Relevant Medications    ALPRAZolam (Xanax) 1 mg tablet    temazepam (Restoril) 30 mg capsule    Other Relevant Orders    Follow Up In Psychiatry          Next appointment with Dr. Bhakta in 3 months.    Suicide Risk Assessment  There are no new risk factors for suicide and imminent risk remains low; therefore, Azucena Hagan does not require further risk mitigation.    I provided the mobile crisis number and encouraged calling this, 988 or 911 in case of a mental health crisis, including feeling suicidal or losing touch with reality.    Hu Sorenson MD

## 2024-12-06 NOTE — PATIENT INSTRUCTIONS
Follow-up is being scheduled with Dr. Bhakta.    Until then, please send me a message in [employer] if things aren't going as expected or you are unsure about something we discussed. If this isn't working, you can call the psychiatry department line at [phone], or leave me a voicemail at [phone].  If you are having thoughts of harming yourself or ending your life, please call the national suicide hotline 24/7 at 102. For this and other mental health emergencies, such as losing touch with reality, call the [facility] 24/7 mobile crisis hotline at 110-490-0728, or dial 911 for emergency services.

## 2024-12-06 NOTE — ASSESSMENT & PLAN NOTE
Moderately increase anxiety related to social stressors, but overall stable. Avoidant, not interested in therapy right now. Will provide refills and schedule follow up with Dr. Bhakta, who she previously saw for an extended time.  - Xanax 1 mg TID PRN anxiety  - Temazepam 30 mg nightly

## 2024-12-06 NOTE — ASSESSMENT & PLAN NOTE
>>ASSESSMENT AND PLAN FOR ANXIETY WRITTEN ON 12/5/2024  9:24 PM BY DEMETRIUS GUEVARA MD    Moderately increase anxiety related to social stressors, but overall stable. Avoidant, not interested in therapy right now. Will provide refills and schedule follow up with Dr. Bhakta, who she previously saw for an extended time.  - Xanax 1 mg TID PRN anxiety  - Temazepam 30 mg nightly

## 2024-12-10 ENCOUNTER — PHARMACY VISIT (OUTPATIENT)
Dept: PHARMACY | Facility: CLINIC | Age: 54
End: 2024-12-10
Payer: COMMERCIAL

## 2024-12-10 PROCEDURE — RXMED WILLOW AMBULATORY MEDICATION CHARGE

## 2024-12-16 ENCOUNTER — APPOINTMENT (OUTPATIENT)
Dept: ENDOCRINOLOGY | Facility: CLINIC | Age: 54
End: 2024-12-16
Payer: COMMERCIAL

## 2024-12-30 ENCOUNTER — PHARMACY VISIT (OUTPATIENT)
Dept: PHARMACY | Facility: CLINIC | Age: 54
End: 2024-12-30
Payer: COMMERCIAL

## 2024-12-30 PROCEDURE — RXMED WILLOW AMBULATORY MEDICATION CHARGE

## 2025-01-07 ENCOUNTER — PHARMACY VISIT (OUTPATIENT)
Dept: PHARMACY | Facility: CLINIC | Age: 55
End: 2025-01-07
Payer: COMMERCIAL

## 2025-01-07 PROCEDURE — RXMED WILLOW AMBULATORY MEDICATION CHARGE

## 2025-01-08 ENCOUNTER — APPOINTMENT (OUTPATIENT)
Dept: PRIMARY CARE | Facility: CLINIC | Age: 55
End: 2025-01-08
Payer: COMMERCIAL

## 2025-01-08 DIAGNOSIS — I10 BENIGN ESSENTIAL HYPERTENSION: ICD-10-CM

## 2025-01-08 DIAGNOSIS — Z79.4 TYPE 2 DIABETES MELLITUS WITH OTHER SPECIFIED COMPLICATION, WITH LONG-TERM CURRENT USE OF INSULIN: Primary | ICD-10-CM

## 2025-01-08 DIAGNOSIS — E11.69 TYPE 2 DIABETES MELLITUS WITH OTHER SPECIFIED COMPLICATION, WITH LONG-TERM CURRENT USE OF INSULIN: Primary | ICD-10-CM

## 2025-01-08 PROBLEM — I21.4 NSTEMI (NON-ST ELEVATED MYOCARDIAL INFARCTION) (MULTI): Status: RESOLVED | Noted: 2024-05-18 | Resolved: 2025-01-08

## 2025-01-08 PROCEDURE — 99214 OFFICE O/P EST MOD 30 MIN: CPT | Performed by: STUDENT IN AN ORGANIZED HEALTH CARE EDUCATION/TRAINING PROGRAM

## 2025-01-08 NOTE — PROGRESS NOTES
"Subjective   Patient ID: Azucena Hagan is a 54 y.o. female who presents for No chief complaint on file..  HPI   Ambulatory Dysfunction   Able to ambulate up her 2 steps in the new house  Temperature swings are making   Insulin not currently working for her   Weight gain of 20lbs     DM  Following with Endocrinology   Following with pharm  Currently not taking her insulin due to previous     POTS     Anxiety/ Hx of Panic attacks    Her psychiatrist passed away, she had been with him for the past 20 years  Did a bridge appointment with Dr. Sorenson  Currently without psychiatry   Moderately increase anxiety   - Xanax 1 mg TID PRN anxiety  - Temazepam 30 mg nightly       Current Outpatient Medications:     ALPRAZolam (Xanax) 1 mg tablet, Take 1 tablet (1 mg) by mouth 3 times a day as needed for anxiety., Disp: 90 tablet, Rfl: 2    BD Ultra-Fine Patience Pen Needle 32 gauge x 5/32\" needle, 4 times a day., Disp: , Rfl:     cyclobenzaprine (Flexeril) 10 mg tablet, Take 1 tablet (10 mg) by mouth 3 times a day as needed for muscle spasms., Disp: 30 tablet, Rfl: 2    EPINEPHrine (EpiPen 2-Td) 0.3 mg/0.3 mL injection syringe, Inject 0.3 mL (0.3 mg) into the muscle 1 time if needed for anaphylaxis for up to 2 doses., Disp: 2 each, Rfl: 1    famotidine (Pepcid) 10 mg tablet, Take by mouth 2 times a day as needed., Disp: , Rfl:     flash glucose scanning reader (FreeStyle Alvarado 2 [place]) misc, Use as instructed. Scan sensor for glucose readings as directed, Disp: 1 each, Rfl: 0    flash glucose sensor kit (FreeStyle Alvarado 2 Sensor) kit, For continuous glucose monitoring, change sensor every 14 days, Disp: 6 each, Rfl: 0    FreeStyle Alvarado 2 [place] misc, Use as instructed, Disp: 1 each, Rfl: 0    insulin glargine-yfgn 100 unit/mL (3 mL) Pen, Inject 20 Units under the skin once daily at bedtime., Disp: 90 mL, Rfl: 0    insulin lispro (HumaLOG) 100 unit/mL injection, Inject 0-0.15 mL (0-15 Units) under the skin 3 times daily " (morning, midday, late afternoon). 0 units if glucose is less than 180, 4 units if glucose is 181-200, 6 units if glucose is 201-250, 8 units if glucose is 251-300, 10 units if glucose is 301-350, 12 units if glucose is 351-400, Disp: 15 mL, Rfl: 0    L norgest/e.estradioL-e.estrad (Seasonique) 0.15 mg-30 mcg (84)/10 mcg (7) tablets,dose pack,3 month tablet, TAKE 1 TABLET BY MOUTH ONCE DAILY. *NEED AUGUST APPT*, Disp: 91 tablet, Rfl: 0    lancets (ONETOUCH DELICA SAFETY LANCET MISC), 3 times a day., Disp: , Rfl:     lansoprazole (Prevacid) 30 mg DR capsule, TAKE 1 CAPSULE (30 MG) BY MOUTH 2 TIMES A DAY., Disp: 180 capsule, Rfl: 1    naloxone (Narcan) 4 mg/0.1 mL nasal spray, Administer 1 spray (4 mg) into affected nostril(s). While pt lay on there back May repeat every 2-3 minutes if needed, alternating nostrils, until medical assistance becomes available., Disp: , Rfl:     OneTouch Delica Plus Lancet 33 gauge misc, , Disp: , Rfl:     Synthroid 100 mcg tablet, Take 1 tablet (100 mcg) by mouth once daily in the morning. Take before meals., Disp: 90 tablet, Rfl: 3    temazepam (Restoril) 30 mg capsule, Take 1 capsule (30 mg) by mouth as needed at bedtime (insomnia)., Disp: 30 capsule, Rfl: 2    Trokendi  mg capsule,extended release 24hr, Take 300 mg ( 3 capsules) by mouth once daily. In addition to the  50mg cap for total of 350mg daily, Disp: 270 capsule, Rfl: 1    Trokendi XR 50 mg capsule,extended release 24hr, TAKE 1 CAPSULE(50MG) BY MOUTH ONCE DAILY WITH 300MG TROKENDI FOR TOTAL DAILY DOSE OF 350MG, Disp: 90 capsule, Rfl: 1    Visit Vitals  Smoking Status Never         Assessment/Plan     PMHx: HTN, DSL (statin causes cramping), DJD (numbness of the right leg, was following with Dr. Stokes last visit 10/22, previous steroid injections), DM (A1C 9.5%), Lost significant about of weight using keto (>100) and recent illness, Hypothyroid ( TSH 4.7), Long COVID- SOB (improving over time), PCOS (managing with  OCPs),     Ambulatory Dysfunction   Able to ambulate up her 2 steps in the new house  Temperature swings are making   Insulin not currently working for her   Weight gain of 20lbs     DM  Following with Endocrinology   Following with pharm  Currently not taking her insulin due to previous       Anxiety/ Hx of Panic attacks    Her psychiatrist passed away, she had been with him for the past 20 years  Did a bridge appointment with Dr. Sorenson  Currently without psychiatry   Moderately increase anxiety   - Xanax 1 mg TID PRN anxiety  - Temazepam 30 mg nightly    HTN  Physical exam was stable. Discussed maintaining diets such as DASH to help maintain normal Blood pressure. Encouraged regular exercise for a total of 120 min weekly. We will fu labs in 1-3 days. For now there will be no change in medical management. All questions and concerns were addressed. Pt will follow up in 4-6 months or sooner if needed.       Lumbar radiculopathy  MRI-disc bulging L4/S1 with moderate to severe diffuse arthritis  Following with pain management  Referral to Occupational Therapy and physical therapy      Migraines  Refill Trokendi   Will obtain records for previous medications and efficacy for prior auth  Answers submitted by the patient for this visit:  Diabetes Questionnaire (Submitted on 1/8/2025)  Chief Complaint: Diabetes problem  Diabetes type: type 2  MedicAlert ID: No    Hx of NSTEMI   Type 2 MI 2/2 to dehydration    Nut allergy     Health Maintenance   Topic Date Due    Yearly Adult Physical  Never done    Diabetes: Urine Protein Screening  Never done    HIV Screening  Never done    Colorectal Cancer Screening  Never done    MMR Vaccines (1 of 1 - Standard series) Never done    Diabetes: Retinopathy Screening  Never done    Hepatitis C Screening  Never done    Pneumococcal Vaccine (1 of 2 - PCV) Never done    DTaP/Tdap/Td Vaccines (1 - Tdap) Never done    Hepatitis B Vaccines (3 of 3 - 19+ 3-dose series) 01/09/2000    Zoster  Vaccines (1 of 2) Never done    Mammogram  12/06/2022    TSH Level  06/17/2024    Diabetes: Hemoglobin A1C  08/20/2024    Influenza Vaccine (1) Never done    COVID-19 Vaccine (2 - 2024-25 season) 09/01/2024    Cervical Cancer Screening  10/28/2024    Lipid Panel  05/19/2025    Creatinine Level  05/24/2025    Potassium Level  05/24/2025    Echocardiogram  05/24/2025    HIB Vaccines  Aged Out    IPV Vaccines  Aged Out    Hepatitis A Vaccines  Aged Out    Meningococcal Vaccine  Aged Out    Rotavirus Vaccines  Aged Out    HPV Vaccines  Aged Out            Kaci Saha DO 01/08/25 11:02 AM

## 2025-01-09 PROCEDURE — RXMED WILLOW AMBULATORY MEDICATION CHARGE

## 2025-01-10 ENCOUNTER — PHARMACY VISIT (OUTPATIENT)
Dept: PHARMACY | Facility: CLINIC | Age: 55
End: 2025-01-10
Payer: COMMERCIAL

## 2025-01-13 ENCOUNTER — APPOINTMENT (OUTPATIENT)
Dept: PHARMACY | Facility: HOSPITAL | Age: 55
End: 2025-01-13
Payer: COMMERCIAL

## 2025-01-13 DIAGNOSIS — Z79.4 TYPE 2 DIABETES MELLITUS WITH OTHER SPECIFIED COMPLICATION, WITH LONG-TERM CURRENT USE OF INSULIN: Primary | ICD-10-CM

## 2025-01-13 DIAGNOSIS — E11.69 TYPE 2 DIABETES MELLITUS WITH OTHER SPECIFIED COMPLICATION, WITH LONG-TERM CURRENT USE OF INSULIN: Primary | ICD-10-CM

## 2025-01-13 NOTE — PROGRESS NOTES
[facility] ([facility])    Azucena Hagan is a 54 y.o. female presenting to Clinical Pharmacy Team for follow up as part of the [employer] ([employer]) diabetes program. Referring Provider: Kaci Saha, DO    Does patient follow with Endocrinology: Yes  Endocrinology Provider Name:  Ramesh Diaz MD    Subjective   Allergies   Allergen Reactions    Nut - Unspecified Anaphylaxis     Pecans only    Pecan Nut Shortness of breath    Cephalexin Other    House Dust Unknown    Hydrocodone-Acetaminophen Hallucinations and Other     Hallucinations    Insulin Aspart Itching    Insulin Regular Unknown    Nickel Other     Nickel-skin irritation    Red Dye Unknown    Statins-Hmg-Coa Reductase Inhibitors Other     Muscle cramping      Tree And Shrub Pollen Swelling    Venom-Wasp Other     [facility]  [address] [facility] [address]  Phone: [phone] Fax: [phone]    [id number] [place] - [address] [address]  Phone: [phone] Fax: [phone]    [facility]  [address] [facility] [address]  Phone: [phone] Fax: [phone]    Diabetes  She presents for her follow-up diabetic visit. She has type 2 diabetes mellitus. Her disease course has been improving. Risk factors for coronary artery disease include diabetes mellitus, dyslipidemia and hypertension. She is currently taking insulin at bedtime. An ACE inhibitor/angiotensin II receptor blocker is not being taken. She does not see a podiatrist.Eye exam is not current.     Interim hx:  At last visit, discussed addition of GLP-1 to improve glycemic control and decrease need for daily insulin; pt planned to discuss with endocrinology at next visit. Next endo visit scheduled 4/7/25. Recently saw PCP (1/8/25) where Ozempic 0.25 mg prescribed.     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 Yes, occasional, has alarm set up on CGM .  Did patient treat episode of hypoglycemia appropriately? Yes, keeps candy available at bedside    Objective     BP Readings from Last 6 Encounters:   05/24/24 137/70   05/18/24 (!) 138/111   02/14/24 118/58   11/22/23 114/76   11/21/23 110/75   07/19/23 108/68      Daily Weight  05/18/24 : 54 kg (119 lb)  05/18/24 : 51.7 kg (114 lb)  02/14/24 : 51.7 kg (114 lb)  01/31/24 : 50.8 kg (112 lb)  12/12/23 : 50.8 kg (112 lb)  11/20/23 : 51.7 kg (114 lb)     LAB  Lab Results   Component Value Date    BILITOT 0.5 05/24/2024    CALCIUM 7.3 (L) 05/24/2024    CO2 15 (L) 05/24/2024     (H) 05/24/2024    CREATININE 0.68 05/24/2024    GLUCOSE 69 (L) 05/24/2024    ALKPHOS 40 05/24/2024    K 3.3 (L) 05/24/2024    PROT 4.5 (L) 05/24/2024     05/24/2024    AST 20 05/24/2024    ALT 23 05/24/2024    BUN 7 05/24/2024    ANIONGAP 11 05/24/2024    MG 1.71 05/22/2024    PHOS 1.3 (L) 06/17/2023    PHOS CANCELED 06/17/2023    ALBUMIN 2.4 (L) 05/24/2024    AMYLASE 13 (L) 05/19/2024    LIPASE 19 05/19/2024    GFRF >90 06/21/2023    GFRMALE CANCELED 06/19/2023     Lab Results   Component Value Date    TRIG 101 05/19/2024    CHOL 169 05/19/2024    LDLCALC 102 (H) 05/19/2024    HDL 46.7 05/19/2024     Lab Results   Component Value Date    HGBA1C 8.0 (H) 05/20/2024     Estimated body mass index is 21.76 kg/m² as calculated from the following:    Height as of 5/18/24: 1.575 m (5' 2.01\").    Weight as of 5/18/24: 54 kg (119 lb).     Current Outpatient Medications on " "File Prior to Visit   Medication Sig Dispense Refill    ALPRAZolam (Xanax) 1 mg tablet Take 1 tablet (1 mg) by mouth 3 times a day as needed for anxiety. 90 tablet 2    BD Ultra-Fine Patience Pen Needle 32 gauge x 5/32\" needle 4 times a day.      cyclobenzaprine (Flexeril) 10 mg tablet Take 1 tablet (10 mg) by mouth 3 times a day as needed for muscle spasms. 30 tablet 2    EPINEPHrine (EpiPen 2-Td) 0.3 mg/0.3 mL injection syringe Inject 0.3 mL (0.3 mg) into the muscle 1 time if needed for anaphylaxis for up to 2 doses. 2 each 1    famotidine (Pepcid) 10 mg tablet Take by mouth 2 times a day as needed.      flash glucose scanning reader (FreeStyle Alvarado 2 [place]) misc Use as instructed. Scan sensor for glucose readings as directed 1 each 0    flash glucose sensor kit (FreeStyle Alvarado 2 Sensor) kit For continuous glucose monitoring, change sensor every 14 days 6 each 0    FreeStyle Al[place] 2 [place] misc Use as instructed 1 each 0    insulin glargine-yfgn 100 unit/mL (3 mL) Pen Inject 20 Units under the skin once daily at bedtime. 90 mL 0    insulin lispro (HumaLOG) 100 unit/mL injection Inject 0-0.15 mL (0-15 Units) under the skin 3 times daily (morning, midday, late afternoon). 0 units if glucose is less than 180, 4 units if glucose is 181-200, 6 units if glucose is 201-250, 8 units if glucose is 251-300, 10 units if glucose is 301-350, 12 units if glucose is 351-400 15 mL 0    lansoprazole (Prevacid) 30 mg DR capsule TAKE 1 CAPSULE (30 MG) BY MOUTH 2 TIMES A DAY. 180 capsule 1    semaglutide 0.25 mg or 0.5 mg (2 mg/3 mL) pen injector Inject 0.25 mg under the skin 1 (one) time per week. 3 mL 0    Synthroid 100 mcg tablet Take 1 tablet (100 mcg) by mouth once daily in the morning. Take before meals. 90 tablet 3    temazepam (Restoril) 30 mg capsule Take 1 capsule (30 mg) by mouth as needed at bedtime (insomnia). 30 capsule 2    Trokendi  mg capsule,extended release 24hr Take 300 mg ( 3 capsules) by mouth once daily. " In addition to the  50mg cap for total of 350mg daily 270 capsule 1    Trokendi XR 50 mg capsule,extended release 24hr TAKE 1 CAPSULE(50MG) BY MOUTH ONCE DAILY WITH 300MG TROKENDI FOR TOTAL DAILY DOSE OF 350MG 90 capsule 1    [DISCONTINUED] L norgest/e.estradioL-e.estrad (Seasonique) 0.15 mg-30 mcg (84)/10 mcg (7) tablets,dose pack,3 month tablet TAKE 1 TABLET BY MOUTH ONCE DAILY. *NEED AUGUST APPT* 91 tablet 0    [DISCONTINUED] lancets (ONETOUCH DELICA SAFETY LANCET MISC) 3 times a day.      [DISCONTINUED] naloxone (Narcan) 4 mg/0.1 mL nasal spray Administer 1 spray (4 mg) into affected nostril(s). While pt lay on there back May repeat every 2-3 minutes if needed, alternating nostrils, until medical assistance becomes available.      [DISCONTINUED] OneTouch Delica Plus Lancet 33 gauge misc        No current facility-administered medications on file prior to visit.      Drug Interactions:  None warranting change in therapy at this time    CURRENT DIABETES PHARMACOTHERAPY  Insulin glargine - 20 units subcutaneously once daily (nighttime) Recently has not been injecting as much as not eating d/t stress  Ozempic 0.25 mg injected subcutaneously once weekly - newly prescribed, planning to start    HISTORICAL PHARMACOTHERAPY (if relevant)  Insulin aspart - adverse reactions documented  Insulin regular - adverse reactions documented  Insulin lispro - prescribed at hospital discharge 5/2024, not renewed    PREVENTATIVE PHARMACOTHERAPY  Statin? no  LDL: not at goal (< 70 mg/dL)  ACE-I/ARB? no  BP: not at goal, < 130/80  UACR:  unable to assess  Aspirin?  no    [employer] Employee Diabetes Program Enrollment: Up to date  Patient enrolled in  Employee diabetes program for $0 co-pays on diabetes medications/supplies. Renewal enrollment should be active in 2-4 weeks.  Requested [employer] enrollment date: 9/18/24  PharmD Management Level: Level 1   Pharmacy fill location: [facility]    Assessment/Plan   Problem List Items  Addressed This Visit    None  Visit Diagnoses       Type 2 diabetes mellitus with other specified complication, with long-term current use of insulin    -  Primary    Relevant Orders    Referral to Clinical Pharmacy           Diabetes:  Patient's Type 2 diabetes is uncontrolled but improved with most recent A1c of 8.0% on 5/20/24 (Goal < 7%). At last visit, discussed possibility of adding GLP-1 to current regimen w/ goal of decreasing daily insulin requirement. At today's visit, pt also reports weight gain of 15-20 lbs. Discussed initiation of Ozempic 0.25 mg w/ plan to titrate based on tolerability, weight loss and glycemic control. Plan to monitor closely to ensure pt does not lose too much weight. Pt to initiate Ozempic 0.25 mg this week w/ close follow up 3 weeks.  Start:  Ozempic 0.25 mg - injected subcutaneously once weekly. Anticipated start date: 1/13/25    Ozempic Education:   - Counseled patient on Ozempic MOA, expectations, side effects, duration of therapy, administration, and monitoring parameters.  - Provided detailed dosing and administration counseling to ensure proper technique.   - Reviewed Ozempic titration schedule, starting with 0.25 mg once weekly for 4 weeks, then continuing on 0.5 mg once weekly. Pt verbalized understanding.  - Counseled patient on the benefits of GLP-1ra, such as cardiovascular risk reduction, glycemic control, and weight loss potential.  - Reviewed storage requirements of Ozempic when not in use, and when to administer the medication if a dose is missed.  - Advised patient that they may experience improved satiety after meals and portion sizes of meals may be reduced as doses of Ozempic increase.  - Counseled patient to avoid foods that are fatty/oily as this may precipitate the nausea/GI upset that may occur with new start Ozempic.     Follow up: 3 weeks      Vesta Bourgeois, AfshinD     Continue all meds under the continuation of care with the referring provider and clinical  pharmacy team. Patient/Caregiver was informed they may decline to participate or withdraw from participation in pharmacy services at any time.

## 2025-01-21 ENCOUNTER — APPOINTMENT (OUTPATIENT)
Dept: RHEUMATOLOGY | Facility: CLINIC | Age: 55
End: 2025-01-21
Payer: COMMERCIAL

## 2025-02-03 ENCOUNTER — APPOINTMENT (OUTPATIENT)
Dept: PHARMACY | Facility: HOSPITAL | Age: 55
End: 2025-02-03
Payer: COMMERCIAL

## 2025-02-04 ENCOUNTER — PHARMACY VISIT (OUTPATIENT)
Dept: PHARMACY | Facility: CLINIC | Age: 55
End: 2025-02-04
Payer: COMMERCIAL

## 2025-02-04 PROCEDURE — RXMED WILLOW AMBULATORY MEDICATION CHARGE

## 2025-02-17 DIAGNOSIS — E11.69 TYPE 2 DIABETES MELLITUS WITH OTHER SPECIFIED COMPLICATION, WITH LONG-TERM CURRENT USE OF INSULIN: ICD-10-CM

## 2025-02-17 DIAGNOSIS — Z79.4 TYPE 2 DIABETES MELLITUS WITH OTHER SPECIFIED COMPLICATION, WITH LONG-TERM CURRENT USE OF INSULIN: ICD-10-CM

## 2025-02-18 DIAGNOSIS — E11.65 POORLY CONTROLLED TYPE 2 DIABETES MELLITUS (MULTI): ICD-10-CM

## 2025-02-18 PROCEDURE — RXMED WILLOW AMBULATORY MEDICATION CHARGE

## 2025-02-18 RX ORDER — SEMAGLUTIDE 0.68 MG/ML
0.5 INJECTION, SOLUTION SUBCUTANEOUS WEEKLY
Qty: 3 ML | Refills: 1 | Status: SHIPPED | OUTPATIENT
Start: 2025-02-18 | End: 2025-02-21 | Stop reason: DRUGHIGH

## 2025-02-19 ENCOUNTER — PHARMACY VISIT (OUTPATIENT)
Dept: PHARMACY | Facility: CLINIC | Age: 55
End: 2025-02-19
Payer: COMMERCIAL

## 2025-02-19 RX ORDER — FLASH GLUCOSE SENSOR
KIT MISCELLANEOUS
Qty: 6 EACH | Refills: 3 | Status: SHIPPED | OUTPATIENT
Start: 2025-02-19

## 2025-02-20 PROCEDURE — RXMED WILLOW AMBULATORY MEDICATION CHARGE

## 2025-02-21 ENCOUNTER — TELEMEDICINE (OUTPATIENT)
Dept: PHARMACY | Facility: HOSPITAL | Age: 55
End: 2025-02-21
Payer: COMMERCIAL

## 2025-02-21 DIAGNOSIS — E11.69 TYPE 2 DIABETES MELLITUS WITH OTHER SPECIFIED COMPLICATION, WITH LONG-TERM CURRENT USE OF INSULIN: Primary | ICD-10-CM

## 2025-02-21 DIAGNOSIS — Z79.4 TYPE 2 DIABETES MELLITUS WITH OTHER SPECIFIED COMPLICATION, WITH LONG-TERM CURRENT USE OF INSULIN: Primary | ICD-10-CM

## 2025-02-21 NOTE — Clinical Note
Pt continues to do well on Ozempic, will trial titration to 1 mg w/ close monitoring to prevent too drastic weight loss.

## 2025-02-21 NOTE — Clinical Note
Doing well on Ozempic, denies side effects. Will continue following closely for glycemic control and weight management.

## 2025-02-21 NOTE — PROGRESS NOTES
[facility] (VBID)    Azucena Hagan is a 54 y.o. female presenting to Clinical Pharmacy Team for follow up as part of the [employer] Based [employer] (VBID) diabetes program. Referring Provider: Kaci Saha, DO    Does patient follow with Endocrinology: Yes  Endocrinology Provider Name:  Ramesh Diaz MD  *Has been following PCP for Ozempic*    Subjective   Allergies   Allergen Reactions    Nut - Unspecified Anaphylaxis     Pecans only    Pecan Nut Shortness of breath    Cephalexin Other    House Dust Unknown    Hydrocodone-Acetaminophen Hallucinations and Other     Hallucinations    Insulin Aspart Itching    Insulin Regular Unknown    Nickel Other     Nickel-skin irritation    Red Dye Unknown    Statins-Hmg-Coa Reductase Inhibitors Other     Muscle cramping      Tree And Shrub Pollen Swelling    Venom-Wasp Other     [facility]  [address] [facility] [address]  Phone: [phone] Fax: [phone]    [id number] - [place], [place] - [address] [address]  Phone: [phone] Fax: [phone]    [facility]  [address] [facility] [address]  Phone: [phone] Fax: [phone]    Diabetes  She presents for her follow-up diabetic visit. She has type 2 diabetes mellitus. Her disease course has been improving. Risk factors for coronary artery disease include diabetes mellitus, dyslipidemia and hypertension. She is currently taking insulin at bedtime. An ACE inhibitor/angiotensin II receptor blocker is not being taken. She does not see a podiatrist.Eye exam is not current.     Pertinent PMH Review:  PMH of Pancreatitis: No  PMH of Retinopathy: No  PMH of Urinary Tract Infections: Yes  PMH of MTC: No (Hashimotos thyroiditis)    Lifestyle:  Reports increased stress lately  Father in ICU and intubated   Psychiatrist recently passed away, moved to new house, various other factors  Weight:  This visit:  "reports not monitoring weight at home   At last visit: Reports weight gain to ~130 lbs, went from size 2/4 to 8, clothes don't fit    HOME MONITORING  Monitoring Device: CGM, Freestyle Alvarado 2  Monitoring Frequency: continuous     Current home BG readings: reports BGs variable  Any episodes of hypoglycemia? Yes, occasional, has alarm set up on CGM .  Did patient treat episode of hypoglycemia appropriately? Yes, keeps candy available at bedside    Objective     BP Readings from Last 6 Encounters:   05/24/24 137/70   05/18/24 (!) 138/111   02/14/24 118/58   11/22/23 114/76   11/21/23 110/75   07/19/23 108/68      Daily Weight  05/18/24 : 54 kg (119 lb)  05/18/24 : 51.7 kg (114 lb)  02/14/24 : 51.7 kg (114 lb)  01/31/24 : 50.8 kg (112 lb)  12/12/23 : 50.8 kg (112 lb)  11/20/23 : 51.7 kg (114 lb)     LAB  Lab Results   Component Value Date    BILITOT 0.5 05/24/2024    CALCIUM 7.3 (L) 05/24/2024    CO2 15 (L) 05/24/2024     (H) 05/24/2024    CREATININE 0.68 05/24/2024    GLUCOSE 69 (L) 05/24/2024    ALKPHOS 40 05/24/2024    K 3.3 (L) 05/24/2024    PROT 4.5 (L) 05/24/2024     05/24/2024    AST 20 05/24/2024    ALT 23 05/24/2024    BUN 7 05/24/2024    ANIONGAP 11 05/24/2024    MG 1.71 05/22/2024    PHOS 1.3 (L) 06/17/2023    PHOS CANCELED 06/17/2023    ALBUMIN 2.4 (L) 05/24/2024    AMYLASE 13 (L) 05/19/2024    LIPASE 19 05/19/2024    GFRF >90 06/21/2023    GFRMALE CANCELED 06/19/2023     Lab Results   Component Value Date    TRIG 101 05/19/2024    CHOL 169 05/19/2024    LDLCALC 102 (H) 05/19/2024    HDL 46.7 05/19/2024     Lab Results   Component Value Date    HGBA1C 8.0 (H) 05/20/2024     Estimated body mass index is 21.76 kg/m² as calculated from the following:    Height as of 5/18/24: 1.575 m (5' 2.01\").    Weight as of 5/18/24: 54 kg (119 lb).     Current Outpatient Medications on File Prior to Visit   Medication Sig Dispense Refill    ALPRAZolam (Xanax) 1 mg tablet Take 1 tablet (1 mg) by mouth 3 times a " "day as needed for anxiety. 90 tablet 2    BD Ultra-Fine Patience Pen Needle 32 gauge x 5/32\" needle 4 times a day.      cyclobenzaprine (Flexeril) 10 mg tablet Take 1 tablet (10 mg) by mouth 3 times a day as needed for muscle spasms. 30 tablet 2    EPINEPHrine (EpiPen 2-Td) 0.3 mg/0.3 mL injection syringe Inject 0.3 mL (0.3 mg) into the muscle 1 time if needed for anaphylaxis for up to 2 doses. 2 each 1    famotidine (Pepcid) 10 mg tablet Take by mouth 2 times a day as needed.      flash glucose scanning reader (FreeStyle Alvarado 2 [place]) misc Use as instructed. Scan sensor for glucose readings as directed 1 each 0    FreeStyle Alvarado 2 [place] misc Use as instructed 1 each 0    FreeStyle Alvarado 2 Sensor kit For continuous glucose monitoring.  Change every 14 days. 6 each 3    insulin glargine-yfgn 100 unit/mL (3 mL) Pen Inject 20 Units under the skin once daily at bedtime. 90 mL 0    insulin lispro (HumaLOG) 100 unit/mL injection Inject 0-0.15 mL (0-15 Units) under the skin 3 times daily (morning, midday, late afternoon). 0 units if glucose is less than 180, 4 units if glucose is 181-200, 6 units if glucose is 201-250, 8 units if glucose is 251-300, 10 units if glucose is 301-350, 12 units if glucose is 351-400 15 mL 0    lansoprazole (Prevacid) 30 mg DR capsule TAKE 1 CAPSULE (30 MG) BY MOUTH 2 TIMES A DAY. 180 capsule 1    semaglutide (Ozempic) 0.25 mg or 0.5 mg (2 mg/3 mL) pen injector Inject 0.5 mg under the skin 1 (one) time per week. 3 mL 1    Synthroid 100 mcg tablet Take 1 tablet (100 mcg) by mouth once daily in the morning. Take before meals. 90 tablet 3    temazepam (Restoril) 30 mg capsule Take 1 capsule (30 mg) by mouth as needed at bedtime (insomnia). 30 capsule 2    Trokendi  mg capsule,extended release 24hr Take 300 mg ( 3 capsules) by mouth once daily. In addition to the  50mg cap for total of 350mg daily 270 capsule 1    Trokendi XR 50 mg capsule,extended release 24hr TAKE 1 CAPSULE(50MG) BY MOUTH " ONCE DAILY WITH 300MG TROKENDI FOR TOTAL DAILY DOSE OF 350MG 90 capsule 1    [DISCONTINUED] flash glucose sensor kit (FreeStyle Alvarado 2 Sensor) kit For continuous glucose monitoring, change sensor every 14 days 6 each 0    [DISCONTINUED] semaglutide 0.25 mg or 0.5 mg (2 mg/3 mL) pen injector Inject 0.25 mg under the skin 1 (one) time per week. 3 mL 0     No current facility-administered medications on file prior to visit.      Drug Interactions:  None warranting change in therapy at this time    CURRENT DIABETES PHARMACOTHERAPY  Insulin glargine - 20 units subcutaneously once daily (nighttime) Recently has not been injecting as much as not eating d/t stress  Ozempic 0.5 mg injected subcutaneously once weekly     HISTORICAL PHARMACOTHERAPY (if relevant)  Insulin aspart - adverse reactions documented  Insulin regular - adverse reactions documented  Insulin lispro - prescribed at hospital discharge 5/2024, not renewed    PREVENTATIVE PHARMACOTHERAPY  Statin? no  LDL: not at goal (< 70 mg/dL)  ACE-I/ARB? no  BP: not at goal, < 130/80  UACR:  unable to assess  Aspirin?  no    VBID Employee Diabetes Program Enrollment: Up to date  Patient enrolled in  Employee diabetes program for $0 co-pays on diabetes medications/supplies. Renewal enrollment should be active in 2-4 weeks.  Requested VBID enrollment date: 9/18/24  PharmD Management Level: Level 1   Pharmacy fill location: [facility]    Assessment/Plan   Problem List Items Addressed This Visit    None  Visit Diagnoses       Type 2 diabetes mellitus with other specified complication, with long-term current use of insulin    -  Primary    Relevant Orders    Referral to Clinical Pharmacy           Diabetes:  Patient's Type 2 diabetes is uncontrolled but improved with most recent A1c of 8.0% on 5/20/24 (Goal < 7%). At last visit, initiated Ozempic 0.25 mg for continued glycemic control, weight management and reduction of cardiovascular risk. At today's  visit, pt reports doing very well on Ozempic, able to increase to 0.5 mg dose w/ no reported side effects. Patient is not currently monitoring weight at home     Continue Ozempic 0.5 mg for 4 weeks.   Continue:  Ozempic 0.5 mg - injected subcutaneously once weekly  Increasing to 1 mg  Pt to contact w/ any concerns  Follow up:      Vesta Bourgeois, PharmD     Continue all meds under the continuation of care with the referring provider and clinical pharmacy team. Patient/Caregiver was informed they may decline to participate or withdraw from participation in pharmacy services at any time.

## 2025-02-27 ENCOUNTER — PHARMACY VISIT (OUTPATIENT)
Dept: PHARMACY | Facility: CLINIC | Age: 55
End: 2025-02-27
Payer: COMMERCIAL

## 2025-03-11 ENCOUNTER — APPOINTMENT (OUTPATIENT)
Dept: RHEUMATOLOGY | Facility: CLINIC | Age: 55
End: 2025-03-11
Payer: COMMERCIAL

## 2025-03-13 ENCOUNTER — TELEMEDICINE (OUTPATIENT)
Dept: BEHAVIORAL HEALTH | Facility: CLINIC | Age: 55
End: 2025-03-13
Payer: COMMERCIAL

## 2025-03-13 ENCOUNTER — PHARMACY VISIT (OUTPATIENT)
Dept: PHARMACY | Facility: CLINIC | Age: 55
End: 2025-03-13
Payer: COMMERCIAL

## 2025-03-13 DIAGNOSIS — F90.9 ATTENTION DEFICIT HYPERACTIVITY DISORDER (ADHD), UNSPECIFIED ADHD TYPE: ICD-10-CM

## 2025-03-13 DIAGNOSIS — F41.1 GENERALIZED ANXIETY DISORDER WITH PANIC ATTACKS: ICD-10-CM

## 2025-03-13 DIAGNOSIS — F41.0 GENERALIZED ANXIETY DISORDER WITH PANIC ATTACKS: ICD-10-CM

## 2025-03-13 PROBLEM — Z79.891 LONG TERM (CURRENT) USE OF OPIATE ANALGESIC: Status: RESOLVED | Noted: 2023-09-23 | Resolved: 2025-03-13

## 2025-03-13 PROCEDURE — RXMED WILLOW AMBULATORY MEDICATION CHARGE

## 2025-03-13 PROCEDURE — 99417 PROLNG OP E/M EACH 15 MIN: CPT | Performed by: STUDENT IN AN ORGANIZED HEALTH CARE EDUCATION/TRAINING PROGRAM

## 2025-03-13 PROCEDURE — 99215 OFFICE O/P EST HI 40 MIN: CPT | Performed by: STUDENT IN AN ORGANIZED HEALTH CARE EDUCATION/TRAINING PROGRAM

## 2025-03-13 PROCEDURE — 1036F TOBACCO NON-USER: CPT | Performed by: STUDENT IN AN ORGANIZED HEALTH CARE EDUCATION/TRAINING PROGRAM

## 2025-03-13 RX ORDER — ALPRAZOLAM 1 MG/1
1 TABLET ORAL 3 TIMES DAILY PRN
Qty: 90 TABLET | Refills: 2 | Status: SHIPPED | OUTPATIENT
Start: 2025-03-13 | End: 2025-06-11

## 2025-03-13 RX ORDER — TEMAZEPAM 30 MG/1
30 CAPSULE ORAL NIGHTLY PRN
Qty: 30 CAPSULE | Refills: 2 | Status: SHIPPED | OUTPATIENT
Start: 2025-03-13 | End: 2025-06-11

## 2025-03-13 NOTE — ASSESSMENT & PLAN NOTE
Mostly related to health and susceptibility to illness since COVID. Most wants to reduce agoraphobia. Past trials include: Paxil (insomnia), Zoloft (apathetic), Ambien (parasomnias), Lexapro, Prozac, Effexor, Cymbalta, amitriptyline, valium up to 20mg (ineffective).  - agree with plan for equine therapy  - continue Xanax 1 mg TID PRN anxiety  - continue Temazepam 30 mg nightly

## 2025-03-13 NOTE — PROGRESS NOTES
Outpatient Psychiatry Follow-Up    Virtual or Telephone Consent  An interactive audio and video telecommunication system which permits real time communications between the patient (at the originating site) and provider (at the distant site) was utilized to provide this telehealth service.   Verbal consent was requested and obtained from Azucena Hagan on this date, 25 for a telehealth visit and the patient's location was confirmed at the time of the visit.    Azucena Hagan is a 54 y.o. female presenting for psychiatric follow-up.     Subjective   Previous Treatment Plan         ICD-10-CM    Anxiety F41.9     Moderately increase anxiety related to social stressors, but overall stable. Avoidant, not interested in therapy right now. Will provide refills and schedule follow up with Dr. Bhakta, who she previously saw for an extended time.  - Xanax 1 mg TID PRN anxiety  - Temazepam 30 mg nightly         Relevant Medications    ALPRAZolam (Xanax) 1 mg tablet    temazepam (Restoril) 30 mg capsule    Other Relevant Orders    Follow Up In Psychiatry     Interim History  Dr. Bhakta actually does not have an outpatient clinic anymore so the patient is continuing with me.  Diabetes control improving - does not monitor weight because it contributes to stress.    Ran out of meds? No, she has one pill remaining.  Her father  3 weeks ago, which is still painful. She went to see him in the ICU wearing two N-95s, very anxious, he couldn't speak and was intubated. Not close to her mother, has some resentment over childhood mistreatment.  Moved into new house: Snow and her father's passing put a damper on this and they aren't all the way moved in yet.   Mobility: Much better, she can use a handrailing to get around the house.  Pain: Persistent, DDD in her family. Currently just on OTC pain meds.  Anxiety post-pandemic: Notes that her anxiety about health wasn't until getting COVID. Her anxiety is too high to drive.    Most  wants to be able to get a good night's sleep, not feel so panicked in crowds (didn't used to feel that way)  PT didn't do much for her and she's looking into alternatives. She plans on getting back into horseback riding. Going to a therapeutic place in [place] to work on mobility and anxiety.       Objective   Mental Status Exam:  General Appearance: Well groomed, appropriate eye contact  Attitude/Behavior: Cooperative  Motor: No psychomotor agitation or retardation, no tremor or other abnormal movements  Speech: Normal rate, volume, prosody  Mood: sad  Affect: Sad/Tearful  Thought Process: Tangential  Thought Associations: No loosening of associations  Thought Content: Normal  Perception: No perceptual abnormalities noted  Sensorium: Alert  Insight: Intact  Judgement: Intact  Cognition: Cognitively intact to conversational testing with respect to attention, orientation, fund of knowledge, recent and remote memory, and language    OARRS:  Alprazolam, Tempazepam 30d 2/4  No data recorded  I have personally reviewed the OARRS report for Azucena Hagan. I have considered the risks of abuse, dependence, addiction and diversion  Is the patient prescribed a combination of a benzodiazepine and opioid?  No  Last Urine Drug Screen / ordered today: Yes  No results found for this or any previous visit (from the past 8760 hours).  N/A    Lab Review:   NA       Assessment/Plan   Problem List Items Addressed This Visit             ICD-10-CM    Generalized anxiety disorder with panic attacks F41.1, F41.0     Mostly related to health and susceptibility to illness since COVID. Most wants to reduce agoraphobia. Past trials include: Paxil (insomnia), Zoloft (apathetic), Ambien (parasomnias), Lexapro, Prozac, Effexor, Cymbalta, amitriptyline, valium up to 20mg (ineffective).  - agree with plan for equine therapy  - continue Xanax 1 mg TID PRN anxiety  - continue Temazepam 30 mg nightly         Relevant Medications    temazepam  (Restoril) 30 mg capsule    ALPRAZolam (Xanax) 1 mg tablet    Other Relevant Orders    Follow Up In Psychiatry    Opiate/Opioid/Benzo Prescription Compliance    Attention deficit hyperactivity disorder (ADHD) F90.9     Medications either contra-indicated or problematic. Currently uncontrolled and may be contributing to feeling overwhelmed, perhaps adding to fatigue.  - no changes for now pending therapy         Relevant Orders    Follow Up In Psychiatry        Next appointment with me in 2 month(s).    Suicide Risk Assessment  There are no new risk factors for suicide and imminent risk remains low; therefore, Azucena Hagan does not require further risk mitigation.    I provided the mobile crisis number and encouraged calling this, 988 or 911 in case of a mental health crisis, including feeling suicidal or losing touch with reality.    Hu Sorenson MD

## 2025-03-13 NOTE — PATIENT INSTRUCTIONS
Please send me a message in LiquidText if things aren't going as expected or you are unsure about something we discussed. If this isn't working, you can call the psychiatry department line at [phone], or leave me a voicemail at [phone].  If you are having thoughts of harming yourself or ending your life, please call the national suicide hotline 24/7 at 890. For this and other mental health emergencies, such as losing touch with reality, call the [facility] 24/7 mobile crisis hotline at 200-831-3160, or dial 911 for emergency services.

## 2025-03-13 NOTE — ASSESSMENT & PLAN NOTE
Medications either contra-indicated or problematic. Currently uncontrolled and may be contributing to feeling overwhelmed, perhaps adding to fatigue.  - no changes for now pending therapy

## 2025-03-20 ENCOUNTER — PHARMACY VISIT (OUTPATIENT)
Dept: PHARMACY | Facility: CLINIC | Age: 55
End: 2025-03-20
Payer: COMMERCIAL

## 2025-03-20 PROCEDURE — RXMED WILLOW AMBULATORY MEDICATION CHARGE

## 2025-03-24 ENCOUNTER — PHARMACY VISIT (OUTPATIENT)
Dept: PHARMACY | Facility: CLINIC | Age: 55
End: 2025-03-24
Payer: COMMERCIAL

## 2025-03-24 PROCEDURE — RXMED WILLOW AMBULATORY MEDICATION CHARGE

## 2025-04-03 ENCOUNTER — APPOINTMENT (OUTPATIENT)
Dept: PHARMACY | Facility: HOSPITAL | Age: 55
End: 2025-04-03
Payer: COMMERCIAL

## 2025-04-03 DIAGNOSIS — Z79.4 TYPE 2 DIABETES MELLITUS WITH OTHER SPECIFIED COMPLICATION, WITH LONG-TERM CURRENT USE OF INSULIN: Primary | ICD-10-CM

## 2025-04-03 DIAGNOSIS — E11.69 TYPE 2 DIABETES MELLITUS WITH OTHER SPECIFIED COMPLICATION, WITH LONG-TERM CURRENT USE OF INSULIN: Primary | ICD-10-CM

## 2025-04-03 NOTE — PROGRESS NOTES
[facility] (VBID)    Azucena Hagan is a 54 y.o. female presenting to Clinical Pharmacy Team for follow up as part of the [employer] Based [employer] Design (VBID) diabetes program. Referring Provider: Kaci Saha, DO    Does patient follow with Endocrinology: Yes  Endocrinology Provider Name:  Ramesh Diaz MD  *Has been following PCP for Ozempic*    Subjective   Allergies   Allergen Reactions    Nut - Unspecified Anaphylaxis     Pecans only    Pecan Nut Shortness of breath    Cephalexin Other    House Dust Unknown    Hydrocodone-Acetaminophen Hallucinations and Other     Hallucinations    Insulin Aspart Itching    Insulin Regular Unknown    Nickel Other     Nickel-skin irritation    Red Dye Unknown    Statins-Hmg-Coa Reductase Inhibitors Other     Muscle cramping      Tree And Shrub Pollen Swelling    Venom-Wasp Other     Diabetes  She presents for her follow-up diabetic visit. She has type 2 diabetes mellitus. Her disease course has been improving. Risk factors for coronary artery disease include diabetes mellitus, dyslipidemia and hypertension. She is currently taking insulin at bedtime. An ACE inhibitor/angiotensin II receptor blocker is not being taken. She does not see a podiatrist.Eye exam is not current.     Pertinent PMH Review:  PMH of Pancreatitis: No  PMH of Retinopathy: No  PMH of Urinary Tract Infections: Yes  PMH of MTC: No (Hashimotos thyroiditis)    Lifestyle:  Reports increased stress lately (multifactorial)  Weight:  This visit: reports stable, initially gained weight w/ stress eating but reports has decreased w/ increased Ozempic  At last visit: Not monitoring weight at home to avoid fixating on it    HOME MONITORING  Monitoring Device: CGM, Freestyle Alvarado 2  Monitoring Frequency: continuous     Current home BG readings: reports CGM data have stabilized, overall TIR > 70%.   Highest witnessed: 150 mg/dL after stress eating  Previous readings:  "reports BGs variable, large spikes with meals, huge dips afterwards    Any episodes of hypoglycemia? No    Objective     BP Readings from Last 6 Encounters:   05/24/24 137/70   05/18/24 (!) 138/111   02/14/24 118/58   11/22/23 114/76   11/21/23 110/75   07/19/23 108/68      Daily Weight  05/18/24 : 54 kg (119 lb)  05/18/24 : 51.7 kg (114 lb)  02/14/24 : 51.7 kg (114 lb)  01/31/24 : 50.8 kg (112 lb)  12/12/23 : 50.8 kg (112 lb)  11/20/23 : 51.7 kg (114 lb)     LAB  Lab Results   Component Value Date    BILITOT 0.5 05/24/2024    CALCIUM 7.3 (L) 05/24/2024    CO2 15 (L) 05/24/2024     (H) 05/24/2024    CREATININE 0.68 05/24/2024    GLUCOSE 69 (L) 05/24/2024    ALKPHOS 40 05/24/2024    K 3.3 (L) 05/24/2024    PROT 4.5 (L) 05/24/2024     05/24/2024    AST 20 05/24/2024    ALT 23 05/24/2024    BUN 7 05/24/2024    ANIONGAP 11 05/24/2024    MG 1.71 05/22/2024    PHOS 1.3 (L) 06/17/2023    PHOS CANCELED 06/17/2023    ALBUMIN 2.4 (L) 05/24/2024    AMYLASE 13 (L) 05/19/2024    LIPASE 19 05/19/2024    GFRF >90 06/21/2023    GFRMALE CANCELED 06/19/2023     Lab Results   Component Value Date    TRIG 101 05/19/2024    CHOL 169 05/19/2024    LDLCALC 102 (H) 05/19/2024    HDL 46.7 05/19/2024     Lab Results   Component Value Date    HGBA1C 8.0 (H) 05/20/2024     Estimated body mass index is 21.76 kg/m² as calculated from the following:    Height as of 5/18/24: 1.575 m (5' 2.01\").    Weight as of 5/18/24: 54 kg (119 lb).     Current Outpatient Medications on File Prior to Visit   Medication Sig Dispense Refill    ALPRAZolam (Xanax) 1 mg tablet Take 1 tablet (1 mg) by mouth 3 times a day as needed for anxiety. 90 tablet 2    BD Ultra-Fine Patience Pen Needle 32 gauge x 5/32\" needle 4 times a day.      cyclobenzaprine (Flexeril) 10 mg tablet Take 1 tablet (10 mg) by mouth 3 times a day as needed for muscle spasms. 30 tablet 2    EPINEPHrine (EpiPen 2-Td) 0.3 mg/0.3 mL injection syringe Inject 0.3 mL (0.3 mg) into the muscle 1 " time if needed for anaphylaxis for up to 2 doses. 2 each 1    famotidine (Pepcid) 10 mg tablet Take by mouth 2 times a day as needed.      flash glucose scanning reader (FreeStyle Alvarado 2 [place]) misc Use as instructed. Scan sensor for glucose readings as directed 1 each 0    FreeStyle Alvarado 2 [place] misc Use as instructed 1 each 0    FreeStyle Alvarado 2 Sensor kit For continuous glucose monitoring.  Change every 14 days. 6 each 3    insulin glargine-yfgn 100 unit/mL (3 mL) Pen Inject 20 Units under the skin once daily at bedtime. 90 mL 0    insulin lispro (HumaLOG) 100 unit/mL injection Inject 0-0.15 mL (0-15 Units) under the skin 3 times daily (morning, midday, late afternoon). 0 units if glucose is less than 180, 4 units if glucose is 181-200, 6 units if glucose is 201-250, 8 units if glucose is 251-300, 10 units if glucose is 301-350, 12 units if glucose is 351-400 15 mL 0    lansoprazole (Prevacid) 30 mg DR capsule TAKE 1 CAPSULE (30 MG) BY MOUTH 2 TIMES A DAY. 180 capsule 1    semaglutide (OZEMPIC) 1 mg/dose (4 mg/3 mL) pen injector Inject 1 mg under the skin 1 (one) time per week. 3 mL 1    Synthroid 100 mcg tablet Take 1 tablet (100 mcg) by mouth once daily in the morning. Take before meals. 90 tablet 3    temazepam (Restoril) 30 mg capsule Take 1 capsule (30 mg) by mouth as needed at bedtime (insomnia). 30 capsule 2    Trokendi  mg capsule,extended release 24hr Take 300 mg ( 3 capsules) by mouth once daily. In addition to the  50mg cap for total of 350mg daily 270 capsule 1    Trokendi XR 50 mg capsule,extended release 24hr TAKE 1 CAPSULE(50MG) BY MOUTH ONCE DAILY WITH 300MG TROKENDI FOR TOTAL DAILY DOSE OF 350MG 90 capsule 1     No current facility-administered medications on file prior to visit.      Drug Interactions:  None warranting change in therapy at this time    CURRENT DIABETES PHARMACOTHERAPY  Insulin glargine - 20 units subcutaneously once daily (nighttime) Recently has not been injecting as  much as not eating d/t stress  Ozempic 0.75 mg injected subcutaneously once weekly (counting clicks)    HISTORICAL PHARMACOTHERAPY (if relevant)  Insulin aspart - adverse reactions documented  Insulin regular - adverse reactions documented  Insulin lispro - prescribed at hospital discharge 5/2024, not renewed    PREVENTATIVE PHARMACOTHERAPY  Statin? no  LDL: not at goal (< 70 mg/dL)  ACE-I/ARB? no  BP: not at goal, < 130/80  UACR:  unable to assess  Aspirin?  no    VBID Employee Diabetes Program Enrollment: Up to date  Patient enrolled in  Employee diabetes program for $0 co-pays on diabetes medications/supplies. Renewal enrollment should be active in 2-4 weeks.  Requested VBID enrollment date: 9/18/24  PharmD Management Level: Level 1   Pharmacy fill location: [facility]    Assessment/Plan   Problem List Items Addressed This Visit    None  Visit Diagnoses       Type 2 diabetes mellitus with other specified complication, with long-term current use of insulin              Diabetes:  Patient's Type 2 diabetes is uncontrolled but improved with most recent A1c of 8.0% on 5/20/24 (Goal < 7%).   Pt reports doing well on increased dose of Ozempic, able to increase to 0.75 mg once weekly by counting clicks, pt has not tried 1 mg dose yet due to hx of sensitivity to medication changes. Denies side effects, was feeling under the weather for a few days (upset stomach), but believe d/t acute viral illness, was mild and sx resolved. Pt to continue monitoring.  SMBGs reported to be improved, reports TIR > 70%, exact values unknown, but reports less variable BG throughout the day, denies hypoglycemia.   Change:  Increase to Ozempic 1 mg injected subcutaneously once weekly as directed, as tolerated    Follow up: 3 months      Vesta Bourgeois, AfshinD     Continue all meds under the continuation of care with the referring provider and clinical pharmacy team. Patient/Caregiver was informed they may decline to  participate or withdraw from participation in pharmacy services at any time.

## 2025-04-07 ENCOUNTER — APPOINTMENT (OUTPATIENT)
Dept: ENDOCRINOLOGY | Facility: CLINIC | Age: 55
End: 2025-04-07
Payer: COMMERCIAL

## 2025-04-08 DIAGNOSIS — Z79.4 TYPE 2 DIABETES MELLITUS WITH OTHER SPECIFIED COMPLICATION, WITH LONG-TERM CURRENT USE OF INSULIN: Primary | ICD-10-CM

## 2025-04-08 DIAGNOSIS — E11.69 TYPE 2 DIABETES MELLITUS WITH OTHER SPECIFIED COMPLICATION, WITH LONG-TERM CURRENT USE OF INSULIN: Primary | ICD-10-CM

## 2025-04-08 NOTE — PATIENT INSTRUCTIONS
Thank you for entrusting your care to the Clinical Pharmacy Team! We look forward to seeing you again soon.  Please call your clinical pharmacist with any questions or concerns.    You are enrolled in the [facility] Employee Value Based Insurance Design (VBID) program. This program allows you to receive for $0 co-pays on diabetes medications/supplies.  To maintain your eligibility for this program, you must:  Maintain  employee insurance coverage  Fill prescriptions at a  pharmacy for pick-up or home delivery  Complete a visit with a clinical pharmacist at least once annually    Vesta Bourgeois, PharmD  P: [phone]    To schedule an appointment, please contact:  P: [phone]

## 2025-04-10 PROCEDURE — RXMED WILLOW AMBULATORY MEDICATION CHARGE

## 2025-04-11 ENCOUNTER — PHARMACY VISIT (OUTPATIENT)
Dept: PHARMACY | Facility: CLINIC | Age: 55
End: 2025-04-11
Payer: COMMERCIAL

## 2025-04-17 ENCOUNTER — PATIENT MESSAGE (OUTPATIENT)
Dept: CARE COORDINATION | Facility: CLINIC | Age: 55
End: 2025-04-17
Payer: COMMERCIAL

## 2025-04-19 DIAGNOSIS — K21.9 GASTROESOPHAGEAL REFLUX DISEASE WITHOUT ESOPHAGITIS: ICD-10-CM

## 2025-04-19 DIAGNOSIS — G89.29 CHRONIC BILATERAL LOW BACK PAIN WITH RIGHT-SIDED SCIATICA: ICD-10-CM

## 2025-04-19 DIAGNOSIS — M54.41 CHRONIC BILATERAL LOW BACK PAIN WITH RIGHT-SIDED SCIATICA: ICD-10-CM

## 2025-04-19 LAB
ALBUMIN SERPL-MCNC: 4.2 G/DL (ref 3.6–5.1)
ALP SERPL-CCNC: 65 U/L (ref 37–153)
ALT SERPL-CCNC: 9 U/L (ref 6–29)
ANION GAP SERPL CALCULATED.4IONS-SCNC: 17 MMOL/L (CALC) (ref 7–17)
AST SERPL-CCNC: 13 U/L (ref 10–35)
BASOPHILS # BLD AUTO: 47 CELLS/UL (ref 0–200)
BASOPHILS NFR BLD AUTO: 0.6 %
BILIRUB SERPL-MCNC: 0.3 MG/DL (ref 0.2–1.2)
BUN SERPL-MCNC: 18 MG/DL (ref 7–25)
CALCIUM SERPL-MCNC: 9.1 MG/DL (ref 8.6–10.4)
CHLORIDE SERPL-SCNC: 102 MMOL/L (ref 98–110)
CO2 SERPL-SCNC: 14 MMOL/L (ref 20–32)
CREAT SERPL-MCNC: 1.21 MG/DL (ref 0.5–1.03)
EGFRCR SERPLBLD CKD-EPI 2021: 54 ML/MIN/1.73M2
EOSINOPHIL # BLD AUTO: 242 CELLS/UL (ref 15–500)
EOSINOPHIL NFR BLD AUTO: 3.1 %
ERYTHROCYTE [DISTWIDTH] IN BLOOD BY AUTOMATED COUNT: 13.7 % (ref 11–15)
EST. AVERAGE GLUCOSE BLD GHB EST-MCNC: 128 MG/DL
EST. AVERAGE GLUCOSE BLD GHB EST-SCNC: 7.1 MMOL/L
GLUCOSE SERPL-MCNC: 88 MG/DL (ref 65–99)
HBA1C MFR BLD: 6.1 %
HCT VFR BLD AUTO: 33.4 % (ref 35–45)
HGB BLD-MCNC: 10.8 G/DL (ref 11.7–15.5)
LYMPHOCYTES # BLD AUTO: 3128 CELLS/UL (ref 850–3900)
LYMPHOCYTES NFR BLD AUTO: 40.1 %
MCH RBC QN AUTO: 27.9 PG (ref 27–33)
MCHC RBC AUTO-ENTMCNC: 32.3 G/DL (ref 32–36)
MCV RBC AUTO: 86.3 FL (ref 80–100)
MONOCYTES # BLD AUTO: 312 CELLS/UL (ref 200–950)
MONOCYTES NFR BLD AUTO: 4 %
NEUTROPHILS # BLD AUTO: 4072 CELLS/UL (ref 1500–7800)
NEUTROPHILS NFR BLD AUTO: 52.2 %
PLATELET # BLD AUTO: 389 THOUSAND/UL (ref 140–400)
PMV BLD REES-ECKER: 9.2 FL (ref 7.5–12.5)
POTASSIUM SERPL-SCNC: 3.9 MMOL/L (ref 3.5–5.3)
PROT SERPL-MCNC: 7.1 G/DL (ref 6.1–8.1)
RBC # BLD AUTO: 3.87 MILLION/UL (ref 3.8–5.1)
SODIUM SERPL-SCNC: 133 MMOL/L (ref 135–146)
TSH SERPL-ACNC: 4.26 MIU/L
WBC # BLD AUTO: 7.8 THOUSAND/UL (ref 3.8–10.8)

## 2025-04-19 RX ORDER — CYCLOBENZAPRINE HCL 10 MG
10 TABLET ORAL 3 TIMES DAILY PRN
Qty: 30 TABLET | Refills: 2 | Status: CANCELLED | OUTPATIENT
Start: 2025-04-19

## 2025-04-20 DIAGNOSIS — D64.9 ANEMIA, UNSPECIFIED TYPE: Primary | ICD-10-CM

## 2025-04-20 LAB
1OH-MIDAZOLAM UR-MCNC: NEGATIVE NG/ML
7AMINOCLONAZEPAM UR-MCNC: NEGATIVE NG/ML
A-OH ALPRAZ UR-MCNC: 421 NG/ML
A-OH-TRIAZOLAM UR-MCNC: NEGATIVE NG/ML
AMPHETAMINES UR QL: NEGATIVE NG/ML
BARBITURATES UR QL: NEGATIVE NG/ML
BZE UR QL: NEGATIVE NG/ML
CODEINE UR-MCNC: NEGATIVE NG/ML
CREAT UR-MCNC: 39.4 MG/DL
DRUG SCREEN COMMENT UR-IMP: ABNORMAL
EDDP UR-MCNC: ABNORMAL NG/ML
FENTANYL UR-MCNC: NEGATIVE NG/ML
HYDROCODONE UR-MCNC: NEGATIVE NG/ML
HYDROMORPHONE UR-MCNC: NEGATIVE NG/ML
LORAZEPAM UR-MCNC: NEGATIVE NG/ML
METHADONE UR-MCNC: ABNORMAL UG/L
MORPHINE UR-MCNC: NEGATIVE NG/ML
NORDIAZEPAM UR-MCNC: NEGATIVE NG/ML
NORFENTANYL UR-MCNC: NEGATIVE NG/ML
NORHYDROCODONE UR CFM-MCNC: NEGATIVE NG/ML
NOROXYCODONE UR CFM-MCNC: NEGATIVE NG/ML
NORTRAMADOL UR-MCNC: ABNORMAL UG/ML
OH-ETHYLFLURAZ UR-MCNC: NEGATIVE NG/ML
OXAZEPAM UR-MCNC: 1420 NG/ML
OXIDANTS UR QL: NEGATIVE MCG/ML
OXYCODONE UR CFM-MCNC: NEGATIVE NG/ML
OXYMORPHONE UR CFM-MCNC: NEGATIVE NG/ML
PCP UR QL: NEGATIVE NG/ML
PH UR: 5.8 [PH] (ref 4.5–9)
QUEST 6 ACETYLMORPHINE: ABNORMAL
QUEST NOTES AND COMMENTS: ABNORMAL
QUEST PATIENT HISTORICAL REPORT: ABNORMAL
QUEST ZOLPIDEM: NEGATIVE NG/ML
TEMAZEPAM UR-MCNC: >2000 NG/ML
THC UR QL: NEGATIVE NG/ML
TRAMADOL UR-MCNC: ABNORMAL UG/ML
ZOLPIDEM PHENYL-4-CARB UR CFM-MCNC: NEGATIVE NG/ML

## 2025-04-21 DIAGNOSIS — G89.29 CHRONIC BILATERAL LOW BACK PAIN WITH RIGHT-SIDED SCIATICA: ICD-10-CM

## 2025-04-21 DIAGNOSIS — M54.41 CHRONIC BILATERAL LOW BACK PAIN WITH RIGHT-SIDED SCIATICA: ICD-10-CM

## 2025-04-21 PROCEDURE — RXMED WILLOW AMBULATORY MEDICATION CHARGE

## 2025-04-21 RX ORDER — CYCLOBENZAPRINE HCL 10 MG
10 TABLET ORAL 3 TIMES DAILY PRN
Qty: 30 TABLET | Refills: 2 | Status: SHIPPED | OUTPATIENT
Start: 2025-04-21

## 2025-04-21 RX ORDER — LANSOPRAZOLE 30 MG/1
30 CAPSULE, DELAYED RELEASE ORAL 2 TIMES DAILY
Qty: 180 CAPSULE | Refills: 1 | Status: SHIPPED | OUTPATIENT
Start: 2025-04-21 | End: 2026-04-21

## 2025-04-22 LAB
1OH-MIDAZOLAM UR-MCNC: NEGATIVE NG/ML
7AMINOCLONAZEPAM UR-MCNC: NEGATIVE NG/ML
A-OH ALPRAZ UR-MCNC: 421 NG/ML
A-OH-TRIAZOLAM UR-MCNC: NEGATIVE NG/ML
ALBUMIN SERPL-MCNC: 4.2 G/DL (ref 3.6–5.1)
ALP SERPL-CCNC: 65 U/L (ref 37–153)
ALT SERPL-CCNC: 9 U/L (ref 6–29)
AMPHETAMINES UR QL: NEGATIVE NG/ML
ANION GAP SERPL CALCULATED.4IONS-SCNC: 17 MMOL/L (CALC) (ref 7–17)
AST SERPL-CCNC: 13 U/L (ref 10–35)
BARBITURATES UR QL: NEGATIVE NG/ML
BASOPHILS # BLD AUTO: 47 CELLS/UL (ref 0–200)
BASOPHILS NFR BLD AUTO: 0.6 %
BILIRUB SERPL-MCNC: 0.3 MG/DL (ref 0.2–1.2)
BUN SERPL-MCNC: 18 MG/DL (ref 7–25)
BZE UR QL: NEGATIVE NG/ML
CALCIUM SERPL-MCNC: 9.1 MG/DL (ref 8.6–10.4)
CHLORIDE SERPL-SCNC: 102 MMOL/L (ref 98–110)
CO2 SERPL-SCNC: 14 MMOL/L (ref 20–32)
CODEINE UR-MCNC: NEGATIVE NG/ML
CREAT SERPL-MCNC: 1.21 MG/DL (ref 0.5–1.03)
CREAT UR-MCNC: 39.4 MG/DL
DRUG SCREEN COMMENT UR-IMP: ABNORMAL
EDDP UR-MCNC: NEGATIVE NG/ML
EGFRCR SERPLBLD CKD-EPI 2021: 54 ML/MIN/1.73M2
EOSINOPHIL # BLD AUTO: 242 CELLS/UL (ref 15–500)
EOSINOPHIL NFR BLD AUTO: 3.1 %
ERYTHROCYTE [DISTWIDTH] IN BLOOD BY AUTOMATED COUNT: 13.7 % (ref 11–15)
EST. AVERAGE GLUCOSE BLD GHB EST-MCNC: 128 MG/DL
EST. AVERAGE GLUCOSE BLD GHB EST-SCNC: 7.1 MMOL/L
FENTANYL UR-MCNC: NEGATIVE NG/ML
FERRITIN SERPL-MCNC: NORMAL NG/ML
GLUCOSE SERPL-MCNC: 88 MG/DL (ref 65–99)
HBA1C MFR BLD: 6.1 %
HCT VFR BLD AUTO: 33.4 % (ref 35–45)
HGB BLD-MCNC: 10.8 G/DL (ref 11.7–15.5)
HYDROCODONE UR-MCNC: NEGATIVE NG/ML
HYDROMORPHONE UR-MCNC: NEGATIVE NG/ML
IRON SERPL-MCNC: NORMAL MCG/DL
LORAZEPAM UR-MCNC: NEGATIVE NG/ML
LYMPHOCYTES # BLD AUTO: 3128 CELLS/UL (ref 850–3900)
LYMPHOCYTES NFR BLD AUTO: 40.1 %
MCH RBC QN AUTO: 27.9 PG (ref 27–33)
MCHC RBC AUTO-ENTMCNC: 32.3 G/DL (ref 32–36)
MCV RBC AUTO: 86.3 FL (ref 80–100)
METHADONE UR-MCNC: NEGATIVE NG/ML
MONOCYTES # BLD AUTO: 312 CELLS/UL (ref 200–950)
MONOCYTES NFR BLD AUTO: 4 %
MORPHINE UR-MCNC: NEGATIVE NG/ML
NEUTROPHILS # BLD AUTO: 4072 CELLS/UL (ref 1500–7800)
NEUTROPHILS NFR BLD AUTO: 52.2 %
NORDIAZEPAM UR-MCNC: NEGATIVE NG/ML
NORFENTANYL UR-MCNC: NEGATIVE NG/ML
NORHYDROCODONE UR CFM-MCNC: NEGATIVE NG/ML
NOROXYCODONE UR CFM-MCNC: NEGATIVE NG/ML
NORTRAMADOL UR-MCNC: NEGATIVE NG/ML
OH-ETHYLFLURAZ UR-MCNC: NEGATIVE NG/ML
OXAZEPAM UR-MCNC: 1420 NG/ML
OXIDANTS UR QL: NEGATIVE MCG/ML
OXYCODONE UR CFM-MCNC: NEGATIVE NG/ML
OXYMORPHONE UR CFM-MCNC: NEGATIVE NG/ML
PCP UR QL: NEGATIVE NG/ML
PH UR: 5.8 [PH] (ref 4.5–9)
PLATELET # BLD AUTO: 389 THOUSAND/UL (ref 140–400)
PMV BLD REES-ECKER: 9.2 FL (ref 7.5–12.5)
POTASSIUM SERPL-SCNC: 3.9 MMOL/L (ref 3.5–5.3)
PROT SERPL-MCNC: 7.1 G/DL (ref 6.1–8.1)
QUEST 6 ACETYLMORPHINE: NEGATIVE NG/ML
QUEST NOTES AND COMMENTS: ABNORMAL
QUEST ZOLPIDEM: NEGATIVE NG/ML
RBC # BLD AUTO: 3.87 MILLION/UL (ref 3.8–5.1)
SODIUM SERPL-SCNC: 133 MMOL/L (ref 135–146)
TEMAZEPAM UR-MCNC: >2000 NG/ML
THC UR QL: NEGATIVE NG/ML
TRAMADOL UR-MCNC: NEGATIVE NG/ML
TSH SERPL-ACNC: 4.26 MIU/L
WBC # BLD AUTO: 7.8 THOUSAND/UL (ref 3.8–10.8)
ZOLPIDEM PHENYL-4-CARB UR CFM-MCNC: NEGATIVE NG/ML

## 2025-05-02 ENCOUNTER — PHARMACY VISIT (OUTPATIENT)
Dept: PHARMACY | Facility: CLINIC | Age: 55
End: 2025-05-02
Payer: COMMERCIAL

## 2025-05-08 DIAGNOSIS — I50.41 ACUTE COMBINED SYSTOLIC AND DIASTOLIC CONGESTIVE HEART FAILURE: ICD-10-CM

## 2025-05-08 RX ORDER — SPIRONOLACTONE 25 MG/1
12.5 TABLET ORAL DAILY
Qty: 30 TABLET | Refills: 0 | Status: SHIPPED | OUTPATIENT
Start: 2025-05-08

## 2025-05-12 ENCOUNTER — PHARMACY VISIT (OUTPATIENT)
Dept: PHARMACY | Facility: CLINIC | Age: 55
End: 2025-05-12
Payer: COMMERCIAL

## 2025-05-12 PROCEDURE — RXMED WILLOW AMBULATORY MEDICATION CHARGE

## 2025-05-13 PROBLEM — E11.9 TYPE II DIABETES MELLITUS (MULTI): Status: ACTIVE | Noted: 2025-05-13

## 2025-05-13 PROBLEM — N92.0 MENORRHAGIA: Status: RESOLVED | Noted: 2023-04-20 | Resolved: 2025-05-13

## 2025-05-14 ENCOUNTER — APPOINTMENT (OUTPATIENT)
Dept: BEHAVIORAL HEALTH | Facility: CLINIC | Age: 55
End: 2025-05-14
Payer: COMMERCIAL

## 2025-05-29 ENCOUNTER — APPOINTMENT (OUTPATIENT)
Dept: PRIMARY CARE | Facility: CLINIC | Age: 55
End: 2025-05-29
Payer: COMMERCIAL

## 2025-05-29 VITALS
HEART RATE: 100 BPM | OXYGEN SATURATION: 100 % | BODY MASS INDEX: 22.08 KG/M2 | SYSTOLIC BLOOD PRESSURE: 103 MMHG | DIASTOLIC BLOOD PRESSURE: 70 MMHG | WEIGHT: 120 LBS | HEIGHT: 62 IN

## 2025-05-29 DIAGNOSIS — G43.809 OTHER MIGRAINE WITHOUT STATUS MIGRAINOSUS, NOT INTRACTABLE: ICD-10-CM

## 2025-05-29 DIAGNOSIS — Z91.030 BEE ALLERGY STATUS: ICD-10-CM

## 2025-05-29 DIAGNOSIS — M51.27 OTHER INTERVERTEBRAL DISC DISPLACEMENT, LUMBOSACRAL REGION: ICD-10-CM

## 2025-05-29 DIAGNOSIS — M47.812 CERVICAL SPONDYLOSIS WITHOUT MYELOPATHY: Primary | ICD-10-CM

## 2025-05-29 DIAGNOSIS — I50.41 ACUTE COMBINED SYSTOLIC AND DIASTOLIC CONGESTIVE HEART FAILURE: ICD-10-CM

## 2025-05-29 DIAGNOSIS — Z91.018 NUT ALLERGY: ICD-10-CM

## 2025-05-29 DIAGNOSIS — R55 SYNCOPE AND COLLAPSE: ICD-10-CM

## 2025-05-29 PROCEDURE — 3078F DIAST BP <80 MM HG: CPT | Performed by: STUDENT IN AN ORGANIZED HEALTH CARE EDUCATION/TRAINING PROGRAM

## 2025-05-29 PROCEDURE — 99215 OFFICE O/P EST HI 40 MIN: CPT | Performed by: STUDENT IN AN ORGANIZED HEALTH CARE EDUCATION/TRAINING PROGRAM

## 2025-05-29 PROCEDURE — 3074F SYST BP LT 130 MM HG: CPT | Performed by: STUDENT IN AN ORGANIZED HEALTH CARE EDUCATION/TRAINING PROGRAM

## 2025-05-29 PROCEDURE — 3008F BODY MASS INDEX DOCD: CPT | Performed by: STUDENT IN AN ORGANIZED HEALTH CARE EDUCATION/TRAINING PROGRAM

## 2025-05-29 RX ORDER — SPIRONOLACTONE 100 MG/1
100 TABLET, FILM COATED ORAL 2 TIMES DAILY
Qty: 180 TABLET | Refills: 0 | Status: SHIPPED | OUTPATIENT
Start: 2025-05-29 | End: 2025-08-28

## 2025-05-29 RX ORDER — TOPIRAMATE 100 MG/1
300 CAPSULE, EXTENDED RELEASE ORAL DAILY
Qty: 270 CAPSULE | Refills: 1 | Status: SHIPPED | OUTPATIENT
Start: 2025-05-29 | End: 2026-05-29

## 2025-05-29 RX ORDER — CLINDAMYCIN PHOSPHATE 11.9 MG/ML
SOLUTION TOPICAL 2 TIMES DAILY
Qty: 60 ML | Refills: 2 | Status: SHIPPED | OUTPATIENT
Start: 2025-05-29 | End: 2026-05-29

## 2025-05-29 RX ORDER — TOPIRAMATE 50 MG/1
CAPSULE, EXTENDED RELEASE ORAL
Qty: 90 CAPSULE | Refills: 1 | Status: SHIPPED | OUTPATIENT
Start: 2025-05-29 | End: 2026-05-29

## 2025-05-29 RX ORDER — EPINEPHRINE 2 MG/100UL
1 SPRAY NASAL
Qty: 2 EACH | Refills: 2 | Status: SHIPPED | OUTPATIENT
Start: 2025-05-29 | End: 2025-05-31

## 2025-05-29 ASSESSMENT — PATIENT HEALTH QUESTIONNAIRE - PHQ9
2. FEELING DOWN, DEPRESSED OR HOPELESS: NOT AT ALL
1. LITTLE INTEREST OR PLEASURE IN DOING THINGS: NOT AT ALL
SUM OF ALL RESPONSES TO PHQ9 QUESTIONS 1 AND 2: 0

## 2025-05-29 NOTE — PROGRESS NOTES
"  Subjective   Patient ID:   Azucena Hagan is a  54 y.o. female who presents for Follow-up (Patient is here today for a handicap renewal and medication renewals).     HPI   Continues to get achy with joints and achiness with weather swinging    Her father had passed away     DM  Doing much better     Menopause- now 1 year without menstrual   Spirolactone rx was discontinued at 12.5 from 400mg daily     Migraines   Doing better with trokendi XR     Claritin and nasocort for allergies     Nasal epi    POTS symptoms- compression socks are helping       Current Medications[1]    Visit Vitals  /70   Pulse 100   Ht 1.575 m (5' 2\")   Wt 54.4 kg (120 lb)   SpO2 100%   BMI 21.95 kg/m²   Smoking Status Never   BSA 1.54 m²       Objective   Physical Exam  Vitals reviewed.   Constitutional:       Appearance: Normal appearance.   Cardiovascular:      Rate and Rhythm: Normal rate and regular rhythm.      Heart sounds: No murmur heard.  Pulmonary:      Effort: Pulmonary effort is normal. No respiratory distress.      Breath sounds: Normal breath sounds. No wheezing.   Musculoskeletal:      Cervical back: Neck supple.      Left lower leg: No edema.   Neurological:      Mental Status: She is alert.           Assessment/Plan   Diagnoses and all orders for this visit:  Cervical spondylosis without myelopathy  -     Cane  -     Disability Placard  -     clindamycin (Cleocin T) 1 % external solution; Apply topically 2 times a day.  Syncope and collapse  -     Cane  -     Disability Placard  Other intervertebral disc displacement, lumbosacral region  -     Cane  -     Disability Placard  Acute combined systolic and diastolic congestive heart failure  -     spironolactone (Aldactone) 100 mg tablet; Take 1 tablet (100 mg) by mouth 2 times a day for 180 doses.  Other migraine without status migrainosus, not intractable  -     Trokendi  mg capsule,extended release 24hr; Take 300 mg ( 3 capsules) by mouth once daily. In " addition to the  50mg cap for total of 350mg daily  -     Trokendi XR 50 mg capsule,extended release 24hr; TAKE 1 CAPSULE(50MG) BY MOUTH ONCE DAILY WITH 300MG TROKENDI FOR TOTAL DAILY DOSE OF 350MG  Bee allergy status  -     EPINEPHrine (neffy) 2 mg/spray (0.1 mL) spray,non-aerosol; Administer 1 Dose into affected nostril(s) 1 time if needed (nut allergy) for up to 1 dose.  Nut allergy  -     EPINEPHrine (neffy) 2 mg/spray (0.1 mL) spray,non-aerosol; Administer 1 Dose into affected nostril(s) 1 time if needed (nut allergy) for up to 1 dose.       PMHx: HTN, DSL (statin causes cramping), DJD (numbness of the right leg, was following with Dr. Stokes last visit 10/22, previous steroid injections), DM (A1C 9.5%), Lost significant about of weight using keto (>100) and recent illness, Hypothyroid ( TSH 4.7), Long COVID- SOB (improving over time), PCOS (managing with OCPs),      Ambulatory Dysfunction   Able to ambulate up her 2 steps in the new house  Temperature swings are making   Disability plaque given      DM  A1C improved 6.1%  Following with pharm  Currently not taking her insulin due to previous   Albumin/Cr Ratio:  ACE/ARB:  Statin: allergy/intoleratace        Anxiety/ Hx of Panic attacks    Her psychiatrist passed away, she had been with him for the past 20 years  Did a bridge appointment with Dr. Sorenson  Father passed away   Currently without psychiatry   Moderately increase anxiety/grief  - Xanax 1 mg TID PRN anxiety  - Temazepam 30 mg nightly     HTN  103/70 mmHg   Physical exam was stable. Discussed maintaining diets such as DASH to help maintain normal Blood pressure. Encouraged regular exercise for a total of 120 min weekly. We will fu labs in 1-3 days. For now there will be no change in medical management. All questions and concerns were addressed. Pt will follow up in 4-6 months or sooner if needed.        Lumbar radiculopathy  MRI-disc bulging L4/S1 with moderate to severe diffuse arthritis  Following  "with pain management  Referral to Occupational Therapy and physical therapy      Migraines  Refalee Koch   Will obtain records for previous medications and efficacy for prior auth    Acne  Clindamycin given  Mammogram Ordered  Bee/nut allergy-epi nasal given  Kaci Saha DO 03/12/25 2:02 PM        [1]   Current Outpatient Medications:     ALPRAZolam (Xanax) 1 mg tablet, Take 1 tablet (1 mg) by mouth 3 times a day as needed for anxiety., Disp: 90 tablet, Rfl: 2    BD Ultra-Fine Patience Pen Needle 32 gauge x 5/32\" needle, 4 times a day., Disp: , Rfl:     cyclobenzaprine (Flexeril) 10 mg tablet, Take 1 tablet (10 mg) by mouth 3 times a day as needed for muscle spasms., Disp: 30 tablet, Rfl: 2    EPINEPHrine (EpiPen 2-Td) 0.3 mg/0.3 mL injection syringe, Inject 0.3 mL (0.3 mg) into the muscle 1 time if needed for anaphylaxis for up to 2 doses., Disp: 2 each, Rfl: 1    famotidine (Pepcid) 10 mg tablet, Take by mouth 2 times a day as needed., Disp: , Rfl:     flash glucose scanning reader (FreeStyle Alvarado 2 [place]) misc, Use as instructed. Scan sensor for glucose readings as directed, Disp: 1 each, Rfl: 0    FreeStyle Alvarado 2 [place] misc, Use as instructed, Disp: 1 each, Rfl: 0    FreeStyle Alvarado 2 Sensor kit, For continuous glucose monitoring.  Change every 14 days., Disp: 6 each, Rfl: 3    insulin glargine-yfgn 100 unit/mL (3 mL) Pen, Inject 20 Units under the skin once daily at bedtime., Disp: 90 mL, Rfl: 0    insulin lispro (HumaLOG) 100 unit/mL injection, Inject 0-0.15 mL (0-15 Units) under the skin 3 times daily (morning, midday, late afternoon). 0 units if glucose is less than 180, 4 units if glucose is 181-200, 6 units if glucose is 201-250, 8 units if glucose is 251-300, 10 units if glucose is 301-350, 12 units if glucose is 351-400, Disp: 15 mL, Rfl: 0    lansoprazole (Prevacid) 30 mg DR capsule, TAKE 1 CAPSULE (30 MG) BY MOUTH 2 TIMES A DAY., Disp: 180 capsule, Rfl: 1    semaglutide (OZEMPIC) 1 mg/dose (4 " mg/3 mL) pen injector, Inject 1 mg under the skin 1 (one) time per week., Disp: 3 mL, Rfl: 2    spironolactone (Aldactone) 25 mg tablet, Take 0.5 tablets (12.5 mg) by mouth once daily., Disp: 30 tablet, Rfl: 0    Synthroid 100 mcg tablet, Take 1 tablet (100 mcg) by mouth once daily in the morning. Take before meals., Disp: 90 tablet, Rfl: 3    temazepam (Restoril) 30 mg capsule, Take 1 capsule (30 mg) by mouth as needed at bedtime (insomnia)., Disp: 30 capsule, Rfl: 2    Trokendi  mg capsule,extended release 24hr, Take 300 mg ( 3 capsules) by mouth once daily. In addition to the  50mg cap for total of 350mg daily, Disp: 270 capsule, Rfl: 1    Trokendi XR 50 mg capsule,extended release 24hr, TAKE 1 CAPSULE(50MG) BY MOUTH ONCE DAILY WITH 300MG TROKENDI FOR TOTAL DAILY DOSE OF 350MG, Disp: 90 capsule, Rfl: 1

## 2025-05-30 PROCEDURE — RXMED WILLOW AMBULATORY MEDICATION CHARGE

## 2025-06-02 PROCEDURE — RXMED WILLOW AMBULATORY MEDICATION CHARGE

## 2025-06-03 ENCOUNTER — APPOINTMENT (OUTPATIENT)
Dept: PHARMACY | Facility: HOSPITAL | Age: 55
End: 2025-06-03
Payer: COMMERCIAL

## 2025-06-03 DIAGNOSIS — Z79.4 TYPE 2 DIABETES MELLITUS WITH OTHER SPECIFIED COMPLICATION, WITH LONG-TERM CURRENT USE OF INSULIN: Primary | ICD-10-CM

## 2025-06-03 DIAGNOSIS — E11.69 TYPE 2 DIABETES MELLITUS WITH OTHER SPECIFIED COMPLICATION, WITH LONG-TERM CURRENT USE OF INSULIN: Primary | ICD-10-CM

## 2025-06-03 NOTE — PROGRESS NOTES
[facility] ([facility])    Azucena Hagan is a 54 y.o. female presenting to Clinical Pharmacy Team for follow up as part of the [employer] ([facility]) diabetes program. Referring Provider: Kaci Saha, DO    Does patient follow with Endocrinology: Yes  Endocrinology Provider Name:  Ramesh Diaz MD  *Has been following PCP for Ozempic*    Subjective   Allergies   Allergen Reactions    Pecan Nut Shortness of breath    Cephalexin Other    House Dust Unknown    Hydrocodone-Acetaminophen Hallucinations and Other     Hallucinations    Insulin Aspart Itching    Insulin Regular Unknown    Nickel Other     Nickel-skin irritation    Red Dye Unknown    Statins-Hmg-Coa Reductase Inhibitors Other     Muscle cramping      Tree And Shrub Pollen Swelling    Venom-Wasp Other     Diabetes  She presents for her follow-up diabetic visit. She has type 2 diabetes mellitus. Her disease course has been improving. Risk factors for coronary artery disease include diabetes mellitus, dyslipidemia and hypertension. She is currently taking insulin at bedtime. An ACE inhibitor/angiotensin II receptor blocker is not being taken. She does not see a podiatrist.Eye exam is not current.     Pertinent PMH Review:  PMH of Pancreatitis: No  PMH of Retinopathy: No  PMH of Urinary Tract Infections: Yes  PMH of MTC: No ([place] thyroiditis)    Lifestyle:  Reports increased stress lately (multifactorial)  Weight:  This visit: reports stable, initially gained weight w/ stress eating but reports has decreased w/ increased Ozempic  At last visit: Not monitoring weight at home to avoid fixating on it    HOME MONITORING  Monitoring Device: CGM, Freestyle Alvarado 2  Monitoring Frequency: continuous     Current home BG readings:  BG log not present at today's visit, denies hypo/hyperglycemic s/sx    Previous readings:    Reports CGM data have stabilized, overall TIR > 70%.   Highest witnessed: 150 mg/dL  "after stress eating    Any episodes of hypoglycemia? No    Objective     BP Readings from Last 6 Encounters:   05/29/25 103/70   05/24/24 137/70   05/18/24 (!) 138/111   02/14/24 118/58   11/22/23 114/76   11/21/23 110/75      Daily Weight  05/29/25 : 54.4 kg (120 lb)  05/18/24 : 54 kg (119 lb)  05/18/24 : 51.7 kg (114 lb)  02/14/24 : 51.7 kg (114 lb)  01/31/24 : 50.8 kg (112 lb)  12/12/23 : 50.8 kg (112 lb)     LAB  Lab Results   Component Value Date    BILITOT 0.3 04/18/2025    CALCIUM 9.1 04/18/2025    CO2 14 (L) 04/18/2025     04/18/2025    CREATININE 1.21 (H) 04/18/2025    GLUCOSE 88 04/18/2025    ALKPHOS 65 04/18/2025    K 3.9 04/18/2025    PROT 7.1 04/18/2025     (L) 04/18/2025    AST 13 04/18/2025    ALT 9 04/18/2025    BUN 18 04/18/2025    ANIONGAP 17 04/18/2025    MG 1.71 05/22/2024    PHOS 1.3 (L) 06/17/2023    PHOS CANCELED 06/17/2023    ALBUMIN 4.2 04/18/2025    AMYLASE 13 (L) 05/19/2024    LIPASE 19 05/19/2024    GFRF >90 06/21/2023    GFRMALE CANCELED 06/19/2023     Lab Results   Component Value Date    TRIG 101 05/19/2024    CHOL 169 05/19/2024    LDLCALC 102 (H) 05/19/2024    HDL 46.7 05/19/2024     Lab Results   Component Value Date    HGBA1C 6.1 (H) 04/18/2025     Estimated body mass index is 21.95 kg/m² as calculated from the following:    Height as of 5/29/25: 1.575 m (5' 2\").    Weight as of 5/29/25: 54.4 kg (120 lb).     Current Outpatient Medications on File Prior to Visit   Medication Sig Dispense Refill    ALPRAZolam (Xanax) 1 mg tablet Take 1 tablet (1 mg) by mouth 3 times a day as needed for anxiety. 90 tablet 2    clindamycin (Cleocin T) 1 % external solution Apply topically 2 times a day. 60 mL 2    cyclobenzaprine (Flexeril) 10 mg tablet Take 1 tablet (10 mg) by mouth 3 times a day as needed for muscle spasms. 30 tablet 2    EPINEPHrine (neffy) 2 mg/spray (0.1 mL) spray,non-aerosol Administer 1 Dose into affected nostril(s) 1 time if needed (nut allergy) for up to 1 dose. 2 " "each 2    famotidine (Pepcid) 10 mg tablet Take by mouth 2 times a day as needed.      flash glucose scanning reader (FreeStyle Alvarado 2 [place]) misc Use as instructed. Scan sensor for glucose readings as directed 1 each 0    FreeStyle Alvarado 2 [place] misc Use as instructed 1 each 0    FreeStyle Alvarado 2 Sensor kit For continuous glucose monitoring.  Change every 14 days. 6 each 3    lansoprazole (Prevacid) 30 mg DR capsule TAKE 1 CAPSULE (30 MG) BY MOUTH 2 TIMES A DAY. 180 capsule 1    spironolactone (Aldactone) 100 mg tablet Take 1 tablet (100 mg) by mouth 2 times a day for 180 doses. 180 tablet 0    Synthroid 100 mcg tablet Take 1 tablet (100 mcg) by mouth once daily in the morning. Take before meals. 90 tablet 3    temazepam (Restoril) 30 mg capsule Take 1 capsule (30 mg) by mouth as needed at bedtime (insomnia). 30 capsule 2    Trokendi  mg capsule,extended release 24hr Take 300 mg ( 3 capsules) by mouth once daily. In addition to the  50mg cap for total of 350mg daily 270 capsule 1    Trokendi XR 50 mg capsule,extended release 24hr TAKE 1 CAPSULE(50MG) BY MOUTH ONCE DAILY WITH 300MG TROKENDI FOR TOTAL DAILY DOSE OF 350MG 90 capsule 1    [DISCONTINUED] BD Ultra-Fine Patience Pen Needle 32 gauge x 5/32\" needle 4 times a day.      [DISCONTINUED] EPINEPHrine (EpiPen 2-Td) 0.3 mg/0.3 mL injection syringe Inject 0.3 mL (0.3 mg) into the muscle 1 time if needed for anaphylaxis for up to 2 doses. 2 each 1    [DISCONTINUED] insulin glargine-yfgn 100 unit/mL (3 mL) Pen Inject 20 Units under the skin once daily at bedtime. 90 mL 0    [DISCONTINUED] insulin lispro (HumaLOG) 100 unit/mL injection Inject 0-0.15 mL (0-15 Units) under the skin 3 times daily (morning, midday, late afternoon). 0 units if glucose is less than 180, 4 units if glucose is 181-200, 6 units if glucose is 201-250, 8 units if glucose is 251-300, 10 units if glucose is 301-350, 12 units if glucose is 351-400 15 mL 0    [DISCONTINUED] semaglutide " (OZEMPIC) 1 mg/dose (4 mg/3 mL) pen injector Inject 1 mg under the skin 1 (one) time per week. 3 mL 2    [DISCONTINUED] spironolactone (Aldactone) 25 mg tablet Take 0.5 tablets (12.5 mg) by mouth once daily. 30 tablet 0    [DISCONTINUED] Trokendi  mg capsule,extended release 24hr Take 300 mg ( 3 capsules) by mouth once daily. In addition to the  50mg cap for total of 350mg daily 270 capsule 1    [DISCONTINUED] Trokendi XR 50 mg capsule,extended release 24hr TAKE 1 CAPSULE(50MG) BY MOUTH ONCE DAILY WITH 300MG TROKENDI FOR TOTAL DAILY DOSE OF 350MG 90 capsule 1     No current facility-administered medications on file prior to visit.      Drug Interactions:  None warranting change in therapy at this time    CURRENT DIABETES PHARMACOTHERAPY  Ozempic 0.75 mg injected subcutaneously once weekly (counting clicks)  Alternating between 0.5 mg and 0.75 mg as needed depending on how she's feeling    HISTORICAL PHARMACOTHERAPY (if relevant)  Insulin glargine-yfgn - discontinued d/t   Insulin aspart - adverse reactions documented  Insulin regular - adverse reactions documented  Insulin lispro - prescribed at hospital discharge 5/2024, not renewed    PREVENTATIVE PHARMACOTHERAPY  Statin? no  LDL: not at goal (< 70 mg/dL)  ACE-I/ARB? no  BP: not at goal, < 130/80  UACR: unable to assess  Aspirin?  no    Assessment/Plan   Problem List Items Addressed This Visit       Type II diabetes mellitus (Multi) - Primary    Relevant Medications    semaglutide (OZEMPIC) 1 mg/dose (4 mg/3 mL) pen injector    Other Relevant Orders    Referral to Clinical Pharmacy     Diabetes:  Patient's Type 2 diabetes is greatly improved and now at goal to w/ A1c = 6.1% (goal < 7%).    Continues to mostly tolerate Ozempic, but every few weeks has increased side effects, unable to correlate with any particular triggers. Will continue trying to titrate slowly as tolerated.  SMBGs reported to be improved, reports TIR > 70%, exact values unknown, but reports  less variable BG throughout the day, denies hypoglycemia.   Continue:  Ozempic 0.75 mg injected subcutaneously once weekly as directed, as tolerated    Follow up: 3 months      Vesta Bourgeois, PharmD     Continue all meds under the continuation of care with the referring provider and clinical pharmacy team. Patient/Caregiver was informed they may decline to participate or withdraw from participation in pharmacy services at any time.

## 2025-06-04 ENCOUNTER — APPOINTMENT (OUTPATIENT)
Dept: BEHAVIORAL HEALTH | Facility: CLINIC | Age: 55
End: 2025-06-04
Payer: COMMERCIAL

## 2025-06-04 DIAGNOSIS — F41.0 GENERALIZED ANXIETY DISORDER WITH PANIC ATTACKS: ICD-10-CM

## 2025-06-04 DIAGNOSIS — F41.1 GENERALIZED ANXIETY DISORDER WITH PANIC ATTACKS: ICD-10-CM

## 2025-06-04 DIAGNOSIS — F90.9 ATTENTION DEFICIT HYPERACTIVITY DISORDER (ADHD), UNSPECIFIED ADHD TYPE: ICD-10-CM

## 2025-06-04 PROCEDURE — 99213 OFFICE O/P EST LOW 20 MIN: CPT | Performed by: STUDENT IN AN ORGANIZED HEALTH CARE EDUCATION/TRAINING PROGRAM

## 2025-06-04 RX ORDER — ALPRAZOLAM 1 MG/1
1 TABLET ORAL 3 TIMES DAILY PRN
Qty: 90 TABLET | Refills: 2 | Status: SHIPPED | OUTPATIENT
Start: 2025-06-04 | End: 2025-09-02

## 2025-06-04 RX ORDER — TEMAZEPAM 30 MG/1
30 CAPSULE ORAL NIGHTLY PRN
Qty: 30 CAPSULE | Refills: 2 | Status: SHIPPED | OUTPATIENT
Start: 2025-06-04 | End: 2025-09-02
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
## 2025-06-05 ENCOUNTER — PHARMACY VISIT (OUTPATIENT)
Dept: PHARMACY | Facility: CLINIC | Age: 55
End: 2025-06-05
Payer: COMMERCIAL

## 2025-06-05 NOTE — ASSESSMENT & PLAN NOTE
Mostly related to health and susceptibility to illness since COVID. Most wants to reduce agoraphobia but feels like she's making progress as the weather gets warmer. Past trials include: Paxil (insomnia), Zoloft (apathetic), Ambien (parasomnias), Lexapro, Prozac, Effexor, Cymbalta, amitriptyline, valium up to 20mg (ineffective).  - agree with plan for equine therapy  - continue Xanax 1 mg TID PRN anxiety  - continue Temazepam 30 mg nightly

## 2025-06-05 NOTE — PATIENT INSTRUCTIONS
Please send me a message in [place] if things aren't going as expected or you are unsure about something we discussed. If this isn't working, you can call the psychiatry department line at [phone], or leave me a voicemail at [phone].  If you are having thoughts of harming yourself or ending your life, please call the national suicide hotline 24/7 at 763. For this and other mental health emergencies, such as losing touch with reality, call the [facility] 24/7 mobile crisis hotline at 027-775-6564, or dial 911 for emergency services.

## 2025-06-05 NOTE — ASSESSMENT & PLAN NOTE
Medications either contra-indicated or problematic. Currently uncontrolled and may be contributing to feeling overwhelmed, perhaps adding to fatigue.  - no changes for now pending therapy/workup of other causes

## 2025-06-09 PROCEDURE — RXMED WILLOW AMBULATORY MEDICATION CHARGE

## 2025-06-10 ENCOUNTER — PHARMACY VISIT (OUTPATIENT)
Dept: PHARMACY | Facility: CLINIC | Age: 55
End: 2025-06-10
Payer: COMMERCIAL

## 2025-06-18 PROCEDURE — RXMED WILLOW AMBULATORY MEDICATION CHARGE

## 2025-06-20 ENCOUNTER — PHARMACY VISIT (OUTPATIENT)
Dept: PHARMACY | Facility: CLINIC | Age: 55
End: 2025-06-20
Payer: COMMERCIAL

## 2025-07-08 ENCOUNTER — PHARMACY VISIT (OUTPATIENT)
Dept: PHARMACY | Facility: CLINIC | Age: 55
End: 2025-07-08
Payer: COMMERCIAL

## 2025-07-08 PROCEDURE — RXMED WILLOW AMBULATORY MEDICATION CHARGE

## 2025-07-17 ENCOUNTER — OFFICE VISIT (OUTPATIENT)
Dept: CARDIOLOGY | Facility: CLINIC | Age: 55
End: 2025-07-17
Payer: COMMERCIAL

## 2025-07-17 VITALS
SYSTOLIC BLOOD PRESSURE: 103 MMHG | BODY MASS INDEX: 21.95 KG/M2 | OXYGEN SATURATION: 97 % | DIASTOLIC BLOOD PRESSURE: 71 MMHG | HEART RATE: 64 BPM | WEIGHT: 120 LBS

## 2025-07-17 DIAGNOSIS — I42.9 CARDIOMYOPATHY, UNSPECIFIED TYPE (MULTI): Primary | ICD-10-CM

## 2025-07-17 PROCEDURE — 93010 ELECTROCARDIOGRAM REPORT: CPT | Performed by: INTERNAL MEDICINE

## 2025-07-17 PROCEDURE — 93005 ELECTROCARDIOGRAM TRACING: CPT | Performed by: INTERNAL MEDICINE

## 2025-07-17 PROCEDURE — 99214 OFFICE O/P EST MOD 30 MIN: CPT | Performed by: INTERNAL MEDICINE

## 2025-07-17 PROCEDURE — 3078F DIAST BP <80 MM HG: CPT | Performed by: INTERNAL MEDICINE

## 2025-07-17 PROCEDURE — 1036F TOBACCO NON-USER: CPT | Performed by: INTERNAL MEDICINE

## 2025-07-17 PROCEDURE — 99212 OFFICE O/P EST SF 10 MIN: CPT | Mod: 25

## 2025-07-17 PROCEDURE — 3074F SYST BP LT 130 MM HG: CPT | Performed by: INTERNAL MEDICINE

## 2025-07-17 ASSESSMENT — ENCOUNTER SYMPTOMS
MYALGIAS: 0
OCCASIONAL FEELINGS OF UNSTEADINESS: 0
VOMITING: 0
CONSTIPATION: 0
HEMOPTYSIS: 0
FEVER: 0
FALLS: 0
HEADACHES: 0
WHEEZING: 0
HEMATURIA: 0
COUGH: 0
DEPRESSION: 0
CHILLS: 0
ALTERED MENTAL STATUS: 0
ABDOMINAL PAIN: 0
BLOATING: 0
DYSURIA: 0
DIARRHEA: 0
MEMORY LOSS: 0
NAUSEA: 0
LOSS OF SENSATION IN FEET: 0

## 2025-07-17 NOTE — PROGRESS NOTES
Chief Complaint   Patient presents with    Cardiomyopathy       HPI  53 yo WF w/ h/o myopericarditis vs [place] 5/24, DM, hypothyroid, long COVID, anxiety, POTS now here for cardiology fu.   No chest pain. No dyspnea at rest. No sig ANDERSON. No orthopnea/PND. +occ mild palps x few minutes (prev improved on Bystolic). +occ LH on standing up. +occ min LE edema. No claudication. No cough.   11/23 hosp for N/V followed by syncope felt to be due to dehydration (K 2.7,, HSTPN 232). No further episodes.  5/24 hosp for N/V/CP -> dx'ed with myopericarditis. No further N/V/CP.  BP at home: 100s/70s HR 90s  ECG 6/23: ST (122)  ECG 11/23: ST (123), nonsp ST-T changes  ECG 11/23: ST (119), low volt  ECG 5/24: ), LV, ant TWIs  ECG 5/24: ST (109), LV, nonsp T-wave changes  ECG 7/25: ST (104), low volt  Echo 5/24: EF 30%  Echo 5/24: EF 45-50%  Cath 5/24: mLAD 30%, LVEDP 11, no AVS  CXR 11/23: no acute abnl  CXR 5/24: no acute abnl  CTA chest 5/24: no PE, nl PA, nl Ao, nl heart size, no peric eff  CT ab 6/23: nl heart size, no peric eff  CT ab 11/23: no AAA  CT ab 5/24: Ao unremark  CT brain 5/24: no acute abnl    Review of Systems   Constitutional: Negative for chills, fever and malaise/fatigue.   HENT:  Negative for hearing loss.    Eyes:  Negative for visual disturbance.   Respiratory:  Negative for cough, hemoptysis and wheezing.    Skin:  Negative for rash.   Musculoskeletal:  Negative for falls and myalgias.   Gastrointestinal:  Negative for bloating, abdominal pain, constipation, diarrhea, dysphagia, nausea and vomiting.   Genitourinary:  Negative for dysuria and hematuria.   Neurological:  Negative for headaches.   Psychiatric/Behavioral:  Negative for altered mental status, depression and memory loss.       Social History     Tobacco Use    Smoking status: Never     Passive exposure: Never    Smokeless tobacco: Never   Substance Use Topics    Alcohol use: Not Currently     Comment: Zero drinking since before 2020.     "  Family History   Problem Relation Name Age of Onset    Anxiety disorder Mother Karolyn Terrell     Other (back injury) Mother Karolyn Terrell     Depression Mother Karolyn Terrell         recurrent    Other (cardiac disorder) Father      Heart attack Father      Anxiety disorder Brother Wild     Hypertension Brother Wild     Other (king disease) Brother Wild     Anxiety disorder Father Kiko Terrell     Depression Brother Wild     Anxiety disorder Maternal Grandmother Bisi     Dementia Maternal Grandmother Bisi     Arthritis Mother Karolyn Terrell     Diabetes Father Kiko Terrell     Arthritis Father Kiko Terrell     Hearing loss Father Kiko Terrell     Mental illness Father Kiko Terrell     Migraines Brother Wild     Fibromyalgia Mother's Sister Pat     Fibromyalgia Mother's Sister Cousin \"Ananda “     Tics Mother's Sister Cousin \"Ananda “     Tourette syndrome Mother's Sister Cousin \"Ananda “       Allergies   Allergen Reactions    Pecan Nut Shortness of breath    Cephalexin Other    House Dust Unknown    Hydrocodone-Acetaminophen Hallucinations and Other     Hallucinations    Insulin Aspart Itching    Insulin Regular Unknown    Nickel Other     Nickel-skin irritation    Red Dye Unknown    Statins-Hmg-Coa Reductase Inhibitors Other     Muscle cramping      Tree And Shrub Pollen Swelling    Venom-Wasp Other       Physical Exam  Constitutional:       Appearance: Normal appearance.   HENT:      Head: Normocephalic and atraumatic.      Nose: Nose normal.   Neck:      Vascular: No carotid bruit.     Cardiovascular:      Rate and Rhythm: Normal rate and regular rhythm.      Heart sounds: No murmur heard.  Pulmonary:      Effort: Pulmonary effort is normal.      Breath sounds: Normal breath sounds.   Abdominal:      Palpations: Abdomen is soft.      Tenderness: There is no abdominal tenderness.     Musculoskeletal:      Right lower leg: No edema.      Left lower leg: No edema.     Skin:     General: Skin " is warm and dry.     Neurological:      General: No focal deficit present.      Mental Status: She is alert.     Psychiatric:         Mood and Affect: Mood normal.         Judgment: Judgment normal.        Labs  4/25 Cr 1.21, K 3.9, HGB 10.8, , hgba1c 6.1, TSH 4.26  5/24 Cr 0.68, K 3.3, Mg 1.71, LFT nl, HGB 11.1, , Lex 51, ESR 38, CRP 9.7 -> 3.8, HSTPN 1170,     Assessment/Plan   55 yo WF w/ h/o myopericarditis vs [place] 5/24, DM, hypothyroid, long COVID, anxiety, POTS. Doing fair. 5/24 EF improved from 30 to 45-50%. Will now get updated echo for EF.  If remains low, consider cMRI (she claims cannot do unless sedated). FU PRN (pending echo)    Bertrand Griffin MD

## 2025-07-18 LAB
ATRIAL RATE: 104 BPM
P AXIS: 71 DEGREES
P OFFSET: 205 MS
P ONSET: 152 MS
PR INTERVAL: 144 MS
Q ONSET: 224 MS
QRS COUNT: 17 BEATS
QRS DURATION: 74 MS
QT INTERVAL: 350 MS
QTC CALCULATION(BAZETT): 460 MS
QTC FREDERICIA: 420 MS
R AXIS: 19 DEGREES
T AXIS: 67 DEGREES
T OFFSET: 399 MS
VENTRICULAR RATE: 104 BPM

## 2025-07-31 DIAGNOSIS — E11.65 POORLY CONTROLLED TYPE 2 DIABETES MELLITUS (MULTI): ICD-10-CM

## 2025-07-31 PROCEDURE — RXMED WILLOW AMBULATORY MEDICATION CHARGE

## 2025-08-01 RX ORDER — FLASH GLUCOSE SCANNING READER
EACH MISCELLANEOUS
Qty: 1 EACH | Refills: 0 | OUTPATIENT
Start: 2025-08-01

## 2025-08-05 ENCOUNTER — PHARMACY VISIT (OUTPATIENT)
Dept: PHARMACY | Facility: CLINIC | Age: 55
End: 2025-08-05
Payer: COMMERCIAL

## 2025-08-05 DIAGNOSIS — E11.65 POORLY CONTROLLED TYPE 2 DIABETES MELLITUS (MULTI): ICD-10-CM

## 2025-08-05 PROCEDURE — RXMED WILLOW AMBULATORY MEDICATION CHARGE

## 2025-08-06 ENCOUNTER — PHARMACY VISIT (OUTPATIENT)
Dept: PHARMACY | Facility: CLINIC | Age: 55
End: 2025-08-06
Payer: COMMERCIAL

## 2025-08-06 DIAGNOSIS — Z79.4 TYPE 2 DIABETES MELLITUS WITH OTHER SPECIFIED COMPLICATION, WITH LONG-TERM CURRENT USE OF INSULIN: Primary | ICD-10-CM

## 2025-08-06 DIAGNOSIS — E11.69 TYPE 2 DIABETES MELLITUS WITH OTHER SPECIFIED COMPLICATION, WITH LONG-TERM CURRENT USE OF INSULIN: Primary | ICD-10-CM

## 2025-08-06 DIAGNOSIS — Z12.31 ENCOUNTER FOR SCREENING MAMMOGRAM FOR BREAST CANCER: ICD-10-CM

## 2025-08-06 PROCEDURE — RXMED WILLOW AMBULATORY MEDICATION CHARGE

## 2025-08-06 RX ORDER — FLASH GLUCOSE SCANNING READER
EACH MISCELLANEOUS
Qty: 1 EACH | Refills: 0 | OUTPATIENT
Start: 2025-08-06

## 2025-08-06 RX ORDER — BLOOD-GLUCOSE SENSOR
EACH MISCELLANEOUS
Qty: 6 EACH | Refills: 3 | Status: SHIPPED | OUTPATIENT
Start: 2025-08-06

## 2025-08-06 RX ORDER — BLOOD-GLUCOSE SENSOR
EACH MISCELLANEOUS
Qty: 6 EACH | Refills: 3 | Status: SHIPPED | OUTPATIENT
Start: 2025-08-06 | End: 2025-08-06

## 2025-08-06 RX ORDER — FLASH GLUCOSE SCANNING READER
EACH MISCELLANEOUS
Qty: 1 EACH | Refills: 0 | Status: SHIPPED | OUTPATIENT
Start: 2025-08-06

## 2025-08-12 ENCOUNTER — APPOINTMENT (OUTPATIENT)
Dept: CARDIOLOGY | Facility: CLINIC | Age: 55
End: 2025-08-12
Payer: COMMERCIAL

## 2025-09-09 ENCOUNTER — APPOINTMENT (OUTPATIENT)
Dept: BEHAVIORAL HEALTH | Facility: CLINIC | Age: 55
End: 2025-09-09
Payer: COMMERCIAL

## 2025-09-09 ENCOUNTER — APPOINTMENT (OUTPATIENT)
Dept: PHARMACY | Facility: HOSPITAL | Age: 55
End: 2025-09-09
Payer: COMMERCIAL

## (undated) DEVICE — COVER, PROBE, PULL UP ULTRASOUND KIT, 5 X 48

## (undated) DEVICE — MANIFOLD KIT, CUSTOM, GEAUGA

## (undated) DEVICE — ANGIO KIT, LEFT HEART, LF, CUSTOM

## (undated) DEVICE — TR BAND, RADIAL COMPRESSION, STANDARD, 24CM

## (undated) DEVICE — INTRODUCER SHEATH, GLIDESHEATH, 6FR 10CM

## (undated) DEVICE — GUIDEWIRE, INQWIRE, 3MM J, .035 X 210CM, FIXED

## (undated) DEVICE — CATHETER, OPTITORQUE, 5FR, TIG1, 1H/100CM

## (undated) DEVICE — CATHETER, ANGIO, IMPULSE, FL3.5, 5 FR X 100 CM